# Patient Record
Sex: MALE | Race: WHITE | Employment: FULL TIME | URBAN - METROPOLITAN AREA
[De-identification: names, ages, dates, MRNs, and addresses within clinical notes are randomized per-mention and may not be internally consistent; named-entity substitution may affect disease eponyms.]

---

## 2017-02-15 ENCOUNTER — HOSPITAL ENCOUNTER (OUTPATIENT)
Dept: CT IMAGING | Age: 52
Discharge: HOME | End: 2017-02-15
Attending: FAMILY MEDICINE
Payer: COMMERCIAL

## 2017-02-15 ENCOUNTER — HOSPITAL ENCOUNTER (OUTPATIENT)
Dept: ULTRASOUND IMAGING | Age: 52
Discharge: HOME | End: 2017-02-15
Attending: FAMILY MEDICINE
Payer: COMMERCIAL

## 2017-02-15 DIAGNOSIS — R22.1 NECK MASS: ICD-10-CM

## 2017-02-15 DIAGNOSIS — R31.9 HEMATURIA SYNDROME: ICD-10-CM

## 2017-02-15 PROCEDURE — 74011636320 HC RX REV CODE- 636/320: Performed by: RADIOLOGY

## 2017-02-15 PROCEDURE — 74178 CT ABD&PLV WO CNTR FLWD CNTR: CPT

## 2017-02-15 PROCEDURE — 74011250636 HC RX REV CODE- 250/636: Performed by: RADIOLOGY

## 2017-02-15 PROCEDURE — 76536 US EXAM OF HEAD AND NECK: CPT

## 2017-02-15 RX ORDER — SODIUM CHLORIDE 0.9 % (FLUSH) 0.9 %
10 SYRINGE (ML) INJECTION
Status: COMPLETED | OUTPATIENT
Start: 2017-02-15 | End: 2017-02-15

## 2017-02-15 RX ORDER — SODIUM CHLORIDE 9 MG/ML
50 INJECTION, SOLUTION INTRAVENOUS
Status: COMPLETED | OUTPATIENT
Start: 2017-02-15 | End: 2017-02-15

## 2017-02-15 RX ADMIN — Medication 10 ML: at 07:37

## 2017-02-15 RX ADMIN — IOPAMIDOL 100 ML: 612 INJECTION, SOLUTION INTRAVENOUS at 07:37

## 2017-02-15 RX ADMIN — SODIUM CHLORIDE 50 ML/HR: 900 INJECTION, SOLUTION INTRAVENOUS at 07:37

## 2017-03-30 ENCOUNTER — OFFICE VISIT (OUTPATIENT)
Dept: CARDIOLOGY CLINIC | Age: 52
End: 2017-03-30

## 2017-03-30 VITALS
DIASTOLIC BLOOD PRESSURE: 90 MMHG | BODY MASS INDEX: 36.11 KG/M2 | WEIGHT: 243.8 LBS | SYSTOLIC BLOOD PRESSURE: 150 MMHG | HEIGHT: 69 IN | HEART RATE: 79 BPM | RESPIRATION RATE: 16 BRPM | OXYGEN SATURATION: 97 %

## 2017-03-30 DIAGNOSIS — I25.10 CORONARY ARTERY DISEASE INVOLVING NATIVE CORONARY ARTERY OF NATIVE HEART WITHOUT ANGINA PECTORIS: Primary | ICD-10-CM

## 2017-03-30 DIAGNOSIS — I10 ESSENTIAL HYPERTENSION: ICD-10-CM

## 2017-03-30 DIAGNOSIS — Z01.810 PREOP CARDIOVASCULAR EXAM: ICD-10-CM

## 2017-03-30 DIAGNOSIS — Z71.6 ENCOUNTER FOR COUNSELING FOR TOBACCO USE DISORDER: ICD-10-CM

## 2017-03-30 DIAGNOSIS — Z72.0 TOBACCO USE: ICD-10-CM

## 2017-03-30 NOTE — LETTER
3/31/2017 4:39 PM 
 
Patient:  Barbara Vilchis YOB: 1965 Date of Visit: 3/30/2017 Dear Farhan Antoine, MD 
[address] 
[address] [address] VIA Facsimile: [phone] Gissel Quinn III 80  [facility] [place] [address] VIA In Basket 
 : 
Barbara Vilchis is a 45 yo M with CAD s/p cath on 9/30/14 with 80% prox/mid LAD lesion treated with drug eluting stent after abnormal stress test, HTN, tobacco use, family history of early CAD. He is here for post cath f/u. He is here for preop cardiac evaluation prior surgery on his right tonsil followed by Dr. Yajaira Luna and oncology. From a cardiac standpoint, he denies any exertional chest pain or shortness of breath. No significant palpitations. He has been compliant with his medications. 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 He says they did a PET scan and there is no spread. He is scheduled to have lymph node surgery on 04/10/2017 at [facility] and then surgery of the tonsil at [facility] on 04/19/2017. With regard to tobacco use, he has cut this back in half over the last few weeks. Encouraged him for complete cessation. He is compensated on exam with clear lungs and no lower extremity edema. Soc hx. Working on tobacco cessation; down from 1 ppd to 1 pack every 3 days. . 4 kids. Still smoking Assessment and Plan: 1. Coronary artery disease, status post LAD stent in 2014. Stable and compensated. Continue aspirin, statin and ARB. 2. Preop cardiac evaluation. He is low risk for cardiac complications and optimized from a cardiac standpoint. He can hold his aspirin as needed, but stressed the importance of restarting after. 3. Essential hypertension. Blood pressure is controlled and no changes made. 4. Mixed hyperlipidemia. History of intolerance to Lipitor and Zocor with muscle aches, but has tolerated Pravachol. 5. Tobacco use. Stressed the importance of complete cessation. 6. Tonsil cancer. Followed by oncology, Dr. Sidney Wood and we will send preop clearance. 7. Hypothyroid. If you have questions, please do not hesitate to call me. Sincerely, Verito Johnson MD

## 2017-03-30 NOTE — MR AVS SNAPSHOT
Visit Information Date & Time Provider Department Dept. Phone Encounter #  
 3/30/2017 10:00 AM Radha Paul MD [facility] [place] [phone] [id number] Upcoming Health Maintenance Date Due Pneumococcal 19-64 Medium Risk (1 of 1 - PPSV23) 5/16/1984 DTaP/Tdap/Td series (1 - Tdap) 5/16/1986 FOBT Q 1 YEAR AGE 50-75 5/16/2015 INFLUENZA AGE 9 TO ADULT 8/1/2016 Allergies as of 3/30/2017  Review Complete On: 3/30/2017 By: Mark Gutierrez RN No Known Allergies Current Immunizations  Reviewed on 9/30/2014 No immunizations on file. Not reviewed this visit You Were Diagnosed With   
  
 Codes Comments Unstable angina (HCC)    -  Primary ICD-10-CM: I20.0 ICD-9-CM: 411.1 Essential hypertension     ICD-10-CM: I10 
ICD-9-CM: 401.9 Vitals BP Pulse Resp Height(growth percentile) Weight(growth percentile) SpO2  
 150/90 (BP 1 Location: Left arm, BP Patient Position: Sitting) 79 16 5' 9\" (1.753 m) 243 lb 12.8 oz (110.6 kg) 97% BMI Smoking Status 36 kg/m2 Current Every Day Smoker BMI and BSA Data Body Mass Index Body Surface Area  
 36 kg/m 2 2.32 m 2 Preferred Pharmacy Pharmacy Name Phone [facility] [address] [place] [address] Dr Tran, [address] [phone] Your Updated Medication List  
  
   
This list is accurate as of: 3/30/17 10:39 AM.  Always use your most recent med list.  
  
  
  
  
 aspirin delayed-release 81 mg tablet Take  by mouth daily. levothyroxine 100 mcg tablet Commonly known as:  SYNTHROID Take 1 tablet by mouth Daily (before breakfast). losartan 25 mg tablet Commonly known as:  COZAAR Take 1 Tab by mouth daily. pravastatin 10 mg tablet Commonly known as:  PRAVACHOL Take 1 Tab by mouth nightly. We Performed the Following AMB POC EKG ROUTINE W/ 12 LEADS, INTER & REP [85115 CPT(R)] Introducing [facility]! Dear Luis E Ayala: Thank you for requesting a [employer] account. Our records indicate that you already have an active [employer] account. You can access your account anytime at [email or web address] Did you know that you can access your hospital and ER discharge instructions at any time in [employer]? You can also review all of your test results from your hospital stay or ER visit. Additional Information If you have questions, please visit the Frequently Asked Questions section of the [employer] website at [email or web address] Remember, [employer] is NOT to be used for urgent needs. For medical emergencies, dial 911. Now available from your iPhone and Android! Please provide this summary of care documentation to your next provider. Your primary care clinician is listed as Tiana Suárez. If you have any questions after today's visit, please call [phone].

## 2017-03-30 NOTE — PROGRESS NOTES
[address]: Rachel Ayala  [phone]    HPI:  Wilbert Gleason is a 45 yo M with CAD s/p cath on 9/30/14 with 80% prox/mid LAD lesion treated with drug eluting stent after abnormal stress test, HTN, tobacco use, family history of early CAD. He is here for post cath f/u. He is here for preop cardiac evaluation prior surgery on his right tonsil followed by Dr. Johnna Luna and oncology. From a cardiac standpoint, he denies any exertional chest pain or shortness of breath. No significant palpitations. He has been compliant with his medications. 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 He says they did a PET scan and there is no spread. He is scheduled to have lymph node surgery on 04/10/2017 at [facility] and [facility]. With regard to tobacco use, he has cut this back in half over the last few weeks. Encouraged him for complete cessation. He is compensated on exam with clear lungs and no lower extremity edema. Soc hx. Working on tobacco cessation; down from 1 ppd to 1 pack every 3 days. . 4 kids. Still smoking  Assessment and Plan:   1. Coronary artery disease, status post LAD stent in 2014. Stable and compensated. Continue aspirin, statin and ARB. 2. Preop cardiac evaluation. He is low risk for cardiac complications and optimized from a cardiac standpoint. He can hold his aspirin as needed, but stressed the importance of restarting after. 3. Essential hypertension. Blood pressure is controlled and no changes made. 4. Mixed hyperlipidemia. History of intolerance to Lipitor and Zocor with muscle aches, but has tolerated Pravachol. 5. Tobacco use. Stressed the importance of complete cessation. 6. Tonsil cancer. Followed by oncology, Dr. Missael Devries and we will send preop clearance. 7. Hypothyroid. He  has a past medical history of Hypothyroidism. All other systems negative except as above. PE  Vitals:    03/30/17 1019   BP: 150/90   Pulse: 79   Resp: 16   SpO2: 97%   Weight: 243 lb 12.8 oz (110.6 kg)   Height: 5' 9\" (1.753 m)    Body mass index is 36 kg/(m^2).    General appearance - alert, well appearing, and in no distress  Mental status - affect appropriate to mood  Eyes - sclera anicteric, moist mucous membranes  Neck - supple, no significant adenopathy, no JVD  Chest - clear to auscultation, no wheezes, rales or rhonchi  Heart - normal rate, regular rhythm, normal S1, S2, no murmurs, rubs, clicks or gallops  Abdomen - soft, nontender, nondistended, no masses or organomegaly  Neurological - no focal deficit  Extremities - peripheral pulses normal, no pedal edema      Recent Labs:  Lab Results   Component Value Date/Time    Cholesterol, total 205 09/30/2014 03:48 AM    HDL Cholesterol 31 09/30/2014 03:48 AM    LDL,Direct 148 09/30/2014 03:48 AM    LDL, calculated Not calculated due to elevated triglyceride level 09/30/2014 03:48 AM    Triglyceride 406 09/30/2014 03:48 AM    CHOL/HDL Ratio 6.6 09/30/2014 03:48 AM     Lab Results   Component Value Date/Time    Creatinine 0.82 10/01/2014 03:44 AM     Lab Results   Component Value Date/Time    BUN 16 10/01/2014 03:44 AM     Lab Results   Component Value Date/Time    Potassium 3.8 10/01/2014 03:44 AM     No results found for: HBA1C, ZBY3HNMP  Lab Results   Component Value Date/Time    HGB 13.1 10/01/2014 03:44 AM     Lab Results   Component Value Date/Time    PLATELET 442 40/38/1137 03:44 AM       Reviewed:  Past Medical History:   Diagnosis Date    Hypothyroidism      History   Smoking Status    Current Every Day Smoker    Packs/day: 0.50   Smokeless Tobacco    Former User     History   Alcohol Use    Yes     Comment: 1 drink per week     No Known Allergies    Current Outpatient Prescriptions   Medication Sig    aspirin delayed-release 81 mg tablet Take  by mouth daily.  losartan (COZAAR) 25 mg tablet Take 1 Tab by mouth daily.  pravastatin (PRAVACHOL) 10 mg tablet Take 1 Tab by mouth nightly.  levothyroxine (SYNTHROID) 100 mcg tablet Take 1 tablet by mouth Daily (before breakfast). No current facility-administered medications for this visit.         MD Demetrius Oseguera [facility] [place]  [facility] [address], [address] [facility] 83[address]  [address]

## 2017-04-06 NOTE — PERIOP NOTES
Pre-operative instructions reviewed and patient verbalizes understanding of instructions. Patient has been given the opportunity to ask additional questions.

## 2017-04-10 ENCOUNTER — ANESTHESIA EVENT (OUTPATIENT)
Dept: SURGERY | Age: 52
DRG: 130 | End: 2017-04-10
Payer: COMMERCIAL

## 2017-04-10 ENCOUNTER — ANESTHESIA (OUTPATIENT)
Dept: SURGERY | Age: 52
DRG: 130 | End: 2017-04-10
Payer: COMMERCIAL

## 2017-04-10 ENCOUNTER — HOSPITAL ENCOUNTER (INPATIENT)
Age: 52
LOS: 1 days | Discharge: HOME | DRG: 130 | End: 2017-04-11
Attending: OTOLARYNGOLOGY | Admitting: OTOLARYNGOLOGY
Payer: COMMERCIAL

## 2017-04-10 PROBLEM — C09.9 MALIGNANT NEOPLASM OF TONSIL (HCC): Status: ACTIVE | Noted: 2017-04-10

## 2017-04-10 PROCEDURE — 74011250636 HC RX REV CODE- 250/636: Performed by: ANESTHESIOLOGY

## 2017-04-10 PROCEDURE — 77030002933 HC SUT MCRYL J&J -A: Performed by: OTOLARYNGOLOGY

## 2017-04-10 PROCEDURE — 77030018836 HC SOL IRR NACL ICUM -A: Performed by: OTOLARYNGOLOGY

## 2017-04-10 PROCEDURE — 74011000250 HC RX REV CODE- 250

## 2017-04-10 PROCEDURE — 88307 TISSUE EXAM BY PATHOLOGIST: CPT | Performed by: OTOLARYNGOLOGY

## 2017-04-10 PROCEDURE — 77030032060 HC PWDR HEMSTAT ARISTA ASRB 3GM BARD -C: Performed by: OTOLARYNGOLOGY

## 2017-04-10 PROCEDURE — 74011250637 HC RX REV CODE- 250/637: Performed by: OTOLARYNGOLOGY

## 2017-04-10 PROCEDURE — 74011250636 HC RX REV CODE- 250/636

## 2017-04-10 PROCEDURE — 74011250636 HC RX REV CODE- 250/636: Performed by: OTOLARYNGOLOGY

## 2017-04-10 PROCEDURE — 76060000036 HC ANESTHESIA 2.5 TO 3 HR: Performed by: OTOLARYNGOLOGY

## 2017-04-10 PROCEDURE — 77030019908 HC STETH ESOPH SIMS -A: Performed by: ANESTHESIOLOGY

## 2017-04-10 PROCEDURE — 77030011640 HC PAD GRND REM COVD -A: Performed by: OTOLARYNGOLOGY

## 2017-04-10 PROCEDURE — 07T10ZZ RESECTION OF RIGHT NECK LYMPHATIC, OPEN APPROACH: ICD-10-PCS | Performed by: OTOLARYNGOLOGY

## 2017-04-10 PROCEDURE — 77030032490 HC SLV COMPR SCD KNE COVD -B: Performed by: OTOLARYNGOLOGY

## 2017-04-10 PROCEDURE — 76210000000 HC OR PH I REC 2 TO 2.5 HR: Performed by: OTOLARYNGOLOGY

## 2017-04-10 PROCEDURE — 74011000250 HC RX REV CODE- 250: Performed by: OTOLARYNGOLOGY

## 2017-04-10 PROCEDURE — 77030026438 HC STYL ET INTUB CARD -A: Performed by: ANESTHESIOLOGY

## 2017-04-10 PROCEDURE — 65270000029 HC RM PRIVATE

## 2017-04-10 PROCEDURE — 76010000172 HC OR TIME 2.5 TO 3 HR INTENSV-TIER 1: Performed by: OTOLARYNGOLOGY

## 2017-04-10 PROCEDURE — 77030008467 HC STPLR SKN COVD -B: Performed by: OTOLARYNGOLOGY

## 2017-04-10 PROCEDURE — 77030012406 HC DRN WND PENRS BARD -A: Performed by: OTOLARYNGOLOGY

## 2017-04-10 PROCEDURE — 77030013079 HC BLNKT BAIR HGGR 3M -A: Performed by: ANESTHESIOLOGY

## 2017-04-10 PROCEDURE — 77030002996 HC SUT SLK J&J -A: Performed by: OTOLARYNGOLOGY

## 2017-04-10 PROCEDURE — 77030002916 HC SUT ETHLN J&J -A: Performed by: OTOLARYNGOLOGY

## 2017-04-10 PROCEDURE — 77030008684 HC TU ET CUF COVD -B: Performed by: ANESTHESIOLOGY

## 2017-04-10 PROCEDURE — 77030003029 HC SUT VCRL J&J -B: Performed by: OTOLARYNGOLOGY

## 2017-04-10 PROCEDURE — 77030010514 HC APPL CLP LIG COVD -B: Performed by: OTOLARYNGOLOGY

## 2017-04-10 PROCEDURE — 77030031139 HC SUT VCRL2 J&J -A: Performed by: OTOLARYNGOLOGY

## 2017-04-10 RX ORDER — ONDANSETRON 4 MG/1
4 TABLET, ORALLY DISINTEGRATING ORAL
Qty: 8 TAB | Refills: 1 | Status: SHIPPED | OUTPATIENT
Start: 2017-04-10 | End: 2018-12-04

## 2017-04-10 RX ORDER — ONDANSETRON 2 MG/ML
4 INJECTION INTRAMUSCULAR; INTRAVENOUS AS NEEDED
Status: DISCONTINUED | OUTPATIENT
Start: 2017-04-10 | End: 2017-04-10 | Stop reason: HOSPADM

## 2017-04-10 RX ORDER — SODIUM CHLORIDE, SODIUM LACTATE, POTASSIUM CHLORIDE, CALCIUM CHLORIDE 600; 310; 30; 20 MG/100ML; MG/100ML; MG/100ML; MG/100ML
125 INJECTION, SOLUTION INTRAVENOUS CONTINUOUS
Status: DISCONTINUED | OUTPATIENT
Start: 2017-04-10 | End: 2017-04-10 | Stop reason: HOSPADM

## 2017-04-10 RX ORDER — BACITRACIN ZINC 500 UNIT/G
OINTMENT (GRAM) TOPICAL ONCE
Status: CANCELLED | OUTPATIENT
Start: 2017-04-10 | End: 2017-04-10

## 2017-04-10 RX ORDER — LABETALOL HYDROCHLORIDE 5 MG/ML
INJECTION, SOLUTION INTRAVENOUS AS NEEDED
Status: DISCONTINUED | OUTPATIENT
Start: 2017-04-10 | End: 2017-04-10 | Stop reason: HOSPADM

## 2017-04-10 RX ORDER — FENTANYL CITRATE 50 UG/ML
INJECTION, SOLUTION INTRAMUSCULAR; INTRAVENOUS AS NEEDED
Status: DISCONTINUED | OUTPATIENT
Start: 2017-04-10 | End: 2017-04-10 | Stop reason: HOSPADM

## 2017-04-10 RX ORDER — SODIUM CHLORIDE 0.9 % (FLUSH) 0.9 %
5-10 SYRINGE (ML) INJECTION AS NEEDED
Status: DISCONTINUED | OUTPATIENT
Start: 2017-04-10 | End: 2017-04-10 | Stop reason: HOSPADM

## 2017-04-10 RX ORDER — DEXAMETHASONE SODIUM PHOSPHATE 4 MG/ML
INJECTION, SOLUTION INTRA-ARTICULAR; INTRALESIONAL; INTRAMUSCULAR; INTRAVENOUS; SOFT TISSUE AS NEEDED
Status: DISCONTINUED | OUTPATIENT
Start: 2017-04-10 | End: 2017-04-10 | Stop reason: HOSPADM

## 2017-04-10 RX ORDER — DIPHENHYDRAMINE HYDROCHLORIDE 50 MG/ML
12.5 INJECTION, SOLUTION INTRAMUSCULAR; INTRAVENOUS AS NEEDED
Status: DISCONTINUED | OUTPATIENT
Start: 2017-04-10 | End: 2017-04-10 | Stop reason: HOSPADM

## 2017-04-10 RX ORDER — MIDAZOLAM HYDROCHLORIDE 1 MG/ML
0.5 INJECTION, SOLUTION INTRAMUSCULAR; INTRAVENOUS
Status: DISCONTINUED | OUTPATIENT
Start: 2017-04-10 | End: 2017-04-10 | Stop reason: HOSPADM

## 2017-04-10 RX ORDER — ONDANSETRON 2 MG/ML
INJECTION INTRAMUSCULAR; INTRAVENOUS AS NEEDED
Status: DISCONTINUED | OUTPATIENT
Start: 2017-04-10 | End: 2017-04-10 | Stop reason: HOSPADM

## 2017-04-10 RX ORDER — ONDANSETRON 2 MG/ML
4 INJECTION INTRAMUSCULAR; INTRAVENOUS
Status: DISCONTINUED | OUTPATIENT
Start: 2017-04-10 | End: 2017-04-11 | Stop reason: HOSPADM

## 2017-04-10 RX ORDER — FENTANYL CITRATE 50 UG/ML
50 INJECTION, SOLUTION INTRAMUSCULAR; INTRAVENOUS AS NEEDED
Status: DISCONTINUED | OUTPATIENT
Start: 2017-04-10 | End: 2017-04-10 | Stop reason: HOSPADM

## 2017-04-10 RX ORDER — HYDROMORPHONE HYDROCHLORIDE 2 MG/ML
INJECTION, SOLUTION INTRAMUSCULAR; INTRAVENOUS; SUBCUTANEOUS AS NEEDED
Status: DISCONTINUED | OUTPATIENT
Start: 2017-04-10 | End: 2017-04-10 | Stop reason: HOSPADM

## 2017-04-10 RX ORDER — CEFAZOLIN SODIUM IN 0.9 % NACL 2 G/100 ML
PLASTIC BAG, INJECTION (ML) INTRAVENOUS AS NEEDED
Status: DISCONTINUED | OUTPATIENT
Start: 2017-04-10 | End: 2017-04-10 | Stop reason: HOSPADM

## 2017-04-10 RX ORDER — MORPHINE SULFATE 10 MG/ML
2 INJECTION, SOLUTION INTRAMUSCULAR; INTRAVENOUS
Status: DISCONTINUED | OUTPATIENT
Start: 2017-04-10 | End: 2017-04-10 | Stop reason: HOSPADM

## 2017-04-10 RX ORDER — OXYCODONE AND ACETAMINOPHEN 5; 325 MG/1; MG/1
1 TABLET ORAL AS NEEDED
Status: DISCONTINUED | OUTPATIENT
Start: 2017-04-10 | End: 2017-04-10 | Stop reason: HOSPADM

## 2017-04-10 RX ORDER — FENTANYL CITRATE 50 UG/ML
25 INJECTION, SOLUTION INTRAMUSCULAR; INTRAVENOUS
Status: DISCONTINUED | OUTPATIENT
Start: 2017-04-10 | End: 2017-04-10 | Stop reason: HOSPADM

## 2017-04-10 RX ORDER — LIDOCAINE HYDROCHLORIDE 20 MG/ML
INJECTION, SOLUTION EPIDURAL; INFILTRATION; INTRACAUDAL; PERINEURAL AS NEEDED
Status: DISCONTINUED | OUTPATIENT
Start: 2017-04-10 | End: 2017-04-10 | Stop reason: HOSPADM

## 2017-04-10 RX ORDER — OXYCODONE AND ACETAMINOPHEN 7.5; 325 MG/1; MG/1
1 TABLET ORAL
Status: DISCONTINUED | OUTPATIENT
Start: 2017-04-10 | End: 2017-04-11 | Stop reason: HOSPADM

## 2017-04-10 RX ORDER — LIDOCAINE HYDROCHLORIDE AND EPINEPHRINE 10; 10 MG/ML; UG/ML
INJECTION, SOLUTION INFILTRATION; PERINEURAL AS NEEDED
Status: DISCONTINUED | OUTPATIENT
Start: 2017-04-10 | End: 2017-04-10 | Stop reason: HOSPADM

## 2017-04-10 RX ORDER — PHENYLEPHRINE HCL IN 0.9% NACL 0.4MG/10ML
SYRINGE (ML) INTRAVENOUS AS NEEDED
Status: DISCONTINUED | OUTPATIENT
Start: 2017-04-10 | End: 2017-04-10 | Stop reason: HOSPADM

## 2017-04-10 RX ORDER — MIDAZOLAM HYDROCHLORIDE 1 MG/ML
1 INJECTION, SOLUTION INTRAMUSCULAR; INTRAVENOUS AS NEEDED
Status: DISCONTINUED | OUTPATIENT
Start: 2017-04-10 | End: 2017-04-10 | Stop reason: HOSPADM

## 2017-04-10 RX ORDER — PROPOFOL 10 MG/ML
INJECTION, EMULSION INTRAVENOUS AS NEEDED
Status: DISCONTINUED | OUTPATIENT
Start: 2017-04-10 | End: 2017-04-10 | Stop reason: HOSPADM

## 2017-04-10 RX ORDER — OXYCODONE AND ACETAMINOPHEN 7.5; 325 MG/1; MG/1
1 TABLET ORAL
Qty: 50 TAB | Refills: 0 | Status: SHIPPED | OUTPATIENT
Start: 2017-04-10 | End: 2018-12-04

## 2017-04-10 RX ORDER — SODIUM CHLORIDE 0.9 % (FLUSH) 0.9 %
5-10 SYRINGE (ML) INJECTION EVERY 8 HOURS
Status: DISCONTINUED | OUTPATIENT
Start: 2017-04-10 | End: 2017-04-11 | Stop reason: HOSPADM

## 2017-04-10 RX ORDER — SODIUM CHLORIDE, SODIUM LACTATE, POTASSIUM CHLORIDE, CALCIUM CHLORIDE 600; 310; 30; 20 MG/100ML; MG/100ML; MG/100ML; MG/100ML
INJECTION, SOLUTION INTRAVENOUS
Status: DISCONTINUED | OUTPATIENT
Start: 2017-04-10 | End: 2017-04-10 | Stop reason: HOSPADM

## 2017-04-10 RX ORDER — LIDOCAINE HYDROCHLORIDE 10 MG/ML
0.1 INJECTION, SOLUTION EPIDURAL; INFILTRATION; INTRACAUDAL; PERINEURAL AS NEEDED
Status: DISCONTINUED | OUTPATIENT
Start: 2017-04-10 | End: 2017-04-10 | Stop reason: HOSPADM

## 2017-04-10 RX ORDER — SODIUM CHLORIDE 0.9 % (FLUSH) 0.9 %
5-10 SYRINGE (ML) INJECTION AS NEEDED
Status: DISCONTINUED | OUTPATIENT
Start: 2017-04-10 | End: 2017-04-11 | Stop reason: HOSPADM

## 2017-04-10 RX ORDER — HYDROMORPHONE HYDROCHLORIDE 1 MG/ML
0.2 INJECTION, SOLUTION INTRAMUSCULAR; INTRAVENOUS; SUBCUTANEOUS
Status: DISCONTINUED | OUTPATIENT
Start: 2017-04-10 | End: 2017-04-10 | Stop reason: HOSPADM

## 2017-04-10 RX ORDER — ROCURONIUM BROMIDE 10 MG/ML
INJECTION, SOLUTION INTRAVENOUS AS NEEDED
Status: DISCONTINUED | OUTPATIENT
Start: 2017-04-10 | End: 2017-04-10 | Stop reason: HOSPADM

## 2017-04-10 RX ORDER — SODIUM CHLORIDE 9 MG/ML
25 INJECTION, SOLUTION INTRAVENOUS CONTINUOUS
Status: DISCONTINUED | OUTPATIENT
Start: 2017-04-10 | End: 2017-04-10 | Stop reason: HOSPADM

## 2017-04-10 RX ORDER — MORPHINE SULFATE 2 MG/ML
2 INJECTION, SOLUTION INTRAMUSCULAR; INTRAVENOUS
Status: DISCONTINUED | OUTPATIENT
Start: 2017-04-10 | End: 2017-04-11 | Stop reason: HOSPADM

## 2017-04-10 RX ORDER — MIDAZOLAM HYDROCHLORIDE 1 MG/ML
INJECTION, SOLUTION INTRAMUSCULAR; INTRAVENOUS AS NEEDED
Status: DISCONTINUED | OUTPATIENT
Start: 2017-04-10 | End: 2017-04-10 | Stop reason: HOSPADM

## 2017-04-10 RX ORDER — SODIUM CHLORIDE 0.9 % (FLUSH) 0.9 %
5-10 SYRINGE (ML) INJECTION EVERY 8 HOURS
Status: DISCONTINUED | OUTPATIENT
Start: 2017-04-10 | End: 2017-04-10 | Stop reason: HOSPADM

## 2017-04-10 RX ORDER — LEVOTHYROXINE SODIUM 100 UG/1
100 TABLET ORAL
Status: DISCONTINUED | OUTPATIENT
Start: 2017-04-11 | End: 2017-04-11 | Stop reason: HOSPADM

## 2017-04-10 RX ADMIN — ONDANSETRON 4 MG: 2 INJECTION INTRAMUSCULAR; INTRAVENOUS at 11:11

## 2017-04-10 RX ADMIN — FENTANYL CITRATE 150 MCG: 50 INJECTION, SOLUTION INTRAMUSCULAR; INTRAVENOUS at 08:52

## 2017-04-10 RX ADMIN — Medication 160 MCG: at 09:05

## 2017-04-10 RX ADMIN — ONDANSETRON 4 MG: 2 INJECTION INTRAMUSCULAR; INTRAVENOUS at 13:28

## 2017-04-10 RX ADMIN — ROCURONIUM BROMIDE 10 MG: 10 INJECTION, SOLUTION INTRAVENOUS at 08:52

## 2017-04-10 RX ADMIN — MIDAZOLAM HYDROCHLORIDE 2 MG: 1 INJECTION, SOLUTION INTRAMUSCULAR; INTRAVENOUS at 08:49

## 2017-04-10 RX ADMIN — Medication 80 MCG: at 09:24

## 2017-04-10 RX ADMIN — FENTANYL CITRATE 50 MCG: 50 INJECTION, SOLUTION INTRAMUSCULAR; INTRAVENOUS at 09:22

## 2017-04-10 RX ADMIN — Medication 80 MCG: at 09:34

## 2017-04-10 RX ADMIN — PROPOFOL 200 MG: 10 INJECTION, EMULSION INTRAVENOUS at 08:52

## 2017-04-10 RX ADMIN — LABETALOL HYDROCHLORIDE 10 MG: 5 INJECTION, SOLUTION INTRAVENOUS at 09:54

## 2017-04-10 RX ADMIN — LABETALOL HYDROCHLORIDE 5 MG: 5 INJECTION, SOLUTION INTRAVENOUS at 11:28

## 2017-04-10 RX ADMIN — Medication 10 ML: at 15:00

## 2017-04-10 RX ADMIN — OXYCODONE HYDROCHLORIDE AND ACETAMINOPHEN 1 TABLET: 7.5; 325 TABLET ORAL at 19:07

## 2017-04-10 RX ADMIN — HYDROMORPHONE HYDROCHLORIDE 2 MG: 2 INJECTION, SOLUTION INTRAMUSCULAR; INTRAVENOUS; SUBCUTANEOUS at 09:40

## 2017-04-10 RX ADMIN — FENTANYL CITRATE 100 MCG: 50 INJECTION, SOLUTION INTRAMUSCULAR; INTRAVENOUS at 09:53

## 2017-04-10 RX ADMIN — ONDANSETRON 4 MG: 2 INJECTION INTRAMUSCULAR; INTRAVENOUS at 16:24

## 2017-04-10 RX ADMIN — DEXAMETHASONE SODIUM PHOSPHATE 4 MG: 4 INJECTION, SOLUTION INTRA-ARTICULAR; INTRALESIONAL; INTRAMUSCULAR; INTRAVENOUS; SOFT TISSUE at 11:11

## 2017-04-10 RX ADMIN — LABETALOL HYDROCHLORIDE 5 MG: 5 INJECTION, SOLUTION INTRAVENOUS at 11:20

## 2017-04-10 RX ADMIN — SODIUM CHLORIDE, SODIUM LACTATE, POTASSIUM CHLORIDE, CALCIUM CHLORIDE: 600; 310; 30; 20 INJECTION, SOLUTION INTRAVENOUS at 08:49

## 2017-04-10 RX ADMIN — Medication 2 G: at 09:00

## 2017-04-10 RX ADMIN — Medication 80 MCG: at 09:31

## 2017-04-10 RX ADMIN — Medication 10 ML: at 21:37

## 2017-04-10 RX ADMIN — FENTANYL CITRATE 50 MCG: 50 INJECTION, SOLUTION INTRAMUSCULAR; INTRAVENOUS at 09:17

## 2017-04-10 RX ADMIN — LIDOCAINE HYDROCHLORIDE 80 MG: 20 INJECTION, SOLUTION EPIDURAL; INFILTRATION; INTRACAUDAL; PERINEURAL at 08:52

## 2017-04-10 RX ADMIN — Medication 120 MCG: at 10:15

## 2017-04-10 RX ADMIN — LABETALOL HYDROCHLORIDE 5 MG: 5 INJECTION, SOLUTION INTRAVENOUS at 10:54

## 2017-04-10 RX ADMIN — PROPOFOL 50 MG: 10 INJECTION, EMULSION INTRAVENOUS at 09:22

## 2017-04-10 RX ADMIN — SODIUM CHLORIDE, SODIUM LACTATE, POTASSIUM CHLORIDE, CALCIUM CHLORIDE: 600; 310; 30; 20 INJECTION, SOLUTION INTRAVENOUS at 10:45

## 2017-04-10 NOTE — BRIEF OP NOTE
BRIEF OPERATIVE NOTE    Date of Procedure: 4/10/2017   Preoperative Diagnosis: MALIGNANT NEOPLASM OF TONSIL  Postoperative Diagnosis: MALIGNANT NEOPLASM OF TONSIL    Procedure(s):  RIGHT MODIFIED NECK DISSECTION  Surgeon(s) and Role:     Davin Luna IV, MD - Primary            Surgical Staff:  Circ-1: Jun Mathias, GERARDO  Circ-Relief: Alea Rowan RN; Arnold Richey RN  Registered Nurse Assistant: Atul Yeboah RN  Scrub RN-1: Ely Muhammad RN  Event Time In   Incision Start 6641   Incision Close 1119     Anesthesia: General   Estimated Blood Loss: 50ml  Specimens:   ID Type Source Tests Collected by Time Destination   1 : right neck disection level 1 [place]  Scarlett Luna IV, MD 4/10/2017 1050 Pathology   2 : right neck disection level 2 [place]  Scarlett Luna IV, MD 4/10/2017 1051 Pathology   3 : right neck disection level 3 [place]ck  Scarlett Luna IV, MD 4/10/2017 1052 Pathology   4 : right neck disection level 4 [place]ck  Scarlett Luna IV, MD 4/10/2017 1053 Pathology      Findings: XII and jugular vein preserved  Complications: none  Implants: * No implants in log *

## 2017-04-10 NOTE — ANESTHESIA POSTPROCEDURE EVALUATION
Post-Anesthesia Evaluation and Assessment    Patient: Hiral Medina MRN: [id number]  SSN: [id number]    YOB: 1965  Age: 46 y.o. Sex: male       Cardiovascular Function/Vital Signs  Visit Vitals    /67    Pulse 83    Temp 36.6 °C (97.8 °F)    Resp 20    Ht 5' 11\" (1.803 m)    Wt 109.8 kg (242 lb)    SpO2 98%    BMI 33.75 kg/m2       Patient is status post general anesthesia for Procedure(s):  RIGHT MODIFIED NECK DISSECTION. Nausea/Vomiting: None    Postoperative hydration reviewed and adequate. Pain:  Pain Scale 1: Numeric (0 - 10) (04/10/17 1237)  Pain Intensity 1: 0 (04/10/17 1237)   Managed    Neurological Status:   Neuro (WDL): Within Defined Limits (04/10/17 1237)  Neuro  LUE Motor Response: Purposeful (04/10/17 1237)  LLE Motor Response: Purposeful (04/10/17 1237)  RUE Motor Response: Purposeful (04/10/17 1237)  RLE Motor Response: Purposeful (04/10/17 1237)   At baseline    Mental Status and Level of Consciousness: Arousable    Pulmonary Status:   O2 Device: Nasal cannula (04/10/17 1237)   Adequate oxygenation and airway patent    Complications related to anesthesia: None    Post-anesthesia assessment completed.  No concerns    Signed By: Kasie Evans MD     April 10, 2017

## 2017-04-10 NOTE — ROUTINE PROCESS
Patient: Ernestine Thornton MRN: [id number]  SSN: [id number]   YOB: 1965  Age: 46 y.o. Sex: male     Patient is status post Procedure(s):  RIGHT MODIFIED NECK DISSECTION.     Surgeon(s) and Role:     Shilpa Luna IV, MD - Primary    ! % lidocaine w epine 7.5ml right neck                Peripheral IV 04/10/17 Left Hand (Active)   Site Assessment Clean, dry, & intact 4/10/2017  7:00 AM      Arterial Line 04/10/17 Right Radial artery (Active)        Suzan Brock #1 04/10/17 Right Neck (Active)                     Dressing/Packing:     * bacitracin ointment

## 2017-04-10 NOTE — PERIOP NOTES
TRANSFER - OUT REPORT:    Verbal report given to Kandice Ardon RN(name) on [employer]  being transferred to [facility](unit) for routine post - op       Report consisted of patients Situation, Background, Assessment and   Recommendations(SBAR). Time Pre op antibiotic given:0900  Anesthesia Stop time: 1429    Information from the following report(s) SBAR, OR Summary, Intake/Output, MAR, Recent Results and Med Rec Status was reviewed with the receiving nurse. Opportunity for questions and clarification was provided. Is the patient on 02? YES       L/Min 2       Other     Is the patient on a monitor? NO    Is the nurse transporting with the patient? NO    Surgical Waiting Area notified of patient's transfer from PACU?  YES      The following personal items collected during your admission accompanied patient upon transfer:   Dental Appliance: Dental Appliances: Uppers  Vision: Visual Aid: Glasses  Hearing Aid:    Jewelry:    Clothing: Clothing:  (bag of clothes returned in PACU)  Other Valuables:    Valuables sent to safe:

## 2017-04-10 NOTE — ANESTHESIA PREPROCEDURE EVALUATION
Anesthetic History   No history of anesthetic complications            Review of Systems / Medical History  Patient summary reviewed, nursing notes reviewed and pertinent labs reviewed    Pulmonary          Smoker         Neuro/Psych   Within defined limits           Cardiovascular    Hypertension: well controlled          CAD and cardiac stents    Exercise tolerance: >4 METS  Comments: No CP/SOB   GI/Hepatic/Renal     GERD: well controlled           Endo/Other      Hypothyroidism: well controlled       Other Findings   Comments: 1. Coronary artery disease, status post LAD stent in 2014. Stable and compensated. Continue aspirin, statin and ARB. 2. Preop cardiac evaluation. He is low risk for cardiac complications and optimized from a cardiac standpoint. He can hold his aspirin as needed, but stressed the importance of restarting after. 3. Essential hypertension. Blood pressure is controlled and no changes made. 4. Mixed hyperlipidemia. History of intolerance to Lipitor and Zocor with muscle aches, but has tolerated Pravachol. 5. Tobacco use. Stressed the importance of complete cessation. 6. Tonsil cancer. Followed by oncology, Dr. Michael Hickey and we will send preop clearance.    7. Hypothyroid         Physical Exam    Airway  Mallampati: II  TM Distance: > 6 cm  Neck ROM: normal range of motion   Mouth opening: Normal     Cardiovascular  Regular rate and rhythm,  S1 and S2 normal,  no murmur, click, rub, or gallop             Dental    Dentition: Full upper dentures     Pulmonary  Breath sounds clear to auscultation               Abdominal  GI exam deferred       Other Findings            Anesthetic Plan    ASA: 3  Anesthesia type: general    Monitoring Plan: Arterial line      Induction: Intravenous  Anesthetic plan and risks discussed with: Patient

## 2017-04-10 NOTE — OP NOTES
[address] [facility] [address]   OP NOTE       Name:  Fausto Woods   MR#:  [id number]   :  1965   Account #:  [de-identified]    Surgery Date:  04/10/2017   Date of Adm:  04/10/2017       PREOPERATIVE DIAGNOSIS: Squamous cell carcinoma of the right   tonsil. POSTOPERATIVE DIAGNOSIS: Squamous cell carcinoma of the right   tonsil. PROCEDURES PERFORMED: Selective neck dissection levels 1-4. SURGEON: Burgess Adams. Cheryl NASH MD    ANESTHESIA: General endotracheal anesthesia. ESTIMATED BLOOD LOSS: 50 mL. COMPLICATIONS: None. SPECIMENS REMOVED: right neck dissection    INDICATIONS:  This is a 45-year-old gentleman with a recently   diagnosed tonsil cancer, that initially presented with a right-sided neck   mass. Disease is confined to the right tonsil and right neck. Treatment   options were discussed at length and he ultimately opted for surgical   management, with possible adjuvant treatment as needed. The risks of   surgery were discussed to include bleeding, infection, pain, skin and   ear numbness, shoulder weakness, other nerve injury and need for   further surgery. OPERATIVE DETAIL: The patient was brought to the operating room   and general endotracheal anesthesia was induced. The neck was   prepped and draped in the usual sterile fashion. A curvilinear incision   was then designed in an existing skin crease in the neck,   approximately long-term between the mandible and clavicle, and   extended back towards the mastoid tip. This was infiltrated with 1%   lidocaine with epinephrine 1:100,000. The incision was then made with   a knife and taken down through the platysma. Subplatysmal skin flaps were   raised. The fascia over the submandibular gland was incised inferiorly   and dissected up over the glad. The marginal nerve was identified in   the process and spared. Dissection was continued over the gland.  The   facial artery and vein were ligated at the level of the mandible. The   fibrofatty tissue in level 1A was dissected off the digastrics and   reflected posteriorly. The mylohyoid was then retracted out of the way. This allowed the lingual ganglion and hypoglossal duct to be ligated. The lingual and hypoglossal nerves were spared. The facial artery was   then ligated again at the level of the posterior belly of the digastric, allowing the   level 1 contents to be reflected posteriorly. The palpable mass was in   high level 2. The fascia was then stripped off the sternocleidomastoid   muscle down towards the floor of the neck. The mass dissected free of   the muscle easily. The spinal accessory nerve was identified just   superior to the mass. It was able to be dissected free with no difficulty. The fibrofatty tissue in level 2B was then freed and retracted anteriorly   under the nerve. A 15 blade was then used to start raising the neck   tissue contents off of the floor of the neck, retracting them anteriorly. Spinal rootlets were spared as they were encountered. Inferiorly,   dissection was continued to the transverse cervical vessels at the level   of the clavicle. The fibrofatty tissue just lateral to the internal jugular   vein was ligated with clips at this level to prevent a chyle leak. A 15   blade was then used to free the neck dissection contents off of the   jugular vein and then carotid artery. The specimen was removed,    into levels 1, 2, 3 and 4, and sent for permanent section. Attention was then turned towards the external carotid. The facial   artery, as stated above, had been ligated at the level of the digastric. The branches were dissected free revealing that the ascending pharyngeal arose from   a common trunk with the facial artery. This was clipped to take off the   external carotid to devascularize the tumor. The neck was irrigated and   evacuated. Bipolar was used for hemostasis.  A 10 flat drain was brought   out through a separate stab incision in the neck and secured. Preethi   was sprayed in the wound to assist with hemostasis. The wound was   then closed using 3-0 Vicryl to close the platysma and staples on the   skin. At this time, the procedure was terminated. The patient was   awakened, extubated and taken to the PACU in stable condition.         Flavio Zepeda MD      [facility]   D:  04/10/2017   11:34   T:  04/10/2017   12:41   Job #:  [id number]

## 2017-04-10 NOTE — ADDENDUM NOTE
Addendum  created 04/10/17 1329 by Margot Casey CRNA    Anesthesia Intra Flowsheets edited, Procedure Event Log accessed

## 2017-04-10 NOTE — PERIOP NOTES
Pt stating he was having shortness of breath, states he \"nods off and breathing wakes me up\". Dr. Carissa You at bedside for assessment. Lungs clear, 100% on 2L, pt ok to go to floor.

## 2017-04-10 NOTE — ANESTHESIA PROCEDURE NOTES
Arterial Line Placement    Start time: 4/10/2017 8:08 AM  End time: 4/10/2017 8:18 AM  Performed by: Fabrice Kingsley by: Denise Cardenas     Pre-Procedure  Indications:  Arterial pressure monitoring  Preanesthetic Checklist: patient identified, risks and benefits discussed, anesthesia consent, site marked, patient being monitored, timeout performed and patient being monitored    Timeout Time: 08:09        Procedure:   Prep:  Chlorhexidine and alcohol  Seldinger Technique?: Yes    Orientation:  Right  Location:  Radial artery  Catheter size:  20 G  Number of attempts:  1  Cont Cardiac Output Sensor: No      Assessment:   Post-procedure:  Line secured and sterile dressing applied  Patient Tolerance:  Patient tolerated the procedure well with no immediate complications

## 2017-04-10 NOTE — IP AVS SNAPSHOT
[address] 
[phone] Patient: Berto Armenta MRN: [id number] :1965 You are allergic to the following No active allergies Recent Documentation Height Weight BMI Smoking Status 1.803 m 109.8 kg 33.75 kg/m2 Current Every Day Smoker Emergency Contacts Name Discharge Info Relation Home Work Mobile Nilesh Ramírez  Spouse [3] [phone] Lino Briscoe  Spouse [3] [phone] About your hospitalization You were admitted on:  April 10, 2017 You last received care in the:  [place] [address] You were discharged on:  2017 Unit phone number:  [phone] Why you were hospitalized Your primary diagnosis was:  Not on File Your diagnoses also included: Malignant Neoplasm Of Tonsil (Hcc) Providers Seen During Your Hospitalizations Provider Role Specialty Primary office phone Rigo Louise MD Attending Provider Otolaryngology [phone] Your Primary Care Physician (PCP) Primary Care Physician Office Phone Office Fax Sally Vincent, [address] [phone] Follow-up Information Follow up With Details Comments Contact Info Wes Osman MD   [address] [place] [address] [address] [phone] Current Discharge Medication List  
  
START taking these medications Dose & Instructions Dispensing Information Comments Morning Noon Evening Bedtime  
 ondansetron 4 mg disintegrating tablet Commonly known as:  ZOFRAN ODT Your last dose was: Your next dose is:    
   
   
 Dose:  4 mg Take 1 Tab by mouth every eight (8) hours as needed for Nausea. Quantity:  8 Tab Refills:  1  
     
   
   
   
  
 oxyCODONE-acetaminophen 7.5-325 mg per tablet Commonly known as:  PERCOCET 7.5 Your last dose was: Your next dose is: Dose:  1 Tab Take 1 Tab by mouth every four (4) hours as needed for Pain. Max Daily Amount: 6 Tabs. Quantity:  50 Tab Refills:  0 CONTINUE these medications which have NOT CHANGED Dose & Instructions Dispensing Information Comments Morning Noon Evening Bedtime  
 levothyroxine 100 mcg tablet Commonly known as:  SYNTHROID Your last dose was: Your next dose is:    
   
   
 Dose:  100 mcg Take 1 tablet by mouth Daily (before breakfast). Quantity:  30 tablet Refills:  2 STOP taking these medications   
 aspirin delayed-release 81 mg tablet Where to Get Your Medications Information on where to get these meds will be given to you by the nurse or doctor. ! Ask your nurse or doctor about these medications  
  ondansetron 4 mg disintegrating tablet  
 oxyCODONE-acetaminophen 7.5-325 mg per tablet Discharge Instructions [address], [address] [facility] Operative Instructions 1. DIET Start a soft diet and progress to usual diet as tolerated, unless otherwise directed. It is important to remember that good overall diet and health promotes healing. 2.  ACTIVITY No heavy exertion or heavy lifting for 10 days. Light activities are permitted but, avoid any stretching or bending of the neck for 10 days. 3.  WOUND CARE A. Apply antibiotic ointment twice daily to suture or staple sites and continue for 2-3 days following removal. 
B. May shower or bathe following surgery but make sure to apply antibiotic ointment prior to doing so and do not submerge the area in water. C. If you have a paper tape dressing, make every attempt to keep it dry until it is removed. 4.  THINGS TO BE CONCERNED ABOUT Please call the office for any of these changes A. Increasing pain B. New red discoloration C. New drainage of any description D. Increasing warmth of wound 5. DRAIN MANAGEMENT A. Follow nursing instructions B. Generally, your doctor will plan to have drains removed between 1 and 3 days. 6. LONG-TERM WOUND HEALING 
A. Expect the wound to have a slight ridge for up to 6 weeks. This is usually by design for optimal wound healing. B. Local numbness is usual around wound sites, and can last up to and beyond 3 months. C. To minimize the prolonged redness or pigmentation around a wound, we recommend use of sunscreen for 3 months or longer on healed wounds. If you have any questions or concerns following your surgery, do not hesitate to contact our office at Office Phone:  [phone] [address] office hours are 8:00 a.m. to 4:30 p.m. You should be able to reach us after hours by calling the regular office number. If for some reason you are not able to reach our [address] [place] [address] through this main number you may call them directly at [phone]. Discharge Orders None [employer] Announcement We are excited to announce that we are making your provider's discharge notes available to you in [employer]. You will see these notes when they are completed and signed by the physician that discharged you from your recent hospital stay. If you have any questions or concerns about any information you see in [employer], please call the Health Information Department where you were seen or reach out to your Primary Care Provider for more information about your plan of care. Introducing [facility]! Dear Vernon Randall: Thank you for requesting a [employer] account. Our records indicate that you already have an active [employer] account. You can access your account anytime at [email or web address] Did you know that you can access your hospital and ER discharge instructions at any time in [employer]?   You can also review all of your test results from your hospital stay or ER visit. Additional Information If you have questions, please visit the Frequently Asked Questions section of the [employer] website at [email or web address] Remember, MyChart is NOT to be used for urgent needs. For medical emergencies, dial 911. Now available from your iPhone and Android! General Information Please provide this summary of care documentation to your next provider. Patient Signature:  ____________________________________________________________ Date:  ____________________________________________________________  
  
[facility] Provider Signature:  ____________________________________________________________ Date:  ____________________________________________________________

## 2017-04-10 NOTE — IP AVS SNAPSHOT
Current Discharge Medication List  
  
START taking these medications Dose & Instructions Dispensing Information Comments Morning Noon Evening Bedtime  
 ondansetron 4 mg disintegrating tablet Commonly known as:  ZOFRAN ODT Your last dose was: Your next dose is:    
   
   
 Dose:  4 mg Take 1 Tab by mouth every eight (8) hours as needed for Nausea. Quantity:  8 Tab Refills:  1  
     
   
   
   
  
 oxyCODONE-acetaminophen 7.5-325 mg per tablet Commonly known as:  PERCOCET 7.5 Your last dose was: Your next dose is:    
   
   
 Dose:  1 Tab Take 1 Tab by mouth every four (4) hours as needed for Pain. Max Daily Amount: 6 Tabs. Quantity:  50 Tab Refills:  0 CONTINUE these medications which have NOT CHANGED Dose & Instructions Dispensing Information Comments Morning Noon Evening Bedtime  
 levothyroxine 100 mcg tablet Commonly known as:  SYNTHROID Your last dose was: Your next dose is:    
   
   
 Dose:  100 mcg Take 1 tablet by mouth Daily (before breakfast). Quantity:  30 tablet Refills:  2 STOP taking these medications   
 aspirin delayed-release 81 mg tablet Where to Get Your Medications Information on where to get these meds will be given to you by the nurse or doctor. ! Ask your nurse or doctor about these medications  
  ondansetron 4 mg disintegrating tablet  
 oxyCODONE-acetaminophen 7.5-325 mg per tablet

## 2017-04-11 VITALS
SYSTOLIC BLOOD PRESSURE: 133 MMHG | BODY MASS INDEX: 33.88 KG/M2 | HEIGHT: 71 IN | RESPIRATION RATE: 16 BRPM | WEIGHT: 242 LBS | HEART RATE: 100 BPM | DIASTOLIC BLOOD PRESSURE: 64 MMHG | TEMPERATURE: 98 F | OXYGEN SATURATION: 95 %

## 2017-04-11 PROCEDURE — 74011250637 HC RX REV CODE- 250/637: Performed by: OTOLARYNGOLOGY

## 2017-04-11 RX ADMIN — LEVOTHYROXINE SODIUM 100 MCG: 100 TABLET ORAL at 06:38

## 2017-04-11 RX ADMIN — OXYCODONE HYDROCHLORIDE AND ACETAMINOPHEN 1 TABLET: 7.5; 325 TABLET ORAL at 09:16

## 2017-04-11 RX ADMIN — OXYCODONE HYDROCHLORIDE AND ACETAMINOPHEN 1 TABLET: 7.5; 325 TABLET ORAL at 00:57

## 2017-04-11 NOTE — PROGRESS NOTES
Bedside shift change report given to  [place], RN (oncoming nurse) by Homa Napoles RN (offgoing nurse). Report included the following information SBAR, Kardex, Procedure Summary, Intake/Output and MAR.

## 2017-04-11 NOTE — PROGRESS NOTES
Otolaryngology, Head and Neck Surgery      Nausea/vomitting yesterday. Better now. Current Facility-Administered Medications   Medication Dose Route Frequency    levothyroxine (SYNTHROID) tablet 100 mcg  100 mcg Oral ACB    sodium chloride (NS) flush 5-10 mL  5-10 mL IntraVENous Q8H    sodium chloride (NS) flush 5-10 mL  5-10 mL IntraVENous PRN    oxyCODONE-acetaminophen (PERCOCET 7.5) 7.5-325 mg per tablet 1 Tab  1 Tab Oral Q4H PRN    morphine injection 2 mg  2 mg IntraVENous Q4H PRN    ondansetron (ZOFRAN) injection 4 mg  4 mg IntraVENous Q4H PRN       No results found for this or any previous visit (from the past 24 hour(s)). Visit Vitals    /74 (BP 1 Location: Left arm, BP Patient Position: At rest)    Pulse (!) 104    Temp 98.2 °F (36.8 °C)    Resp 18    Ht 5' 11\" (1.803 m)    Wt 109.8 kg (242 lb)    SpO2 96%    BMI 33.75 kg/m2       Intake/Output Summary (Last 24 hours) at 04/11/17 0845  Last data filed at 04/11/17 3511   Gross per 24 hour   Intake             1500 ml   Output              250 ml   Net             1250 ml     Gen: nad, awake, alert  Neck: soft, flat, incision intact    Lower extremities: no calf tenderness    A:   Hospital Problems  Date Reviewed: 4/10/2017          Codes Class Noted POA    Malignant neoplasm of tonsil ([facility] [address] ICD-10-CM: C09.9  ICD-9-CM: 146.0  4/10/2017 Unknown                  Post op day 1 from neck dissection.     Plan:  -d/c home

## 2017-04-11 NOTE — PROGRESS NOTES
TRANSFER - IN REPORT:    Verbal report received from Corinne Reynolds RN(name) on Gopal Boogie  being received from PACU(unit) for routine progression of care      Report consisted of patients Situation, Background, Assessment and   Recommendations(SBAR). Information from the following report(s) SBAR, Kardex, Procedure Summary, Intake/Output and MAR was reviewed with the receiving nurse. Opportunity for questions and clarification was provided. Assessment completed upon patients arrival to unit and care assumed.

## 2017-04-11 NOTE — PROGRESS NOTES
Primary Nurse Karey Comer RN and Joel Huerta RN performed a dual skin assessment on this patient No impairment noted  Cordell score is 22

## 2017-04-11 NOTE — DISCHARGE INSTRUCTIONS
[facility]    Head and Neck Post Operative Instructions    1. DIET  Start a soft diet and progress to usual diet as tolerated, unless otherwise directed. It is important to remember that good overall diet and health promotes healing. 2.  ACTIVITY  No heavy exertion or heavy lifting for 10 days. Light activities are permitted but, avoid any stretching or bending of the neck for 10 days. 3.  WOUND CARE  A. Apply antibiotic ointment twice daily to suture or staple sites and continue for 2-3 days following removal.  B. May shower or bathe following surgery but make sure to apply antibiotic ointment prior to doing so and do not submerge the area in water. C. If you have a paper tape dressing, make every attempt to keep it dry until it is removed. 4.  THINGS TO BE CONCERNED ABOUT  Please call the office for any of these changes  A. Increasing pain  B. New red discoloration  C. New drainage of any description  D. Increasing warmth of wound    5. DRAIN MANAGEMENT  A. Follow nursing instructions  B. Generally, your doctor will plan to have drains removed between 1 and 3 days. 6. LONG-TERM WOUND HEALING  A. Expect the wound to have a slight ridge for up to 6 weeks. This is usually by design for optimal wound healing. B. Local numbness is usual around wound sites, and can last up to and beyond 3 months. C. To minimize the prolonged redness or pigmentation around a wound, we recommend use of sunscreen for 3 months or longer on healed wounds. If you have any questions or concerns following your surgery, do not hesitate to contact our office at    Office Phone:  [phone] office hours are 8:00 a.m. to 4:30 p.m. You should be able to reach us after hours by calling the regular office number.   If for some reason you are not able to reach our [address] [place] [address] service through this main number you may call them directly at [phone].

## 2017-04-11 NOTE — PROGRESS NOTES
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

## 2017-04-11 NOTE — PROGRESS NOTES
Chart reviewed. CM met with pt and wife Michelle Leonard to introduce role of CM and discuss discharge needs. Pt lives with wife in a private residence in [place]. Pt is independent with ADLs and uses no DME. Pt is currently employed. Confirmed PCP is Chloé Arboleda MD. Pt gets medications at [facility] on [address] Family will transport pt home via car at discharge. No needs expressed by pt at this time. CM will be available if needs arise. Care Management Interventions  PCP Verified by CM: Yes Chloé Arboleda MD ([phone]))  Mode of Transport at Discharge:  Other (see comment) (family)  Transition of Care Consult (CM Consult): Discharge Planning  MyChart Signup: No  Discharge Durable Medical Equipment: No  Physical Therapy Consult: No  Occupational Therapy Consult: No  Speech Therapy Consult: No  Current Support Network: Lives with Spouse, Own Home  Confirm Follow Up Transport: Family  Plan discussed with Pt/Family/Caregiver: Yes  Freedom of Choice Offered: Yes  Discharge Location  Discharge Placement: Home     ANAYELI Dennison/CRM

## 2018-03-08 ENCOUNTER — OFFICE VISIT (OUTPATIENT)
Dept: SLEEP MEDICINE | Age: 53
End: 2018-03-08

## 2018-03-08 VITALS
BODY MASS INDEX: 34.72 KG/M2 | WEIGHT: 248 LBS | OXYGEN SATURATION: 98 % | HEIGHT: 71 IN | HEART RATE: 71 BPM | DIASTOLIC BLOOD PRESSURE: 104 MMHG | SYSTOLIC BLOOD PRESSURE: 155 MMHG

## 2018-03-08 DIAGNOSIS — G47.33 OBSTRUCTIVE SLEEP APNEA SYNDROME: Primary | ICD-10-CM

## 2018-03-08 DIAGNOSIS — I25.10 CORONARY ARTERY DISEASE INVOLVING NATIVE CORONARY ARTERY OF NATIVE HEART WITHOUT ANGINA PECTORIS: ICD-10-CM

## 2018-03-08 DIAGNOSIS — I10 ESSENTIAL HYPERTENSION: ICD-10-CM

## 2018-03-08 RX ORDER — ACETAMINOPHEN 500 MG
TABLET ORAL
COMMUNITY
End: 2021-04-01

## 2018-03-08 RX ORDER — NAPROXEN 250 MG/1
TABLET ORAL 2 TIMES DAILY WITH MEALS
Status: ON HOLD | COMMUNITY
End: 2018-12-05

## 2018-03-08 NOTE — PATIENT INSTRUCTIONS
[address] [place]. [place], [address]  Tel.  [phone]  Fax. 100 [facility] 60  Verlan Manifold, [address]  Tel.  [phone]  Fax. [phone] [address] [facility] [address]  Tel.  [phone]  Fax. [phone]     Sleep Apnea: After Your Visit  Your Care Instructions  Sleep apnea occurs when you frequently stop breathing for 10 seconds or longer during sleep. It can be mild to severe, based on the number of times per hour that you stop breathing or have slowed breathing. Blocked or narrowed airways in your nose, mouth, or throat can cause sleep apnea. Your airway can become blocked when your throat muscles and tongue relax during sleep. Sleep apnea is common, occurring in 1 out of 20 individuals. Individuals having any of the following characteristics should be evaluated and treated right away due to high risk and detrimental consequences from untreated sleep apnea:  1. Obesity  2. Congestive Heart failure  3. Atrial Fibrillation  4. Uncontrolled Hypertension  5. Type II Diabetes  6. Night-time Arrhythmias  7. Stroke  8. Pulmonary Hypertension  9. High-risk Driving Populations (pilots, truck drivers, etc.)  10. Patients Considering Weight-loss Surgery    How do you know you have sleep apnea? You probably have sleep apnea if you answer 'yes' to 3 or more of the following questions:  S - Have you been told that you Snore? T - Are you often Tired during the day? O - Has anyone Observed you stop breathing while sleeping? P- Do you have (or are being treated for) high blood Pressure? B - Are you obese (Body Mass Index > 35)? A - Is your Age 48years old or older? N - Is your Neck size greater than 16 inches? G - Are you male Gender? A sleep physician can prescribe a breathing device that prevents tissues in the throat from blocking your airway.  Or your doctor may recommend using a dental device (oral breathing device) to help keep your airway open. In some cases, surgery may be needed to remove enlarged tissues in the throat. Follow-up care is a key part of your treatment and safety. Be sure to make and go to all appointments, and call your doctor if you are having problems. It's also a good idea to know your test results and keep a list of the medicines you take. How can you care for yourself at home? · Lose weight, if needed. It may reduce the number of times you stop breathing or have slowed breathing. · Go to bed at the same time every night. · Sleep on your side. It may stop mild apnea. If you tend to roll onto your back, sew a pocket in the back of your pajama top. Put a tennis ball into the pocket, and stitch the pocket shut. This will help keep you from sleeping on your back. · Avoid alcohol and medicines such as sleeping pills and sedatives before bed. · Do not smoke. Smoking can make sleep apnea worse. If you need help quitting, talk to your doctor about stop-smoking programs and medicines. These can increase your chances of quitting for good. · Prop up the head of your bed 4 to 6 inches by putting bricks under the legs of the bed. · Treat breathing problems, such as a stuffy nose, caused by a cold or allergies. · Use a continuous positive airway pressure (CPAP) breathing machine if lifestyle changes do not help your apnea and your doctor recommends it. The machine keeps your airway from closing when you sleep. · If CPAP does not help you, ask your doctor whether you should try other breathing machines. A bilevel positive airway pressure machine has two types of air pressureâone for breathing in and one for breathing out. Another device raises or lowers air pressure as needed while you breathe. · If your nose feels dry or bleeds when using one of these machines, talk with your doctor about increasing moisture in the air. A humidifier may help.   · If your nose is runny or stuffy from using a breathing machine, talk with your doctor about using decongestants or a corticosteroid nasal spray. When should you call for help? Watch closely for changes in your health, and be sure to contact your doctor if:  · You still have sleep apnea even though you have made lifestyle changes. · You are thinking of trying a device such as CPAP. · You are having problems using a CPAP or similar machine. Where can you learn more? Go to [employer]. Enter M947 in the search box to learn more about \"Sleep Apnea: After Your Visit. \"   © 5649-6615 Healthwise, [employer] Care instructions adapted under license by [employer] (which disclaims liability or warranty for this information). This care instruction is for use with your licensed healthcare professional. If you have questions about a medical condition or this instruction, always ask your healthcare professional. Alexander Charleen any warranty or liability for your use of this information. PROPER SLEEP HYGIENE    What to avoid  · Do not have drinks with caffeine, such as coffee or black tea, for 8 hours before bed. · Do not smoke or use other types of tobacco near bedtime. Nicotine is a stimulant and can keep you awake. · Avoid drinking alcohol late in the evening, because it can cause you to wake in the middle of the night. · Do not eat a big meal close to bedtime. If you are hungry, eat a light snack. · Do not drink a lot of water close to bedtime, because the need to urinate may wake you up during the night. · Do not read or watch TV in bed. Use the bed only for sleeping and sexual activity. What to try  · Go to bed at the same time every night, and wake up at the same time every morning. Do not take naps during the day. · Keep your bedroom quiet, dark, and cool. · Get regular exercise, but not within 3 to 4 hours of your bedtime. .  · Sleep on a comfortable pillow and mattress.   · If watching the clock makes you anxious, turn it facing away from you so you cannot see the time. · If you worry when you lie down, start a worry book. Well before bedtime, write down your worries, and then set the book and your concerns aside. · Try meditation or other relaxation techniques before you go to bed. · If you cannot fall asleep, get up and go to another room until you feel sleepy. Do something relaxing. Repeat your bedtime routine before you go to bed again. · Make your house quiet and calm about an hour before bedtime. Turn down the lights, turn off the TV, log off the computer, and turn down the volume on music. This can help you relax after a busy day. Drowsy Driving  The [address] [place] [address] Road Traffic Safety Administration cites drowsiness as a causing factor in more than 669,110 police reported crashes annually, resulting in 76,000 injuries and 1,500 deaths. Other surveys suggest 55% of people polled have driven while drowsy in the past year, 23% had fallen asleep but not crashed, 3% crashed, and 2% had and accident due to drowsy driving. Who is at risk? Young Drivers: One study of drowsy driving accidents states that 55% of the drivers were under 25 years. Of those, 75% were male. Shift Workers and Travelers: People who work overnight or travel across time zones frequently are at higher risk of experiencing Circadian Rhythm Disorders. They are trying to work and function when their body is programed to sleep. Sleep Deprived: Lack of sleep has a serious impact on your ability to pay attention or focus on a task. Consistently getting less than the average of 8 hours your body needs creates partial or cumulative sleep deprivation. Untreated Sleep Disorders: Sleep Apnea, Narcolepsy, R.L.S., and other sleep disorders (untreated) prevent a person from getting enough restful sleep. This leads to excessive daytime sleepiness and increases the risk for drowsy driving accidents by up to 7 times.   Medications / Alcohol: Even over the counter medications can cause drowsiness. Medications that impair a drivers attention should have a warning label. Alcohol naturally makes you sleepy and on its own can cause accidents. Combined with excessive drowsiness its effects are amplified. Signs of Drowsy Driving:   * You don't remember driving the last few miles   * You may drift out of your shani   * You are unable to focus and your thoughts wander   * You may yawn more often than normal   * You have difficulty keeping your eyes open / nodding off   * Missing traffic signs, speeding, or tailgating  Prevention-   Good sleep hygiene, lifestyle and behavioral choices have the most impact on drowsy driving. There is no substitute for sleep and the average person requires 8 hours nightly. If you find yourself driving drowsy, stop and sleep. Consider the sleep hygiene tips provided during your visit as well. Medication Refill Policy: Refills for all medications require 1 week advance notice. Please have your pharmacy fax a refill request. We are unable to fax, or call in \"controled substance\" medications and you will need to pick these prescriptions up from our office. [employer] Activation    Thank you for requesting access to [employer]. Please follow the instructions below to securely access and download your online medical record. [employer] allows you to send messages to your doctor, view your test results, renew your prescriptions, schedule appointments, and more. How Do I Sign Up? 1. In your internet browser, go to [email or web address]. 2. Click on the First Time User? Click Here link in the Sign In box. You will see the New Member Sign Up page. 3. Enter your [employer] Access Code exactly as it appears below. You will not need to use this code after youve completed the sign-up process. If you do not sign up before the expiration date, you must request a new code. [employer] Access Code:  Activation code not generated  Current [employer] Status: Active (This is the date your [employer] access code will )    4. Enter the last four digits of your Social Security Number (xxxx) and Date of Birth (mm/dd/yyyy) as indicated and click Submit. You will be taken to the next sign-up page. 5. Create a [employer] ID. This will be your [employer] login ID and cannot be changed, so think of one that is secure and easy to remember. 6. Create a [employer] password. You can change your password at any time. 7. Enter your Password Reset Question and Answer. This can be used at a later time if you forget your password. 8. Enter your e-mail address. You will receive e-mail notification when new information is available in [address] 9. Click Sign Up. You can now view and download portions of your medical record. 10. Click the Download Summary menu link to download a portable copy of your medical information. Additional Information    If you have questions, please call [phone]. Remember, [employer] is NOT to be used for urgent needs. For medical emergencies, dial 911.

## 2018-03-08 NOTE — PROGRESS NOTES
[address] [place]. [place], [address]  Tel.  [phone]  Fax. 100 [facility] 60  [place], [address] [facility]  Tel.  [phone]  Fax. [phone] [address] [place]   Tel.  [phone]  Fax. [phone]         Subjective:      Carmelita Zhou is an 46 y.o. male referred for evaluation for a sleep disorder. He complains of snoring, snorting associated with excessive daytime sleepiness. Symptoms began a few years ago, gradually worsening since that time. He usually can fall asleep in 20 minutes. Family or house members note snoring. He denies falling asleep while at work, driving. Carmelita Zhou does wake up frequently at night. He is bothered by waking up too early and left unable to get back to sleep. He actually sleeps about 4 hours at night and wakes up about 6 times during the night. He does work shifts:  First Shift. Mitchell Hinton indicates he does get too little sleep at night. His bedtime is 2100. He awakens at 0000. He does take naps. He takes 4 naps a week lasting 45 min to an hour. He has the following observed behaviors: Loud snoring, Twitching of legs or feet, Grinding teeth;  . 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 No Known Allergies      Current Outpatient Prescriptions:     acetaminophen (TYLENOL EXTRA STRENGTH) 500 mg tablet, Take  by mouth every six (6) hours as needed for Pain., Disp: , Rfl:     naproxen (NAPROSYN) 250 mg tablet, Take  by mouth two (2) times daily (with meals). , Disp: , Rfl:     levothyroxine (SYNTHROID) 100 mcg tablet, Take 1 tablet by mouth Daily (before breakfast). , Disp: 30 tablet, Rfl: 2    ondansetron (ZOFRAN ODT) 4 mg disintegrating tablet, Take 1 Tab by mouth every eight (8) hours as needed for Nausea., Disp: 8 Tab, Rfl: 1    oxyCODONE-acetaminophen (PERCOCET 7.5) 7.5-325 mg per tablet, Take 1 Tab by mouth every four (4) hours as needed for Pain. Max Daily Amount: 6 Tabs., Disp: 50 Tab, Rfl: 0     He  has a past medical history of CAD (coronary artery disease); Cancer [facility]); GERD (gastroesophageal reflux disease); Hypertension; Hypothyroidism; Psychiatric disorder; and Seizures ([facility] [address] (1983). He  has a past surgical history that includes pr cardiac surg procedure unlist (2015); hx other surgical; and hx heent. He family history includes Heart Attack in his father; Heart Disease in his father; Hypertension in his father; Thyroid Disease in his mother. There is no history of Anesth Problems. He  reports that he quit smoking about 14 months ago. He has a 19.00 pack-year smoking history. He quit smokeless tobacco use about 37 years ago. He reports that he drinks about 1.2 - 1.8 oz of alcohol per week  He reports that he does not use illicit drugs. Review of Systems:  Constitutional:+ weight gain. Eyes:  No blurred vision. CVS:  No significant chest pain  Pulm:  No significant shortness of breath  GI:  No significant nausea or vomiting  :  No significant nocturia  Musculoskeletal:  No significant joint pain at night  Skin:  No significant rashes  Neuro:  No significant dizziness   Psych:  No active mood issues    Sleep Review of Systems: notable for no difficulty falling asleep; +frequent awakenings at night;  regular dreaming noted; no nightmares ; no early morning headaches; no memory problems; no concentration issues; no history of any automobile or occupational accidents due to daytime drowsiness.       Objective:     Visit Vitals    BP (!) 155/104  Comment: 152 89    Pulse 71    Ht 5' 11\" (1.803 m)    Wt 248 lb (112.5 kg)    SpO2 98%    BMI 34.59 kg/m2         General:   Not in acute distress   Eyes:  Anicteric sclerae, no obvious strabismus   Nose:  No obvious nasal septum deviation    Oropharynx:   Class 3 oropharyngeal outlet, thick tongue base, enlarged and boggy uvula shifted to right side, low-lying soft palate, narrow tonsilo-pharyngeal pilars   Tonsils:   left tonsil is normal, right absent    Neck:   Neck circ. in \"inches\": 17.25; midline trachea   Chest/Lungs:  Equal lung expansion, clear on auscultation    CVS:  Normal rate, regular rhythm; no JVD   Skin:  Warm to touch; no obvious rashes   Neuro:  No focal deficits ; no obvious tremor    Psych:  Normal affect,  normal countenance;          Assessment:       ICD-10-CM ICD-9-CM    1. Obstructive sleep apnea syndrome G47.33 327.23 SLEEP STUDY UNATTENDED, 4 CHANNEL   2. Coronary artery disease involving native coronary artery of native heart without angina pectoris I25.10 414.01    3. Essential hypertension I10 401.9          Plan:     * The patient currently has a Moderate Risk for having sleep apnea. STOP-BANG score 5.  * PSG was ordered for initial evaluation. I have reviewed the different types of sleep studies. Attended sleep studies and home sleep apnea tests. Home sleep testing tests only for the presence and severity of sleep apnea. he understands that if the HSAT does not provide reliable result(such as poor data/failed HSAT recording), he may have to repeat the HSAT or come in for an attended polysomnogram. ,* He was provided information on sleep apnea including coresponding risk factors and the importance of proper treatment. * Counseling was provided regarding proper sleep hygiene and safe driving. * Treatment options for sleep apnea were reviewed. he is not against a trial of PAP if found to have significant sleep apnea. The treatment plan was reviewed with the patient in detail and reviewed with the patient and the lead technologist. he understands that the lead technologist will be calling him  with the results and assisting with the next step in the treatment plan as outlined today during the consultation with me. All of his questions were addressed.    2. Coronary Artery Disease - he continues on his current regimen. I have reviewed the relationship between heart disease and sleep disordered breathing. 3. Hypertension - he continues on his current regimen. I have reviewed the relationship between hypertension as it relates to sleep-disordered breathing. Thank you for allowing us to participate in your patient's medical care. We'll keep you updated on these investigations.   Yokasta Nava MD  Diplomate in Sleep Medicine  [facility]

## 2018-03-08 NOTE — PROGRESS NOTES
[address] [facility] [place]. [place], [address]  Tel.  [phone]  Fax. 100 [facility] 60  [place], [address] [facility]  Tel.  [phone]  Fax. [phone] [address] [address] [facility]   Tel.  [phone]  Fax. [phone]       S>Wallace Ramírez is a 46 y.o. male seen today to receive a home sleep testing unit (HST). · Patient was educated on proper hookup and operation of the HST. · Instruction forms and documentation were reviewed and signed. · The patient demonstrated good understanding of the HST. O>    Visit Vitals    BP (!) 155/104  Comment: 152 89    Pulse 71    Ht 5' 11\" (1.803 m)    Wt 248 lb (112.5 kg)    SpO2 98%    BMI 34.59 kg/m2    Neck circ. in \"inches\": 17.25    A>  1. Obstructive sleep apnea syndrome    2. Coronary artery disease involving native coronary artery of native heart without angina pectoris    3. Essential hypertension          P>  · General information regarding operations and maintenance of the device was provided. · He was provided information on sleep apnea including coresponding risk factors and the importance of proper treatment. · Follow-up appointment was made to return the HST. He will be contacted once the results have been reviewed. · He was asked to contact our office for any problems regarding his home sleep test study.

## 2018-03-12 ENCOUNTER — DOCUMENTATION ONLY (OUTPATIENT)
Dept: SLEEP MEDICINE | Age: 53
End: 2018-03-12

## 2018-03-13 ENCOUNTER — TELEPHONE (OUTPATIENT)
Dept: SLEEP MEDICINE | Age: 53
End: 2018-03-13

## 2018-03-13 NOTE — TELEPHONE ENCOUNTER
Results of sleep study in [facility]  Lead tech to convey results to patient  Results of HSAT in [facility]    The home sleep apnea test showed AHI - 0.3.hour. THe lowest oxygen saturation was 88%. This correlates with a test that is negative for significant sleep apnea. We had discussed treatment plan at initial consultation. Based on the results of the home sleep apnea test, APAP is not indicated at this time. Optimizing sleep habits by keeping bedtime and waketime regular;with emphasis ensuring sufficient total sleep time;(he should ensure 7 hours of sleep during his sleep periods on working and non-working days) avoiding caffeine after 2 pm; avoid looking at the clock during the night. Ideally, the clock face should be turned away. A regular exercise schedule, at least 3 hours before bedtime, would be beneficial to improving sleep quality. avoid evening light and to use sunglasses in the late afternoon. Watching TV, using laptops, tablets and smartphones in the evening was discouraged. keep the bedroom cool and dark. Pets should not be allowed to sleep in the bed. Repeat HSAT is indicated if symptoms worsen.

## 2018-03-13 NOTE — TELEPHONE ENCOUNTER
HSAT Returned    Date of Study: 3/8/2018    The following information was gathered from the patients study log:    · Lights off: 21:00  · Estimated sleep onset: 22:30    · Awakened a total of 6 times. · The patient felt they slept 5.45 hours. · Patient took nothing before starting the test.  · Sleep quality was worse compared to a usual nights sleep.     Further information provided: N/A

## 2018-04-11 NOTE — TELEPHONE ENCOUNTER
Reviewed sleep study results with patient.  He expressed understanding and is willing to proceed with implementing of some of Dr. Zhen Montenegro recommendations

## 2018-12-03 ENCOUNTER — APPOINTMENT (OUTPATIENT)
Dept: GENERAL RADIOLOGY | Age: 53
End: 2018-12-03
Attending: STUDENT IN AN ORGANIZED HEALTH CARE EDUCATION/TRAINING PROGRAM
Payer: COMMERCIAL

## 2018-12-03 ENCOUNTER — APPOINTMENT (OUTPATIENT)
Dept: CT IMAGING | Age: 53
End: 2018-12-03
Attending: PHYSICIAN ASSISTANT
Payer: COMMERCIAL

## 2018-12-03 ENCOUNTER — HOSPITAL ENCOUNTER (EMERGENCY)
Age: 53
Discharge: HOME | End: 2018-12-03
Attending: STUDENT IN AN ORGANIZED HEALTH CARE EDUCATION/TRAINING PROGRAM | Admitting: EMERGENCY MEDICINE
Payer: COMMERCIAL

## 2018-12-03 VITALS
RESPIRATION RATE: 17 BRPM | OXYGEN SATURATION: 95 % | HEART RATE: 76 BPM | WEIGHT: 255 LBS | SYSTOLIC BLOOD PRESSURE: 141 MMHG | TEMPERATURE: 97.7 F | HEIGHT: 70 IN | BODY MASS INDEX: 36.51 KG/M2 | DIASTOLIC BLOOD PRESSURE: 75 MMHG

## 2018-12-03 DIAGNOSIS — R07.9 CHEST PAIN, UNSPECIFIED TYPE: Primary | ICD-10-CM

## 2018-12-03 LAB
ALBUMIN SERPL-MCNC: 4.1 G/DL (ref 3.5–5)
ALBUMIN/GLOB SERPL: 1.2 {RATIO} (ref 1.1–2.2)
ALP SERPL-CCNC: 47 U/L (ref 45–117)
ALT SERPL-CCNC: 58 U/L (ref 12–78)
ANION GAP SERPL CALC-SCNC: 10 MMOL/L (ref 5–15)
AST SERPL-CCNC: 22 U/L (ref 15–37)
BASOPHILS # BLD: 0 K/UL (ref 0–0.1)
BASOPHILS NFR BLD: 0 % (ref 0–1)
BILIRUB SERPL-MCNC: 0.3 MG/DL (ref 0.2–1)
BUN SERPL-MCNC: 21 MG/DL (ref 6–20)
BUN/CREAT SERPL: 20 (ref 12–20)
CALCIUM SERPL-MCNC: 9.1 MG/DL (ref 8.5–10.1)
CHLORIDE SERPL-SCNC: 103 MMOL/L (ref 97–108)
CO2 SERPL-SCNC: 27 MMOL/L (ref 21–32)
COMMENT, HOLDF: NORMAL
CREAT SERPL-MCNC: 1.06 MG/DL (ref 0.7–1.3)
DIFFERENTIAL METHOD BLD: ABNORMAL
EOSINOPHIL # BLD: 0.1 K/UL (ref 0–0.4)
EOSINOPHIL NFR BLD: 1 % (ref 0–7)
ERYTHROCYTE [DISTWIDTH] IN BLOOD BY AUTOMATED COUNT: 12.4 % (ref 11.5–14.5)
GLOBULIN SER CALC-MCNC: 3.5 G/DL (ref 2–4)
GLUCOSE SERPL-MCNC: 111 MG/DL (ref 65–100)
HCT VFR BLD AUTO: 36.1 % (ref 36.6–50.3)
HGB BLD-MCNC: 12.8 G/DL (ref 12.1–17)
IMM GRANULOCYTES # BLD: 0 K/UL (ref 0–0.04)
IMM GRANULOCYTES NFR BLD AUTO: 0 % (ref 0–0.5)
LYMPHOCYTES # BLD: 1.1 K/UL (ref 0.8–3.5)
LYMPHOCYTES NFR BLD: 25 % (ref 12–49)
MCH RBC QN AUTO: 31.1 PG (ref 26–34)
MCHC RBC AUTO-ENTMCNC: 35.5 G/DL (ref 30–36.5)
MCV RBC AUTO: 87.6 FL (ref 80–99)
MONOCYTES # BLD: 0.6 K/UL (ref 0–1)
MONOCYTES NFR BLD: 15 % (ref 5–13)
NEUTS SEG # BLD: 2.5 K/UL (ref 1.8–8)
NEUTS SEG NFR BLD: 59 % (ref 32–75)
NRBC # BLD: 0 K/UL (ref 0–0.01)
NRBC BLD-RTO: 0 PER 100 WBC
PLATELET # BLD AUTO: 202 K/UL (ref 150–400)
PMV BLD AUTO: 9.4 FL (ref 8.9–12.9)
POTASSIUM SERPL-SCNC: 3.7 MMOL/L (ref 3.5–5.1)
PROT SERPL-MCNC: 7.6 G/DL (ref 6.4–8.2)
RBC # BLD AUTO: 4.12 M/UL (ref 4.1–5.7)
SAMPLES BEING HELD,HOLD: NORMAL
SODIUM SERPL-SCNC: 140 MMOL/L (ref 136–145)
TROPONIN I SERPL-MCNC: <0.05 NG/ML
TROPONIN I SERPL-MCNC: <0.05 NG/ML
WBC # BLD AUTO: 4.2 K/UL (ref 4.1–11.1)

## 2018-12-03 PROCEDURE — 36415 COLL VENOUS BLD VENIPUNCTURE: CPT

## 2018-12-03 PROCEDURE — 71275 CT ANGIOGRAPHY CHEST: CPT

## 2018-12-03 PROCEDURE — 93005 ELECTROCARDIOGRAM TRACING: CPT

## 2018-12-03 PROCEDURE — 84484 ASSAY OF TROPONIN QUANT: CPT

## 2018-12-03 PROCEDURE — 80053 COMPREHEN METABOLIC PANEL: CPT

## 2018-12-03 PROCEDURE — 74011250637 HC RX REV CODE- 250/637: Performed by: PHYSICIAN ASSISTANT

## 2018-12-03 PROCEDURE — 99285 EMERGENCY DEPT VISIT HI MDM: CPT

## 2018-12-03 PROCEDURE — 71046 X-RAY EXAM CHEST 2 VIEWS: CPT

## 2018-12-03 PROCEDURE — 85025 COMPLETE CBC W/AUTO DIFF WBC: CPT

## 2018-12-03 PROCEDURE — 74011636320 HC RX REV CODE- 636/320: Performed by: EMERGENCY MEDICINE

## 2018-12-03 RX ORDER — METOPROLOL TARTRATE 25 MG/1
25 TABLET, FILM COATED ORAL
Status: COMPLETED | OUTPATIENT
Start: 2018-12-03 | End: 2018-12-03

## 2018-12-03 RX ORDER — GUAIFENESIN 100 MG/5ML
81 LIQUID (ML) ORAL
Status: COMPLETED | OUTPATIENT
Start: 2018-12-03 | End: 2018-12-03

## 2018-12-03 RX ORDER — GUAIFENESIN 100 MG/5ML
81 LIQUID (ML) ORAL DAILY
Qty: 30 TAB | Refills: 0 | Status: SHIPPED | OUTPATIENT
Start: 2018-12-03 | End: 2018-12-21

## 2018-12-03 RX ORDER — METOPROLOL TARTRATE 25 MG/1
25 TABLET, FILM COATED ORAL 2 TIMES DAILY
Qty: 60 TAB | Refills: 0 | Status: SHIPPED | OUTPATIENT
Start: 2018-12-03 | End: 2019-01-02

## 2018-12-03 RX ADMIN — ASPIRIN 81 MG 81 MG: 81 TABLET ORAL at 21:43

## 2018-12-03 RX ADMIN — METOPROLOL TARTRATE 25 MG: 25 TABLET ORAL at 21:43

## 2018-12-03 RX ADMIN — IOPAMIDOL 80 ML: 755 INJECTION, SOLUTION INTRAVENOUS at 19:26

## 2018-12-03 NOTE — ED TRIAGE NOTES
Chest pain x 2 weeks, [facility] for further eval. No chest pain at this time, denies n/v/fevers. Hx 2 stents in 2014

## 2018-12-03 NOTE — ED PROVIDER NOTES
48 y.o. male with past medical history significant for HTN, CAD s/p stent, hypothyroidism, GERD, seizures, tonsillar cancer (in remission), and depression, who presents from [facility] with chief complaint of chest pain. The patient states that for the past 2 weeks he has had episodes of shortness of breath and bilateral neck/jaw pain that occur with exertion and resolve after resting. Pt notes he was seen at [facility] this afternoon and was sent to [facility] ED for further evaluation. Pt states he does not have chest pain at this time. Pt notes he had a stent placed in 2015. He also reports recently establishing care with a new PCP and was switched to a new blood pressure medication 2 months ago. He reports travelling to [facility] in September, but denies recent surgeries/hospitalizations, active cancer, hemoptysis, oral hormone replacement, asymmetrical leg pain/swelling, or previous DVT/PE. Pt denies N/V and fever. There are no other acute medical concerns at this time. PCP: Corie Stevens MD 
 
Note written by Ольга Pierce, as dictated by Rose Hutton MD 5:46 PM 
 
 
The history is provided by the patient. Past Medical History:  
Diagnosis Date  CAD (coronary artery disease)  Cancer (HCC)   
 tonsil cancer  GERD (gastroesophageal reflux disease)  Hypertension  Hypothyroidism  Psychiatric disorder   
 depression  Seizures ([facility] [address] 1983 Past Surgical History:  
Procedure Laterality Date  CARDIAC SURG PROCEDURE UNLIST  2015  
 stent  HX HEENT    
 tonsillectomy for malignant tumor  HX OTHER SURGICAL    
 tonsil cancer (removed) Family History:  
Problem Relation Age of Onset  Thyroid Disease Mother  Heart Attack Father  Heart Disease Father   
     cabg  x4 vessel  Hypertension Father  Anesth Problems Neg Hx Social History Socioeconomic History  Marital status:  Spouse name: Not on file  Number of children: Not on file  Years of education: Not on file  Highest education level: Not on file Social Needs  Financial resource strain: Not on file  Food insecurity - worry: Not on file  Food insecurity - inability: Not on file  Transportation needs - medical: Not on file  Transportation needs - non-medical: Not on file Occupational History  Not on file Tobacco Use  Smoking status: Former Smoker Packs/day: 0.50 Years: 38.00 Pack years: 19.00 Last attempt to quit: 2017 Years since quittin.9  Smokeless tobacco: Former User Quit date: 1980 Substance and Sexual Activity  Alcohol use: Yes Alcohol/week: 1.2 - 1.8 oz Types: 2 - 3 Cans of beer per week  Drug use: No  
 Sexual activity: Not on file Other Topics Concern  Not on file Social History Narrative  Not on file ALLERGIES: Patient has no known allergies. Review of Systems Constitutional: Negative for chills, diaphoresis and fever. HENT:  
     + jaw pain with exertion Respiratory: Positive for cough (mild, dry) and shortness of breath. Cardiovascular: Positive for chest pain. Gastrointestinal: Negative for diarrhea, nausea and vomiting. Constipation: with exertion. Musculoskeletal: Positive for neck pain. Neurological: Negative for syncope and light-headedness. All other systems reviewed and are negative. Vitals:  
 18 1744 18 1812 BP: (!) 177/91 Pulse: 78 Resp: 14 Temp: 97.7 °F (36.5 °C) SpO2: 98% 97% Weight: 115.7 kg (255 lb) Height: 5' 10\" (1.778 m) Physical Exam  
Constitutional: He is oriented to person, place, and time. He appears well-developed and well-nourished. No distress. HENT:  
Head: Normocephalic and atraumatic. Eyes: Conjunctivae and EOM are normal.  
Neck: Normal range of motion. Neck supple.   
Minimal right-sided anterior neck tenderness, patient states is baseline due to previous surgeries. Cardiovascular: Normal rate, regular rhythm and normal heart sounds. Trace pitting edema of bilateral lower extremities. Pulmonary/Chest: Effort normal and breath sounds normal.  
Abdominal: Soft. Bowel sounds are normal. He exhibits no distension. There is no tenderness. There is no rebound and no guarding. Musculoskeletal:  
Bilateral calves soft and non-tender. Neurological: He is alert and oriented to person, place, and time. Skin: Skin is warm and dry. He is not diaphoretic. Nursing note and vitals reviewed. MDM Number of Diagnoses or Management Options Chest pain, unspecified type:  
  
Amount and/or Complexity of Data Reviewed Clinical lab tests: ordered and reviewed Tests in the radiology section of CPT®: ordered and reviewed Discuss the patient with other providers: yes (Dr. Nadeem Brown, ED attending) Patient Progress Patient progress: stable Procedures ED EKG interpretation: 
Rhythm: normal sinus rhythm; and regular . Rate (approx.): 74; Axis: normal; ST/T wave: T wave inverted in anterior leads; no significant change compared to 9/30/14. 
  
 
 
 
48 y.o. male with past medical history significant for HTN, CAD s/p stent, hypothyroidism, GERD, seizures, tonsillar cancer (in remission), and depression, who presents from [facility] with chief complaint of chest pain. History and exam concerning for unstable angina. Troponin is negative x2, EKG without acute ischemic changes. CTA chest is negative for PE, pneumonia, aortic aneurysm/dissection, or other acute abnormalities. Will consult cardiology. Consult Note - 9:14 PM  
I have spoken with Dr. Juan Pablo Huber, discussed patient presentation, exam and diagnostic results. Plan is to start ASA 81 and metoprolol 25 mg BID, and he will see the patient tomorrow. Plan discussed with the patient and his wife. Rx given for aspirin and metoprolol, first dose given here.  He will follow up with Dr. Juan Pablo Huber tomorrow. Strict ED return precautions discussed and provided in writing at time of discharge. The patient verbalized understanding and agreement with this plan.

## 2018-12-04 ENCOUNTER — OFFICE VISIT (OUTPATIENT)
Dept: CARDIOLOGY CLINIC | Age: 53
End: 2018-12-04

## 2018-12-04 VITALS
DIASTOLIC BLOOD PRESSURE: 72 MMHG | SYSTOLIC BLOOD PRESSURE: 116 MMHG | HEART RATE: 74 BPM | BODY MASS INDEX: 36.36 KG/M2 | RESPIRATION RATE: 16 BRPM | WEIGHT: 254 LBS | HEIGHT: 70 IN

## 2018-12-04 DIAGNOSIS — I20.0 UNSTABLE ANGINA (HCC): Primary | ICD-10-CM

## 2018-12-04 DIAGNOSIS — I20.0 UNSTABLE ANGINA PECTORIS (HCC): ICD-10-CM

## 2018-12-04 DIAGNOSIS — I10 ESSENTIAL HYPERTENSION: ICD-10-CM

## 2018-12-04 DIAGNOSIS — I25.110 CORONARY ARTERY DISEASE INVOLVING NATIVE CORONARY ARTERY OF NATIVE HEART WITH UNSTABLE ANGINA PECTORIS (HCC): Primary | ICD-10-CM

## 2018-12-04 DIAGNOSIS — I25.110 ATHEROSCLEROSIS OF NATIVE CORONARY ARTERY OF NATIVE HEART WITH UNSTABLE ANGINA PECTORIS (HCC): ICD-10-CM

## 2018-12-04 LAB
ATRIAL RATE: 74 BPM
CALCULATED P AXIS, ECG09: 55 DEGREES
CALCULATED R AXIS, ECG10: 70 DEGREES
CALCULATED T AXIS, ECG11: 58 DEGREES
DIAGNOSIS, 93000: NORMAL
P-R INTERVAL, ECG05: 118 MS
Q-T INTERVAL, ECG07: 424 MS
QRS DURATION, ECG06: 74 MS
QTC CALCULATION (BEZET), ECG08: 470 MS
VENTRICULAR RATE, ECG03: 74 BPM

## 2018-12-04 RX ORDER — NITROGLYCERIN 0.4 MG/1
0.4 TABLET SUBLINGUAL
Qty: 25 TAB | Refills: 3 | Status: SHIPPED | OUTPATIENT
Start: 2018-12-04 | End: 2021-04-01

## 2018-12-04 RX ORDER — ERGOCALCIFEROL 1.25 MG/1
CAPSULE ORAL
COMMUNITY
Start: 2018-11-14 | End: 2019-08-09

## 2018-12-04 RX ORDER — GABAPENTIN 300 MG/1
300 CAPSULE ORAL
COMMUNITY
Start: 2018-11-14 | End: 2021-04-01

## 2018-12-04 RX ORDER — LOSARTAN POTASSIUM AND HYDROCHLOROTHIAZIDE 12.5; 1 MG/1; MG/1
TABLET ORAL DAILY
COMMUNITY
Start: 2018-11-14 | End: 2018-12-04 | Stop reason: SDUPTHER

## 2018-12-04 RX ORDER — SODIUM CHLORIDE 0.9 % (FLUSH) 0.9 %
5-10 SYRINGE (ML) INJECTION AS NEEDED
Status: CANCELLED | OUTPATIENT
Start: 2018-12-05

## 2018-12-04 RX ORDER — ROSUVASTATIN CALCIUM 20 MG/1
20 TABLET, COATED ORAL
Qty: 30 TAB | Refills: 6 | Status: SHIPPED | OUTPATIENT
Start: 2018-12-04 | End: 2021-03-30

## 2018-12-04 RX ORDER — LOSARTAN POTASSIUM AND HYDROCHLOROTHIAZIDE 12.5; 1 MG/1; MG/1
0.5 TABLET ORAL DAILY
Qty: 30 TAB | Refills: 6 | Status: SHIPPED | OUTPATIENT
Start: 2018-12-04 | End: 2019-08-09

## 2018-12-04 RX ORDER — SODIUM CHLORIDE 0.9 % (FLUSH) 0.9 %
5-10 SYRINGE (ML) INJECTION EVERY 8 HOURS
Status: CANCELLED | OUTPATIENT
Start: 2018-12-05

## 2018-12-04 NOTE — DISCHARGE INSTRUCTIONS

## 2018-12-04 NOTE — H&P (VIEW-ONLY)
Sharie Romberg, MD. [facility] [place] Patient: Kwasi Moss : 1965 Today's Date: 2018 HISTORY OF PRESENT ILLNESS:  
 
History of Present Illness: 
Referred here for chest pain after ER visit yesterday. Past 2-3 weeks when he exerts himself walking in parking lot, he gets heart pounding, jaw pain, neck pain (7/10 level)  - (no major chest pain) - better with rest (some COLON). It does not take much for him to have pain. When he had his stent 4 years ago he just was having arm pain. He had just been on Hyzaar yesterday - was not taking an ASA or statin. No pain at rest.   
 
 
 
 
PAST MEDICAL HISTORY:  
 
Past Medical History:  
Diagnosis Date  CAD (coronary artery disease)  Cancer (HCC)   
 tonsil cancer  GERD (gastroesophageal reflux disease)  Hypertension  Hypothyroidism  Psychiatric disorder   
 depression  Seizures ([address]  Past Surgical History:  
Procedure Laterality Date  CARDIAC SURG PROCEDURE UNLIST    
 stent  HX HEENT    
 tonsillectomy for malignant tumor  HX OTHER SURGICAL    
 tonsil cancer (removed) MEDICATIONS:  
 
Current Outpatient Medications Medication Sig Dispense Refill  VITAMIN D2 50,000 unit capsule Take  by mouth every Monday and Thursday.  gabapentin (NEURONTIN) 300 mg capsule Take 300 mg by mouth nightly as needed.  losartan-hydroCHLOROthiazide (HYZAAR) 100-12.5 mg per tablet Take  by mouth daily.  aspirin 81 mg chewable tablet Take 1 Tab by mouth daily for 30 days. 30 Tab 0  
 metoprolol tartrate (LOPRESSOR) 25 mg tablet Take 1 Tab by mouth two (2) times a day for 30 days. 60 Tab 0  
 acetaminophen (TYLENOL EXTRA STRENGTH) 500 mg tablet Take  by mouth every six (6) hours as needed for Pain.  naproxen (NAPROSYN) 250 mg tablet Take  by mouth two (2) times daily (with meals).     
 levothyroxine (SYNTHROID) 100 mcg tablet Take 1 tablet by mouth Daily (before breakfast). (Patient taking differently: Take 150 mcg by mouth Daily (before breakfast). ) 30 tablet 2 No Known Allergies SOCIAL HISTORY:  
 
Social History Tobacco Use  Smoking status: Former Smoker Packs/day: 0.50 Years: 38.00 Pack years: 19.00 Last attempt to quit: 2017 Years since quittin.9  Smokeless tobacco: Former User Quit date: 1980 Substance Use Topics  Alcohol use: Yes Alcohol/week: 1.2 - 1.8 oz Types: 2 - 3 Cans of beer per week  Drug use: No  
 
 
 
FAMILY HISTORY:  
 
Family History Problem Relation Age of Onset  Thyroid Disease Mother  Heart Attack Father  Heart Disease Father   
     cabg  x4 vessel  Hypertension Father  Anesth Problems Neg Hx REVIEW OF SYMPTOMS:  
 
Review of Symptoms: 
Constitutional: Negative for fever, chills HEENT: Negative for nosebleeds, tinnitus, and vision changes. Respiratory: Negative for cough, wheezing Cardiovascular: Negative for orthopnea, claudication, syncope, and PND. Gastrointestinal: Negative for abdominal pain, diarrhea, melena. Genitourinary: Negative for dysuria Musculoskeletal: Negative for myalgias. Skin: Negative for rash Heme: No problems bleeding. Neurological: Negative for speech change and focal weakness. PHYSICAL EXAM:  
 
Physical Exam: 
Visit Vitals /72 (BP 1 Location: Left arm, BP Patient Position: Sitting) Pulse 74 Resp 16 Ht 5' 10\" (1.778 m) Wt 254 lb (115.2 kg) BMI 36.45 kg/m² Patient appears generally well, mood and affect are appropriate and pleasant. HEENT:  Hearing intact, non-icteric, normocephalic, atraumatic. Neck Exam: Supple, No JVD or carotid bruits. Lung Exam: Clear to auscultation, even breath sounds. Cardiac Exam: Regular rate and rhythm with no murmur Abdomen: Soft, non-tender, normal bowel sounds. No bruits or masses. Extremities: Moves all ext well. No lower extremity edema. Vascular: 2+ dorsalis pedis pulses bilaterally. Psych: Appropriate affect Neuro - Grossly intact LABS / OTHER STUDIES:  
 
Recent Results (from the past 24 hour(s)) CBC WITH AUTOMATED DIFF Collection Time: 12/03/18  5:49 PM  
Result Value Ref Range WBC 4.2 4.1 - 11.1 K/uL  
 RBC 4.12 4.10 - 5.70 M/uL  
 HGB 12.8 12.1 - 17.0 g/dL HCT 36.1 (L) 36.6 - 50.3 % MCV 87.6 80.0 - 99.0 FL  
 MCH 31.1 26.0 - 34.0 PG  
 MCHC 35.5 30.0 - 36.5 g/dL  
 RDW 12.4 11.5 - 14.5 % PLATELET 684 858 - 824 K/uL MPV 9.4 8.9 - 12.9 FL  
 NRBC 0.0 0  WBC ABSOLUTE NRBC 0.00 0.00 - 0.01 K/uL NEUTROPHILS 59 32 - 75 % LYMPHOCYTES 25 12 - 49 % MONOCYTES 15 (H) 5 - 13 % EOSINOPHILS 1 0 - 7 % BASOPHILS 0 0 - 1 % IMMATURE GRANULOCYTES 0 0.0 - 0.5 % ABS. NEUTROPHILS 2.5 1.8 - 8.0 K/UL  
 ABS. LYMPHOCYTES 1.1 0.8 - 3.5 K/UL  
 ABS. MONOCYTES 0.6 0.0 - 1.0 K/UL  
 ABS. EOSINOPHILS 0.1 0.0 - 0.4 K/UL  
 ABS. BASOPHILS 0.0 0.0 - 0.1 K/UL  
 ABS. IMM. GRANS. 0.0 0.00 - 0.04 K/UL  
 DF AUTOMATED METABOLIC PANEL, COMPREHENSIVE Collection Time: 12/03/18  5:49 PM  
Result Value Ref Range Sodium 140 136 - 145 mmol/L Potassium 3.7 3.5 - 5.1 mmol/L Chloride 103 97 - 108 mmol/L  
 CO2 27 21 - 32 mmol/L Anion gap 10 5 - 15 mmol/L Glucose 111 (H) 65 - 100 mg/dL BUN 21 (H) 6 - 20 MG/DL Creatinine 1.06 0.70 - 1.30 MG/DL  
 BUN/Creatinine ratio 20 12 - 20 GFR est AA >60 >60 ml/min/1.73m2 GFR est non-AA >60 >60 ml/min/1.73m2 Calcium 9.1 8.5 - 10.1 MG/DL Bilirubin, total 0.3 0.2 - 1.0 MG/DL  
 ALT (SGPT) 58 12 - 78 U/L  
 AST (SGOT) 22 15 - 37 U/L Alk. phosphatase 47 45 - 117 U/L Protein, total 7.6 6.4 - 8.2 g/dL Albumin 4.1 3.5 - 5.0 g/dL Globulin 3.5 2.0 - 4.0 g/dL A-G Ratio 1.2 1.1 - 2.2    
TROPONIN I Collection Time: 12/03/18  5:49 PM  
Result Value Ref Range Troponin-I, Qt. <0.05 <0.05 ng/mL EKG, 12 LEAD, INITIAL Collection Time: 12/03/18  5:58 PM  
Result Value Ref Range Ventricular Rate 74 BPM  
 Atrial Rate 74 BPM  
 P-R Interval 118 ms QRS Duration 74 ms Q-T Interval 424 ms QTC Calculation (Bezet) 470 ms Calculated P Axis 55 degrees Calculated R Axis 70 degrees Calculated T Axis 58 degrees Diagnosis Normal sinus rhythm T wave abnormality, consider anterior ischemia Abnormal ECG When compared with ECG of 30-SEP-2014 10:31, No significant change Confirmed by Phillip Chun M.D., [employer] [facility] ([id number]) on 12/4/2018 10:50:00 AM 
  
SAMPLES BEING HELD Collection Time: 12/03/18  7:25 PM  
Result Value Ref Range SAMPLES BEING HELD 1BLU   
 COMMENT Add-on orders for these samples will be processed based on acceptable specimen integrity and analyte stability, which may vary by analyte. TROPONIN I Collection Time: 12/03/18  8:43 PM  
Result Value Ref Range Troponin-I, Qt. <0.05 <0.05 ng/mL Lab Results Component Value Date/Time Cholesterol, total 205 (H) 09/30/2014 03:48 AM  
 HDL Cholesterol 31 09/30/2014 03:48 AM  
 LDL,Direct 148 (H) 09/30/2014 03:48 AM  
 LDL, calculated Not calculated due to elevated triglyceride level 09/30/2014 03:48 AM  
 VLDL, calculated  09/30/2014 03:48 AM  
  Calculation not valid with this patient's other Lipid values. Triglyceride 406 (H) 09/30/2014 03:48 AM  
 CHOL/HDL Ratio 6.6 (H) 09/30/2014 03:48 AM  
 
 
 
 
CARDIAC DIAGNOSTICS:  
 
Cardiac Evaluation Includes: 
 
s/p cath on 9/30/14 with 80% prox/mid LAD lesion EKG 12/3/18 - NSR, mild anterior TWI - similar to 2017 EKG ASSESSMENT AND PLAN:  
 
Assessment and Plan: 
1) Unstable angina - s/p cath on 9/30/14 with 80% prox/mid LAD lesion  
- He went to ER on 12/3/18 with unstable angina with exertional chest pain (class III angina) - Given unstable angina, will proceed with an urgent cardiac cath. Reviewed risks (bleeding, MI, CVA, ARF, death, etc) and benefits and alternatives (meds and stress test) and he agrees to proceed with a cath. - Added ASA 81 mg and metoprolol 12/4/18 
- Add SL NTG PRN 
- Add a statin - plan for urgent cath for unstable angina 2) HTN 
- Will cut back Hyzaar in half due to mild orthostatic complaints and the fact we are adding metoprolol 3) Dyslipidemia 
- he was on pravachol, but stopped himself in 2017 due to muscle pain - Will add crestor 20 mg daily 4) Cardiac cath tomorrow. See me back in 2-3 weeks. 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 Grew up in [place] (moved here in 2012). Crystal Linares MD, [facility] - [address], [address]  [address] [place] [address] [address] Ph: [phone]   Ph [phone]

## 2018-12-04 NOTE — PROGRESS NOTES
Spoke with pt concerning [facility] procedure. (Pt [id number]). Instructions given to pt per VO of Dr. Dhara May. Pt NPO after midnight; hold nothing and take all other meds with sip of water in AM; have someone available to drive pt to and from procedure; pack a bag in case an overnight stay is warranted. [facility] scheduled for 0845 on 12/5/18. Pt advised to arrive 2hrs prior to procedure for prep. Labs completed 12/3/18. Pt expressed understanding. Opportunities for questions, clarifications, and concerns provided.

## 2018-12-04 NOTE — PROGRESS NOTES
Annika Chapa MD. [facility] [place]              Patient: Maria Guadalupe August  : 1965      Today's Date: 2018            HISTORY OF PRESENT ILLNESS:     History of Present Illness:  Referred here for chest pain after ER visit yesterday. Past 2-3 weeks when he exerts himself walking in parking lot, he gets heart pounding, jaw pain, neck pain (7/10 level)  - (no major chest pain) - better with rest (some COLON). It does not take much for him to have pain. When he had his stent 4 years ago he just was having arm pain. He had just been on Hyzaar yesterday - was not taking an ASA or statin. No pain at rest.            PAST MEDICAL HISTORY:     Past Medical History:   Diagnosis Date    CAD (coronary artery disease)     Cancer ([facility] [address]     tonsil cancer    GERD (gastroesophageal reflux disease)     Hypertension     Hypothyroidism     Psychiatric disorder     depression    Seizures ([facility] [address]        Past Surgical History:   Procedure Laterality Date    CARDIAC SURG PROCEDURE UNLIST      stent    HX HEENT      tonsillectomy for malignant tumor    HX OTHER SURGICAL      tonsil cancer (removed)         MEDICATIONS:     Current Outpatient Medications   Medication Sig Dispense Refill    VITAMIN D2 50,000 unit capsule Take  by mouth every Monday and Thursday.  gabapentin (NEURONTIN) 300 mg capsule Take 300 mg by mouth nightly as needed.  losartan-hydroCHLOROthiazide (HYZAAR) 100-12.5 mg per tablet Take  by mouth daily.  aspirin 81 mg chewable tablet Take 1 Tab by mouth daily for 30 days. 30 Tab 0    metoprolol tartrate (LOPRESSOR) 25 mg tablet Take 1 Tab by mouth two (2) times a day for 30 days. 60 Tab 0    acetaminophen (TYLENOL EXTRA STRENGTH) 500 mg tablet Take  by mouth every six (6) hours as needed for Pain.  naproxen (NAPROSYN) 250 mg tablet Take  by mouth two (2) times daily (with meals).       levothyroxine (SYNTHROID) 100 mcg tablet Take 1 tablet by mouth Daily (before breakfast). (Patient taking differently: Take 150 mcg by mouth Daily (before breakfast). ) 30 tablet 2         No Known Allergies          SOCIAL HISTORY:     Social History     Tobacco Use    Smoking status: Former Smoker     Packs/day: 0.50     Years: 38.00     Pack years: 19.00     Last attempt to quit: 2017     Years since quittin.9    Smokeless tobacco: Former User     Quit date: 1980   Substance Use Topics    Alcohol use: Yes     Alcohol/week: 1.2 - 1.8 oz     Types: 2 - 3 Cans of beer per week    Drug use: No         FAMILY HISTORY:     Family History   Problem Relation Age of Onset    Thyroid Disease Mother     Heart Attack Father     Heart Disease Father         cabg  x4 vessel    Hypertension Father     Anesth Problems Neg Hx              REVIEW OF SYMPTOMS:     Review of Symptoms:  Constitutional: Negative for fever, chills  HEENT: Negative for nosebleeds, tinnitus, and vision changes. Respiratory: Negative for cough, wheezing  Cardiovascular: Negative for orthopnea, claudication, syncope, and PND. Gastrointestinal: Negative for abdominal pain, diarrhea, melena. Genitourinary: Negative for dysuria  Musculoskeletal: Negative for myalgias. Skin: Negative for rash  Heme: No problems bleeding. Neurological: Negative for speech change and focal weakness. PHYSICAL EXAM:     Physical Exam:  Visit Vitals  /72 (BP 1 Location: Left arm, BP Patient Position: Sitting)   Pulse 74   Resp 16   Ht 5' 10\" (1.778 m)   Wt 254 lb (115.2 kg)   BMI 36.45 kg/m²     Patient appears generally well, mood and affect are appropriate and pleasant. HEENT:  Hearing intact, non-icteric, normocephalic, atraumatic. Neck Exam: Supple, No JVD or carotid bruits. Lung Exam: Clear to auscultation, even breath sounds. Cardiac Exam: Regular rate and rhythm with no murmur  Abdomen: Soft, non-tender, normal bowel sounds. No bruits or masses. Extremities: Moves all ext well.  No lower extremity edema. Vascular: 2+ dorsalis pedis pulses bilaterally. Psych: Appropriate affect  Neuro - Grossly intact        LABS / OTHER STUDIES:     Recent Results (from the past 24 hour(s))   CBC WITH AUTOMATED DIFF    Collection Time: 12/03/18  5:49 PM   Result Value Ref Range    WBC 4.2 4.1 - 11.1 K/uL    RBC 4.12 4.10 - 5.70 M/uL    HGB 12.8 12.1 - 17.0 g/dL    HCT 36.1 (L) 36.6 - 50.3 %    MCV 87.6 80.0 - 99.0 FL    MCH 31.1 26.0 - 34.0 PG    MCHC 35.5 30.0 - 36.5 g/dL    RDW 12.4 11.5 - 14.5 %    PLATELET 042 117 - 637 K/uL    MPV 9.4 8.9 - 12.9 FL    NRBC 0.0 0  WBC    ABSOLUTE NRBC 0.00 0.00 - 0.01 K/uL    NEUTROPHILS 59 32 - 75 %    LYMPHOCYTES 25 12 - 49 %    MONOCYTES 15 (H) 5 - 13 %    EOSINOPHILS 1 0 - 7 %    BASOPHILS 0 0 - 1 %    IMMATURE GRANULOCYTES 0 0.0 - 0.5 %    ABS. NEUTROPHILS 2.5 1.8 - 8.0 K/UL    ABS. LYMPHOCYTES 1.1 0.8 - 3.5 K/UL    ABS. MONOCYTES 0.6 0.0 - 1.0 K/UL    ABS. EOSINOPHILS 0.1 0.0 - 0.4 K/UL    ABS. BASOPHILS 0.0 0.0 - 0.1 K/UL    ABS. IMM. GRANS. 0.0 0.00 - 0.04 K/UL    DF AUTOMATED     METABOLIC PANEL, COMPREHENSIVE    Collection Time: 12/03/18  5:49 PM   Result Value Ref Range    Sodium 140 136 - 145 mmol/L    Potassium 3.7 3.5 - 5.1 mmol/L    Chloride 103 97 - 108 mmol/L    CO2 27 21 - 32 mmol/L    Anion gap 10 5 - 15 mmol/L    Glucose 111 (H) 65 - 100 mg/dL    BUN 21 (H) 6 - 20 MG/DL    Creatinine 1.06 0.70 - 1.30 MG/DL    BUN/Creatinine ratio 20 12 - 20      GFR est AA >60 >60 ml/min/1.73m2    GFR est non-AA >60 >60 ml/min/1.73m2    Calcium 9.1 8.5 - 10.1 MG/DL    Bilirubin, total 0.3 0.2 - 1.0 MG/DL    ALT (SGPT) 58 12 - 78 U/L    AST (SGOT) 22 15 - 37 U/L    Alk.  phosphatase 47 45 - 117 U/L    Protein, total 7.6 6.4 - 8.2 g/dL    Albumin 4.1 3.5 - 5.0 g/dL    Globulin 3.5 2.0 - 4.0 g/dL    A-G Ratio 1.2 1.1 - 2.2     TROPONIN I    Collection Time: 12/03/18  5:49 PM   Result Value Ref Range    Troponin-I, Qt. <0.05 <0.05 ng/mL   EKG, 12 LEAD, INITIAL    Collection Time: 12/03/18  5:58 PM   Result Value Ref Range    Ventricular Rate 74 BPM    Atrial Rate 74 BPM    P-R Interval 118 ms    QRS Duration 74 ms    Q-T Interval 424 ms    QTC Calculation (Bezet) 470 ms    Calculated P Axis 55 degrees    Calculated R Axis 70 degrees    Calculated T Axis 58 degrees    Diagnosis       Normal sinus rhythm  T wave abnormality, consider anterior ischemia  Abnormal ECG  When compared with ECG of 30-SEP-2014 10:31,  No significant change  Confirmed by Rachel Suarez M.D., [address] [place] [address] [id number]) on 12/4/2018 10:50:00 AM     SAMPLES BEING HELD    Collection Time: 12/03/18  7:25 PM   Result Value Ref Range    SAMPLES BEING HELD 1BLU     COMMENT        Add-on orders for these samples will be processed based on acceptable specimen integrity and analyte stability, which may vary by analyte. TROPONIN I    Collection Time: 12/03/18  8:43 PM   Result Value Ref Range    Troponin-I, Qt. <0.05 <0.05 ng/mL       Lab Results   Component Value Date/Time    Cholesterol, total 205 (H) 09/30/2014 03:48 AM    HDL Cholesterol 31 09/30/2014 03:48 AM    LDL,Direct 148 (H) 09/30/2014 03:48 AM    LDL, calculated Not calculated due to elevated triglyceride level 09/30/2014 03:48 AM    VLDL, calculated  09/30/2014 03:48 AM     Calculation not valid with this patient's other Lipid values. Triglyceride 406 (H) 09/30/2014 03:48 AM    CHOL/HDL Ratio 6.6 (H) 09/30/2014 03:48 AM           CARDIAC DIAGNOSTICS:     Cardiac Evaluation Includes:    s/p cath on 9/30/14 with 80% prox/mid LAD lesion     EKG 12/3/18 - NSR, mild anterior TWI - similar to 2017 EKG       ASSESSMENT AND PLAN:     Assessment and Plan:  1) Unstable angina  - s/p cath on 9/30/14 with 80% prox/mid LAD lesion   - He went to ER on 12/3/18 with unstable angina with exertional chest pain (class III angina)   - Given unstable angina, will proceed with an urgent cardiac cath.   Reviewed risks (bleeding, MI, CVA, ARF, death, etc) and benefits and alternatives (meds and stress test) and he agrees to proceed with a cath. - Added ASA 81 mg and metoprolol 12/4/18  - Add SL NTG PRN  - Add a statin  - plan for urgent cath for unstable angina     2) HTN  - Will cut back Hyzaar in half due to mild orthostatic complaints and the fact we are adding metoprolol    3) Dyslipidemia  - he was on pravachol, but stopped himself in 2017 due to muscle pain   - Will add crestor 20 mg daily     4) Cardiac cath tomorrow. See me back in 2-3 weeks. 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 Grew up in [place] (moved here in 2012). Prakash West MD, Janny [address] [place] [address] Drive.  [address], [facility] N. Messi Caldwell.  Narinder, [address] [place] [address]  Ph: [phone]   Ph [phone]

## 2018-12-05 ENCOUNTER — HOSPITAL ENCOUNTER (OUTPATIENT)
Dept: CARDIAC CATH/INVASIVE PROCEDURES | Age: 53
Setting detail: OBSERVATION
Discharge: HOME | End: 2018-12-06
Attending: INTERNAL MEDICINE | Admitting: INTERNAL MEDICINE
Payer: COMMERCIAL

## 2018-12-05 DIAGNOSIS — I20.0 UNSTABLE ANGINA (HCC): ICD-10-CM

## 2018-12-05 DIAGNOSIS — I25.110 ATHEROSCLEROSIS OF NATIVE CORONARY ARTERY OF NATIVE HEART WITH UNSTABLE ANGINA PECTORIS (HCC): ICD-10-CM

## 2018-12-05 PROBLEM — I25.10 CAD (CORONARY ARTERY DISEASE): Status: ACTIVE | Noted: 2018-12-05

## 2018-12-05 PROBLEM — Z98.61 POST PTCA: Status: ACTIVE | Noted: 2018-12-05

## 2018-12-05 LAB
ACT BLD: 373 SECS (ref 79–138)
ATRIAL RATE: 64 BPM
CALCULATED P AXIS, ECG09: 61 DEGREES
CALCULATED R AXIS, ECG10: 70 DEGREES
CALCULATED T AXIS, ECG11: 90 DEGREES
DIAGNOSIS, 93000: NORMAL
P-R INTERVAL, ECG05: 126 MS
Q-T INTERVAL, ECG07: 428 MS
QRS DURATION, ECG06: 78 MS
QTC CALCULATION (BEZET), ECG08: 441 MS
VENTRICULAR RATE, ECG03: 64 BPM

## 2018-12-05 PROCEDURE — 77030008543 HC TBNG MON PRSS MRTM -A

## 2018-12-05 PROCEDURE — C1769 GUIDE WIRE: HCPCS

## 2018-12-05 PROCEDURE — 77030013797 HC KT TRNSDUC PRSSR EDWD -A

## 2018-12-05 PROCEDURE — 74011250637 HC RX REV CODE- 250/637: Performed by: INTERNAL MEDICINE

## 2018-12-05 PROCEDURE — 74011636320 HC RX REV CODE- 636/320

## 2018-12-05 PROCEDURE — 99218 HC RM OBSERVATION: CPT

## 2018-12-05 PROCEDURE — 92929 HC PLC DE STNT +/-PTA MAJOR COR VESL/BRNCH  ADD RC: CPT

## 2018-12-05 PROCEDURE — 77030004532 HC CATH ANGI DX IMP BSC -A

## 2018-12-05 PROCEDURE — C1894 INTRO/SHEATH, NON-LASER: HCPCS

## 2018-12-05 PROCEDURE — 74011250636 HC RX REV CODE- 250/636: Performed by: INTERNAL MEDICINE

## 2018-12-05 PROCEDURE — 93005 ELECTROCARDIOGRAM TRACING: CPT

## 2018-12-05 PROCEDURE — C1887 CATHETER, GUIDING: HCPCS

## 2018-12-05 PROCEDURE — C1725 CATH, TRANSLUMIN NON-LASER: HCPCS

## 2018-12-05 PROCEDURE — 74011000250 HC RX REV CODE- 250: Performed by: INTERNAL MEDICINE

## 2018-12-05 PROCEDURE — 77030018836 HC SOL IRR NACL ICUM -A

## 2018-12-05 PROCEDURE — 77030013519 HC DEV INFL BASIX MRTM -B

## 2018-12-05 PROCEDURE — 74011000258 HC RX REV CODE- 258: Performed by: INTERNAL MEDICINE

## 2018-12-05 PROCEDURE — C1874 STENT, COATED/COV W/DEL SYS: HCPCS

## 2018-12-05 PROCEDURE — 92929 HC PLC DE STNT +/-PTA MAJOR COR VESL/BRNCH  ADD RC: CPT | Performed by: INTERNAL MEDICINE

## 2018-12-05 PROCEDURE — 77030019569 HC BND COMPR RAD TERU -B

## 2018-12-05 PROCEDURE — 93458 L HRT ARTERY/VENTRICLE ANGIO: CPT

## 2018-12-05 PROCEDURE — 74011636320 HC RX REV CODE- 636/320: Performed by: INTERNAL MEDICINE

## 2018-12-05 PROCEDURE — 77030029065 HC DRSG HEMO QCLOT ZMED -B

## 2018-12-05 PROCEDURE — 85347 COAGULATION TIME ACTIVATED: CPT

## 2018-12-05 RX ORDER — SODIUM CHLORIDE 0.9 % (FLUSH) 0.9 %
5-10 SYRINGE (ML) INJECTION AS NEEDED
Status: DISCONTINUED | OUTPATIENT
Start: 2018-12-05 | End: 2018-12-05 | Stop reason: HOSPADM

## 2018-12-05 RX ORDER — SODIUM CHLORIDE 0.9 % (FLUSH) 0.9 %
5-10 SYRINGE (ML) INJECTION EVERY 8 HOURS
Status: DISCONTINUED | OUTPATIENT
Start: 2018-12-05 | End: 2018-12-06 | Stop reason: HOSPADM

## 2018-12-05 RX ORDER — ACETAMINOPHEN 325 MG/1
650 TABLET ORAL
Status: DISCONTINUED | OUTPATIENT
Start: 2018-12-05 | End: 2018-12-06 | Stop reason: HOSPADM

## 2018-12-05 RX ORDER — SODIUM CHLORIDE 9 MG/ML
75 INJECTION, SOLUTION INTRAVENOUS CONTINUOUS
Status: DISPENSED | OUTPATIENT
Start: 2018-12-05 | End: 2018-12-05

## 2018-12-05 RX ORDER — ASPIRIN 325 MG
325 TABLET ORAL
Status: COMPLETED | OUTPATIENT
Start: 2018-12-05 | End: 2018-12-05

## 2018-12-05 RX ORDER — HEPARIN SODIUM 1000 [USP'U]/ML
3000 INJECTION, SOLUTION INTRAVENOUS; SUBCUTANEOUS ONCE
Status: COMPLETED | OUTPATIENT
Start: 2018-12-05 | End: 2018-12-05

## 2018-12-05 RX ORDER — HEPARIN SODIUM 200 [USP'U]/100ML
500 INJECTION, SOLUTION INTRAVENOUS ONCE
Status: COMPLETED | OUTPATIENT
Start: 2018-12-05 | End: 2018-12-05

## 2018-12-05 RX ORDER — METOPROLOL TARTRATE 25 MG/1
25 TABLET, FILM COATED ORAL 2 TIMES DAILY
Status: DISCONTINUED | OUTPATIENT
Start: 2018-12-05 | End: 2018-12-06 | Stop reason: HOSPADM

## 2018-12-05 RX ORDER — LEVOTHYROXINE SODIUM 150 UG/1
150 TABLET ORAL
Status: DISCONTINUED | OUTPATIENT
Start: 2018-12-06 | End: 2018-12-06 | Stop reason: HOSPADM

## 2018-12-05 RX ORDER — GUAIFENESIN 100 MG/5ML
81 LIQUID (ML) ORAL DAILY
Status: DISCONTINUED | OUTPATIENT
Start: 2018-12-06 | End: 2018-12-06 | Stop reason: HOSPADM

## 2018-12-05 RX ORDER — LIDOCAINE HYDROCHLORIDE 10 MG/ML
10-20 INJECTION INFILTRATION; PERINEURAL
Status: DISCONTINUED | OUTPATIENT
Start: 2018-12-05 | End: 2018-12-05 | Stop reason: HOSPADM

## 2018-12-05 RX ORDER — VERAPAMIL HYDROCHLORIDE 2.5 MG/ML
2.5 INJECTION, SOLUTION INTRAVENOUS
Status: DISCONTINUED | OUTPATIENT
Start: 2018-12-05 | End: 2018-12-05 | Stop reason: HOSPADM

## 2018-12-05 RX ORDER — ISOSORBIDE MONONITRATE 30 MG/1
30 TABLET, EXTENDED RELEASE ORAL DAILY
Status: DISCONTINUED | OUTPATIENT
Start: 2018-12-05 | End: 2018-12-06 | Stop reason: HOSPADM

## 2018-12-05 RX ORDER — SODIUM CHLORIDE 0.9 % (FLUSH) 0.9 %
5-10 SYRINGE (ML) INJECTION AS NEEDED
Status: DISCONTINUED | OUTPATIENT
Start: 2018-12-05 | End: 2018-12-06 | Stop reason: HOSPADM

## 2018-12-05 RX ORDER — SODIUM CHLORIDE 0.9 % (FLUSH) 0.9 %
5-10 SYRINGE (ML) INJECTION EVERY 8 HOURS
Status: DISCONTINUED | OUTPATIENT
Start: 2018-12-05 | End: 2018-12-05 | Stop reason: HOSPADM

## 2018-12-05 RX ORDER — ROSUVASTATIN CALCIUM 10 MG/1
20 TABLET, COATED ORAL
Status: DISCONTINUED | OUTPATIENT
Start: 2018-12-05 | End: 2018-12-06 | Stop reason: HOSPADM

## 2018-12-05 RX ORDER — MIDAZOLAM HYDROCHLORIDE 1 MG/ML
.5-5 INJECTION, SOLUTION INTRAMUSCULAR; INTRAVENOUS
Status: DISCONTINUED | OUTPATIENT
Start: 2018-12-05 | End: 2018-12-05 | Stop reason: HOSPADM

## 2018-12-05 RX ORDER — FENTANYL CITRATE 50 UG/ML
25-100 INJECTION, SOLUTION INTRAMUSCULAR; INTRAVENOUS
Status: DISCONTINUED | OUTPATIENT
Start: 2018-12-05 | End: 2018-12-05 | Stop reason: HOSPADM

## 2018-12-05 RX ADMIN — HEPARIN SODIUM 1000 UNITS: 200 INJECTION, SOLUTION INTRAVENOUS at 09:21

## 2018-12-05 RX ADMIN — IOPAMIDOL 225 ML: 755 INJECTION, SOLUTION INTRAVENOUS at 10:45

## 2018-12-05 RX ADMIN — NITROGLYCERIN 100 MCG: 5 INJECTION, SOLUTION INTRAVENOUS at 09:28

## 2018-12-05 RX ADMIN — NITROGLYCERIN 200 MCG: 5 INJECTION, SOLUTION INTRAVENOUS at 09:16

## 2018-12-05 RX ADMIN — IOPAMIDOL 50 ML: 755 INJECTION, SOLUTION INTRAVENOUS at 10:46

## 2018-12-05 RX ADMIN — HEPARIN SODIUM 3000 UNITS: 1000 INJECTION INTRAVENOUS; SUBCUTANEOUS at 09:20

## 2018-12-05 RX ADMIN — FENTANYL CITRATE 25 MCG: 50 INJECTION, SOLUTION INTRAMUSCULAR; INTRAVENOUS at 09:16

## 2018-12-05 RX ADMIN — SODIUM CHLORIDE 75 ML/HR: 900 INJECTION, SOLUTION INTRAVENOUS at 12:44

## 2018-12-05 RX ADMIN — ROSUVASTATIN CALCIUM 20 MG: 10 TABLET, FILM COATED ORAL at 21:30

## 2018-12-05 RX ADMIN — MIDAZOLAM 1 MG: 1 INJECTION INTRAMUSCULAR; INTRAVENOUS at 09:48

## 2018-12-05 RX ADMIN — FENTANYL CITRATE 25 MCG: 50 INJECTION, SOLUTION INTRAMUSCULAR; INTRAVENOUS at 09:27

## 2018-12-05 RX ADMIN — ACETAMINOPHEN 650 MG: 325 TABLET, FILM COATED ORAL at 14:55

## 2018-12-05 RX ADMIN — TICAGRELOR 180 MG: 90 TABLET ORAL at 10:38

## 2018-12-05 RX ADMIN — LIDOCAINE HYDROCHLORIDE 10 ML: 10 INJECTION, SOLUTION INFILTRATION; PERINEURAL at 09:14

## 2018-12-05 RX ADMIN — ASPIRIN 325 MG: 325 TABLET ORAL at 10:47

## 2018-12-05 RX ADMIN — BIVALIRUDIN 1.75 MG/KG/HR: 250 INJECTION, POWDER, LYOPHILIZED, FOR SOLUTION INTRAVENOUS at 09:58

## 2018-12-05 RX ADMIN — MIDAZOLAM 1 MG: 1 INJECTION INTRAMUSCULAR; INTRAVENOUS at 09:11

## 2018-12-05 RX ADMIN — NITROGLYCERIN 100 MCG: 5 INJECTION, SOLUTION INTRAVENOUS at 10:23

## 2018-12-05 RX ADMIN — BIVALIRUDIN 1.75 MG/KG/HR: 250 INJECTION, POWDER, LYOPHILIZED, FOR SOLUTION INTRAVENOUS at 10:03

## 2018-12-05 RX ADMIN — MIDAZOLAM 1 MG: 1 INJECTION INTRAMUSCULAR; INTRAVENOUS at 09:27

## 2018-12-05 RX ADMIN — FENTANYL CITRATE 25 MCG: 50 INJECTION, SOLUTION INTRAMUSCULAR; INTRAVENOUS at 09:11

## 2018-12-05 RX ADMIN — VERAPAMIL HYDROCHLORIDE 2.5 MG: 2.5 INJECTION INTRAVENOUS at 09:16

## 2018-12-05 RX ADMIN — Medication 10 ML: at 13:47

## 2018-12-05 RX ADMIN — ISOSORBIDE MONONITRATE 30 MG: 30 TABLET, EXTENDED RELEASE ORAL at 13:58

## 2018-12-05 RX ADMIN — Medication 10 ML: at 22:41

## 2018-12-05 RX ADMIN — METOPROLOL TARTRATE 25 MG: 25 TABLET ORAL at 18:22

## 2018-12-05 RX ADMIN — TICAGRELOR 90 MG: 90 TABLET ORAL at 22:41

## 2018-12-05 RX ADMIN — MIDAZOLAM 1 MG: 1 INJECTION INTRAMUSCULAR; INTRAVENOUS at 09:16

## 2018-12-05 RX ADMIN — FENTANYL CITRATE 25 MCG: 50 INJECTION, SOLUTION INTRAMUSCULAR; INTRAVENOUS at 09:48

## 2018-12-05 NOTE — PROCEDURES
BRIEF PROCEDURE NOTE    Date of Procedure: 12/5/2018   Preoperative Diagnosis: UA/ACS  Postoperative Diagnosis: Sig 2 V dz; PCI to RCA with ANNA; patent stent LAD    Procedure: Left heart cath, LV angiography, coronary angiography  Interventional Cardiologist: Cherry Michelle MD  Anesthesia: local + IV sedation  Estimated Blood Loss: Minimal  Findings:     R radial access; Diagnostic cath completed /Pt had significant spasm/pain with diagnostic catheter manipulation;  R CFA access - micropuncture needle /PCI performed via RCFA  Diagnostic - LCA - Glover L back up                       RCA - JR 4    L Main: Short/med; Nml    LAD: Medium;  Prox stent patent; Mid /Distal 40% lesion    LCflex: Medium size; Ostial/Prox 20%; OM1 - 60%( tandem lesions)    RI -  Small to med; Ostial 60-70%; Mid tandem 70% lesions    RCA: Dominant ; Small to Med; Prox  90%; Mid 80%; PDA and PLB - small  - MLI    LVEDP: 13 mm Hg    LVEF:Not assessed    No significant gradient across aortic valve. PCI: Difficulty engaging with JR 4 guide    3 DRC guide with SH used  BMW wire  2.25 by 15 and 2.25 by 22 stents deployed in overlapping fashion and overlap dilated with stent balloon. SONJA 3 before and after  90% before and 0% after          Specimens Removed : None    Closure Device: Manual        See full cath note.     Complications: none    Cherry Michelle MD

## 2018-12-05 NOTE — PROGRESS NOTES
0800  Patient arrived. ID and allergies verified verbally with patient. Pt voices understanding of procedure to be performed. Consent obtained. Pt prepped for procedure. Patient and family oriented to fall prevention protocol. Understanding verbalized. Yellow band and socks applied      8:43 AM  TRANSFER - OUT REPORT:    Verbal report given to Nancy(name) on [employer]  being transferred to cath lab(unit) for ordered procedure       Report consisted of patients Situation, Background, Assessment and   Recommendations(SBAR). Information from the following report(s) SBAR was reviewed with the receiving nurse. Lines:   Peripheral IV 12/05/18 Anterior; Inferior;Left;Lower;Proximal Arm (Active)        Opportunity for questions and clarification was provided. Patient transported with:   Registered Nurse      10:45 AM  TRANSFER - IN REPORT:    Verbal report received from [employer]) on [employer]  being received from cath lab(unit) for routine post - op      Report consisted of patients Situation, Background, Assessment and   Recommendations(SBAR). Information from the following report(s) SBAR, Procedure Summary and MAR was reviewed with the receiving nurse. Opportunity for questions and clarification was provided. Assessment completed upon patients arrival to unit and care assumed. Pt voices understanding of post procedure bedrest instructions. 12:14 PM  TRANSFER - OUT REPORT:    Verbal report given to Alissa(name) on [employer]  being transferred to IVCU(unit) for routine post - op       Report consisted of patients Situation, Background, Assessment and   Recommendations(SBAR). Information from the following report(s) SBAR, Procedure Summary and MAR was reviewed with the receiving nurse. Lines:   Peripheral IV 12/05/18 Anterior; Inferior;Left;Lower;Proximal Arm (Active)        Opportunity for questions and clarification was provided.       Patient transported with:   Monitor  Registered Nurse

## 2018-12-05 NOTE — PROGRESS NOTES
1208: TRANSFER - IN REPORT:    Verbal report received from Lana Greenwood RN(name) on Onetha Brochure  being received from Cath lab(unit) for routine progression of care      Report consisted of patients Situation, Background, Assessment and   Recommendations(SBAR). Information from the following report(s) SBAR and Procedure Summary was reviewed with the receiving nurse. Opportunity for questions and clarification was provided. Assessment completed upon patients arrival to unit and care assumed. 1233: Patient arrives to unit from cath lab. Right radial cath site clean dry intact, old dried blood noted. TR band inflated to 11cc air per cath lab RB rosmery. Right groin sheath site clean dry intact. See post procedure flowsheet for assessments. Patient a/o x 4, NSR on monitor, RA, denies pain at this time. Instructed patient to remain flat in bed, not to use right arm and not to move right leg, patient verbalizes understanding. 1332: Right groin sheath removed without difficulty, see post procedure flowsheet for assessments. Patient tolerated procedure without complications. No S/S bleeding at this time. Will monitor. 1400: Beginning TR band deflation see flowsheet for assessments. Patient tolerating without difficulty. VSS. Denies pain. Call bell in reach. 1430: Patient remains flat in bed, no s/s distress. Right radial and Right groin cath sites clean dry intact. 1630: Re assessment complete see flowsheet. Patient without complaints, VSS, cath sites clean dry intact. No s/s bleeding. Call bell in reach. 1900: Bedside shift change report given to Chandan Lopez RN (oncoming nurse) by Bj Matthews RN (offgoing nurse). Report included the following information SBAR, Procedure Summary, Intake/Output, MAR, Accordion, Recent Results, Med Rec Status and Cardiac Rhythm NSR.

## 2018-12-05 NOTE — INTERVAL H&P NOTE
H&P Update:  Margy Reddy was seen and examined. History and physical has been reviewed. The patient has been examined. There have been no significant clinical changes since the completion of the originally dated History and Physical.    Signed By: Jane Rivera MD     December 5, 2018 8:28 AM        [facility] +/- PCI/RHC consent    The risks (including but not limited to death, myocardial infarction, cerebrovascular accident, dysrhythmia, renal failure +/-dialysis, vascular complication, allergy, and/or need for emergency surgery), indications, benefits, and alternatives of cardiac catheterization +/- PCI have been explained in detail to the patient and family. Patient and family informed that if patient needs surgery  - it will be at [facility] as [facility] does not have onsite surgery. All questions answered to patient's satisfaction. Patient agrees to proceed with the procedure and  informed consent was obtained. Patient evaluated and is a BMS/ANNA candidate.     Octavio's R - normal    ASA 3     AW 2    Colt Min MD., [facility]

## 2018-12-05 NOTE — PROGRESS NOTES
Reason for Admission:   Unstable angina                   RRAT Score:    5                 Plan for utilizing home health: At this time,there are no identified home health  Needs or hospital to home needs identified. Likelihood of Readmission:  Low/green                         Transition of Care Plan:                    I met with pt as an introductory visit to discuss discharge planning. 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 I contacted pt.'s pharmacy who informed me pt has zero dollar co-pay. I gave pt the coupon in case that changes so he will have a 5 % co-pay so either way it is affordable.   Pt has a follow-up appointment with Dr Cherri Tran on 12/21/18 @ 9 am.  Miguel A Benoit

## 2018-12-05 NOTE — PROGRESS NOTES
[facility]: MED RECONCILIATION      Information obtained from: Patient, RxQuery      Allergies: Patient has no known allergies. Prior to Admission Medications:     Medication Documentation Review Audit       Reviewed by Kaur Diez., PHARMD (Pharmacist) on 12/05/18 at [id number]      Medication Sig Documenting Provider Last Dose Status Taking?   acetaminophen (TYLENOL EXTRA STRENGTH) 500 mg tablet Take  by mouth every six (6) hours as needed for Pain. Provider, Historical 11/5/2018 Unknown time Active Yes           Med Note [facility] Issanithin Mar 8, 2018  1:39 PM) prn   aspirin 81 mg chewable tablet Take 1 Tab by mouth daily for 30 days. Miya Simon 11/28/2018 Unknown time Active Yes   gabapentin (NEURONTIN) 300 mg capsule Take 300 mg by mouth nightly as needed. Provider, Historical 12/4/2018 Unknown time Active Yes   levothyroxine (SYNTHROID) 100 mcg tablet Take 1 tablet by mouth Daily (before breakfast). Patient taking differently:  Take 150 mcg by mouth Daily (before breakfast). Juanpablo Courtney MD 12/5/2018 Unknown time Active Yes           Med Note Roc Hess Dec 4, 2018 11:40 AM)     losartan-hydroCHLOROthiazide (HYZAAR) 100-12.5 mg per tablet Take 0.5 Tabs by mouth daily. Chelita Spencer MD 12/5/2018 Unknown time Active Yes   metoprolol tartrate (LOPRESSOR) 25 mg tablet Take 1 Tab by mouth two (2) times a day for 30 days. JUAN CARLOS Simon  Active Yes   nitroglycerin (NITROSTAT) 0.4 mg SL tablet 1 Tab by SubLINGual route every five (5) minutes as needed for Chest Pain. Up to 3 doses. Chelita Spencer MD  Active Yes   rosuvastatin (CRESTOR) 20 mg tablet Take 1 Tab by mouth nightly. Chelita Spencer MD  Active Yes   VITAMIN D2 50,000 unit capsule Take  by mouth every Monday and Thursday. Provider, Historical 11/28/2018 Unknown time Active Yes                    Thank you,  Nikunj Purvis, Pharm. D.

## 2018-12-05 NOTE — DISCHARGE INSTRUCTIONS
Learning About Coronary Artery Disease (CAD)  What is coronary artery disease? Coronary artery disease (CAD) occurs when plaque builds up in the arteries that bring oxygen-rich blood to your heart. Plaque is a fatty substance made of cholesterol, calcium, and other substances in the blood. This process is called hardening of the arteries, or atherosclerosis. What happens when you have coronary artery disease? · Plaque may narrow the coronary arteries. Narrowed arteries cause poor blood flow. This can lead to angina symptoms such as chest pain or discomfort. If blood flow is completely blocked, you could have a heart attack. · You can slow CAD and reduce the risk of future problems by making changes in your lifestyle. These include quitting smoking and eating heart-healthy foods. · Treatments for CAD, along with changes in your lifestyle, can help you live a longer and healthier life. How can you prevent coronary artery disease? · Do not smoke. It may be the best thing you can do to prevent heart disease. If you need help quitting, talk to your doctor about stop-smoking programs and medicines. These can increase your chances of quitting for good. · Be active. Get at least 30 minutes of exercise on most days of the week. Walking is a good choice. You also may want to do other activities, such as running, swimming, cycling, or playing tennis or team sports. · Eat heart-healthy foods. Eat more fruits and vegetables and less foods that contain saturated and trans fats. Limit alcohol, sodium, and sweets. · Stay at a healthy weight. Lose weight if you need to. · Manage other health problems such as diabetes, high blood pressure, and high cholesterol. · Manage stress. Stress can hurt your heart. To keep stress low, talk about your problems and feelings. Don't keep your feelings hidden. · If you have talked about it with your doctor, take a low-dose aspirin every day.  Aspirin can help certain people lower their risk of a heart attack or stroke. But taking aspirin isn't right for everyone, because it can cause serious bleeding. Do not start taking daily aspirin unless your doctor knows about it. How is coronary artery disease treated? · Your doctor will suggest that you make lifestyle changes. For example, your doctor may ask you to eat healthy foods, quit smoking, lose extra weight, and be more active. · You will have to take medicines. · Your doctor may suggest a procedure to open narrowed or blocked arteries. This is called angioplasty. Or your doctor may suggest using healthy blood vessels to create detours around narrowed or blocked arteries. This is called bypass surgery. Follow-up care is a key part of your treatment and safety. Be sure to make and go to all appointments, and call your doctor if you are having problems. It's also a good idea to know your test results and keep a list of the medicines you take. Where can you learn more? Go to http://alonsoRoadmunkkaylie.info/. Enter [phone] in the search box to learn more about \"Learning About Coronary Artery Disease (CAD). \"  Current as of: December 6, 2017  Content Version: 11.8  © 2828-2415 [employer]. Care instructions adapted under license by [employer] (which disclaims liability or warranty for this information). If you have questions about a medical condition or this instruction, always ask your healthcare professional. [place] [address] any warranty or liability for your use of this information. Radial Cardiac Catheterization/Angiography Discharge Instructions   It is normal to feel tired the first couple days. Take it easy and follow the physicians instructions. CHECK THE CATHETER INSERTION SITE DAILY:   Remove the wrist dressing 24 hours after the procedure. You may shower 24 hours after the procedure. Wash with soap and water and pat dry.    Gentle cleaning of the site with soap and water is sufficient, cover with a dry clean dressing or bandage. Do not apply creams or powders to the area. No soaking the wrist for 3 days. Leave the puncture site open to air after 24 hours post-procedure. CALL THE PHYSICIANS:   If the site becomes red, swollen or feels warm to the touch   If there is bleeding or drainage or if there is unusual pain at the radial site. If there is any minor oozing, you may apply a band-aid and remove after 12 hours. If the bleeding continues, hold pressure with the middle finger against the puncture site and the thumb against the back of the wrist,call 911 to be transported to the hospital.   DO NOT DRIVE YOURSELF, [place] [address]. ACTIVITY:   For the first 24 hours do not manipulate the wrist.   No lifting, pushing or pulling over 3-5 pounds with the affected wrist for 7 daysand no straining the insertion site. Do not life grocery bags or the garbage can, do not run the vacuum  or  for 7 days. Start with short walks as in the hospital and gradually increase as tolerated each day. It is recommended to walk 30 minutes 5-7 days per week. Follow your physicians instructions on activity. Avoid walking outside in extremes of heat or cold. Walk inside when it is cold and windy or hot and humid. Things to keep in mind:   No driving for at least 24 hours, or as designated by your physician. Limit the number of times you go up and down the stairs   Take rests and pace yourself with activity. Be careful and do not strain with bowel movements. MEDICATIONS:   Take all medications as prescribed   Call your physician if you have any questions   Keep an updated list of your medications with you at all times and give a list to your physician and pharmacist   SIGNS AND SYMPTOMS:   Be cautious of symptoms of angina or recurrent symptoms such as chest discomfort, unusual shortness of breath or fatigue.  These could be symptoms of restenosis, a new blockage or a heart attack. If your symptoms are relieved with rest it is still recommended that you notify your physician of recurrent chest pain or discomfort. For CHEST PAIN or symptoms of angina not relieved with rest: If the discomfort is not relieved with rest, and you have been prescribed Nitroglycerin, take as directed (taken under the tongue, one at a time 5 minutes apart for a total of 3 doses). If the discomfort is not relieved after the 3rd nitroglycerin, call 911. If you have not been prescribed Nitroglycerin and your chest discomfort is not relieved with rest, call 911. AFTER CARE:   Follow up with your physician as instructed. Follow a heart healthy diet with proper portion control, daily stress management, daily exercise, blood pressure and cholesterol control , and smoking cessation. Cardiac Catheterization  Discharge Instructions     Do not drive, operate any machinery, or sign any legal documents for 24 hours after your procedure. You must have someone to drive you home.  You may take a shower 24 hours after your cardiac catheterization. Be sure to get the dressing wet and then remove it; gently wash the area with warm soapy water. Pat dry and leave open to air. To help prevent infections, be sure to keep the cath site clean and dry. No lotions, creams, powders, ointments, etc. in the cath site for approximately 1 week.  Do not take a tub bath, get in a hot tub or swimming pool for approximately 5 days or until the cath site is completely healed.  No strenuous activity or heavy lifting over 10 lbs. for 7 days.  Drink plenty of fluids for 24-48 hours after your cath to flush the contrast dye from your kidneys. No alcoholic beverages for 24 hours. You may resume your previous diet (low fat, low cholesterol) after your cath.  After your cath, some bruising or discomfort is common during the healing process.   Tylenol, 1-2 tablets every 6 hours as needed, is recommended if you experience any discomfort. If you experience any signs or symptoms of infection such as fever, chills, or poorly healing incision, persistent tenderness or swelling in the groin, redness and/or warmth to the touch, numbness, significant tingling or pain at the groin site or affected extremity, rash, drainage from the cath site, or if the leg feels tight or swollen, call your physician right away.  If bleeding at the cath site occurs, take a clean gauze pad and apply direct pressure to the groin just above the puncture site. Call 911 immediately, and continue to apply direct pressure until an ambulance gets to your location.  You may return to work  2  days after your cardiac cath if no groin bleeding. Information obtained by :  I understand that if any problems occur once I am at home I am to contact my physician. I understand and acknowledge receipt of the instructions indicated above.                                                                                                                                            R.N.'s Signature                                                                  Date/Time                                                                                                                                              Patient or Representative Signature                                                          Date/Time

## 2018-12-06 VITALS
SYSTOLIC BLOOD PRESSURE: 146 MMHG | WEIGHT: 253.2 LBS | TEMPERATURE: 97.7 F | OXYGEN SATURATION: 94 % | HEART RATE: 75 BPM | HEIGHT: 70 IN | RESPIRATION RATE: 13 BRPM | DIASTOLIC BLOOD PRESSURE: 70 MMHG | BODY MASS INDEX: 36.25 KG/M2

## 2018-12-06 LAB
ANION GAP SERPL CALC-SCNC: 10 MMOL/L (ref 5–15)
BACTERIA SPEC CULT: NORMAL
BACTERIA SPEC CULT: NORMAL
BUN SERPL-MCNC: 17 MG/DL (ref 6–20)
BUN/CREAT SERPL: 17 (ref 12–20)
CALCIUM SERPL-MCNC: 8.5 MG/DL (ref 8.5–10.1)
CHLORIDE SERPL-SCNC: 104 MMOL/L (ref 97–108)
CO2 SERPL-SCNC: 25 MMOL/L (ref 21–32)
CREAT SERPL-MCNC: 1 MG/DL (ref 0.7–1.3)
ERYTHROCYTE [DISTWIDTH] IN BLOOD BY AUTOMATED COUNT: 12.4 % (ref 11.5–14.5)
GLUCOSE SERPL-MCNC: 127 MG/DL (ref 65–100)
HCT VFR BLD AUTO: 35.1 % (ref 36.6–50.3)
HGB BLD-MCNC: 12 G/DL (ref 12.1–17)
MCH RBC QN AUTO: 29.9 PG (ref 26–34)
MCHC RBC AUTO-ENTMCNC: 34.2 G/DL (ref 30–36.5)
MCV RBC AUTO: 87.3 FL (ref 80–99)
NRBC # BLD: 0 K/UL (ref 0–0.01)
NRBC BLD-RTO: 0 PER 100 WBC
PLATELET # BLD AUTO: 200 K/UL (ref 150–400)
PMV BLD AUTO: 9.4 FL (ref 8.9–12.9)
POTASSIUM SERPL-SCNC: 3.9 MMOL/L (ref 3.5–5.1)
RBC # BLD AUTO: 4.02 M/UL (ref 4.1–5.7)
SERVICE CMNT-IMP: NORMAL
SODIUM SERPL-SCNC: 139 MMOL/L (ref 136–145)
WBC # BLD AUTO: 4.6 K/UL (ref 4.1–11.1)

## 2018-12-06 PROCEDURE — 99218 HC RM OBSERVATION: CPT

## 2018-12-06 PROCEDURE — 74011250637 HC RX REV CODE- 250/637: Performed by: INTERNAL MEDICINE

## 2018-12-06 PROCEDURE — 36415 COLL VENOUS BLD VENIPUNCTURE: CPT

## 2018-12-06 PROCEDURE — 80048 BASIC METABOLIC PNL TOTAL CA: CPT

## 2018-12-06 PROCEDURE — 85027 COMPLETE CBC AUTOMATED: CPT

## 2018-12-06 PROCEDURE — C1887 CATHETER, GUIDING: HCPCS

## 2018-12-06 PROCEDURE — 93306 TTE W/DOPPLER COMPLETE: CPT

## 2018-12-06 RX ORDER — ISOSORBIDE MONONITRATE 30 MG/1
30 TABLET, EXTENDED RELEASE ORAL DAILY
Qty: 30 TAB | Refills: 1 | Status: SHIPPED | OUTPATIENT
Start: 2018-12-07 | End: 2018-12-21

## 2018-12-06 RX ADMIN — TICAGRELOR 90 MG: 90 TABLET ORAL at 10:54

## 2018-12-06 RX ADMIN — ISOSORBIDE MONONITRATE 30 MG: 30 TABLET, EXTENDED RELEASE ORAL at 08:55

## 2018-12-06 RX ADMIN — LEVOTHYROXINE SODIUM 150 MCG: 150 TABLET ORAL at 08:55

## 2018-12-06 RX ADMIN — Medication 10 ML: at 05:22

## 2018-12-06 RX ADMIN — METOPROLOL TARTRATE 25 MG: 25 TABLET ORAL at 08:55

## 2018-12-06 RX ADMIN — ASPIRIN 81 MG 81 MG: 81 TABLET ORAL at 08:56

## 2018-12-06 NOTE — PROGRESS NOTES
Problem: Falls - Risk of  Goal: *Absence of Falls  Document Deonna Fall Risk and appropriate interventions in the flowsheet. Outcome: Progressing Towards Goal  Fall Risk Interventions:  Mobility Interventions: Communicate number of staff needed for ambulation/transfer, Patient to call before getting OOB         Medication Interventions: Assess postural VS orthostatic hypotension, Evaluate medications/consider consulting pharmacy, Teach patient to arise slowly    Elimination Interventions: Call light in reach, Patient to call for help with toileting needs, Toileting schedule/hourly rounds             Problem: Pressure Injury - Risk of  Goal: *Prevention of pressure injury  Document Cordell Scale and appropriate interventions in the flowsheet. Outcome: Progressing Towards Goal  Pressure Injury Interventions:             Activity Interventions: Increase time out of bed, Pressure redistribution bed/mattress(bed type)    Mobility Interventions: HOB 30 degrees or less, Pressure redistribution bed/mattress (bed type)    Nutrition Interventions: Document food/fluid/supplement intake

## 2018-12-06 NOTE — PROGRESS NOTES
Shift Summary    0700:  Bedside report done. Patient resting quietly in bed on RA with no signs of distress noted. Patient denies CP and SOB. Patient updated with plan of care. No complaints or concerns voiced. 0800: Patient walked in aragon with tech. No CP reported. 0815: Per Ash Buchanan, patient needs to walk more before he can be discharged. 0900:  Patient resting quietly in bed with family at the bedside. Breakfast ordered. Medications given. 0945:  Patient walked in the aragon. No CP. Awaiting cardiologist for discharge. 1100:  Patient discharged.

## 2018-12-06 NOTE — PROGRESS NOTES
Cardiology Progress Note         NAME:  Eliz Mcfarlane   :   1965   MRN:   [id number]     Assessment/Plan:   1. CAD: cath yesterday with significant 2 V CAD. PCI to RCA , LAD stent patent. Ambulatory this am w/o chest pain or dyspnea. DAPT: asa/brilinta. Cont BB, imdur. Will reassess ischemia burden OP with nuclear stress test > LCflex: Medium size; Ostial/Prox 20%; OM1 - 60%( tandem lesions) RI -  Small to med; Ostial 60-70%; Mid tandem 70% lesions. ECHO pending. Aamir Tofte for d/c today after ECHO evaluated and ambulates in halls. Follow-up Information     Follow up With Specialties Details Why Contact Info    Donovan Haley MD Cardiology On 2018 9 am   [address]  [phone]                 Pt personally seen and examined. Chart reviewed. Agree with advanced NP's history, exam and  A/P with changes/additons. Doing well. No CP with ambulation  ECHO - prelim - EF 55%    Discussed with patient/nursing    Silvia Corcoran MD, [facility] [place]      Subjective:   Admitted after planned cath :   Past 2-3 weeks when he exerts himself walking in parking lot, he gets heart pounding, jaw pain, neck pain (7/10 level)  - (no major chest pain) - better with rest (some COLON). It does not take much for him to have pain. When he had his stent 4 years ago he just was having arm pain. He had just been on Hyzaar yesterday - was not taking an ASA or statin. No pain at rest.         Cardiac ROS: Patient denies any exertional chest pain, dyspnea, palpitations, syncope, orthopnea, edema or paroxysmal nocturnal dyspnea. Previous Cardiac Eval  Cardiac cath  80% mid LAD lesion after D1. Normal LV function EF 60%. severe prox lad stenosis treated with 225 by 15mm xience mamie to 12atm.  Excellent result  Cath 18 Sig 2 V dz; PCI to RCA with MAMIE; patent stent LAD      Review of Systems: No nausea, indigestion, vomiting, pain, cough, sputum. No bleeding. Taking po. Appetite ok. Ambulatory in [place]. Objective:     Visit Vitals  /47   Pulse 70   Temp 98 °F (36.7 °C)   Resp 17   Ht 5' 10\" (1.778 m)   Wt 253 lb 3.2 oz (114.8 kg)   SpO2 94%   BMI 36.33 kg/m²      O2 Device: Room air    Temp (24hrs), Av.2 °F (36.8 °C), Min:97.8 °F (36.6 °C), Max:98.5 °F (36.9 °C)      No intake/output data recorded.  1901 -  0700  In: 1092.5 [P.O.:360; I.V.:732.5]  Out: 2225 [Urine:2225]  TELE:  SR     General: AAOx3 cooperative, no acute distress. HEENT: Atraumatic. Pink and moist.  Anicteric sclerae. Neck : Supple, no thyromegaly. Lungs: CTA bilaterally. No wheezing/rhonchi/rales. Heart: Regular rhythm, no murmur, no rubs, no gallops. No JVD. No carotid bruits. Abdomen: Soft, non-distended, non-tender. + Bowel sounds. No bruits. Extremities: No edema, no clubbing, no cyanosis. No calf tenderness  Neurologic: Grossly intact. Alert and oriented X 3. No acute neurological distress. Psych: Good insight. Not anxious or agitated. Care Plan discussed with:    Comments   Patient x    Family      RN x    Care Manager                    Consultant:          Data Review:     No lab exists for component: ITNL   Recent Labs     18  2043 18  1749   TROIQ <0.05 <0.05     Recent Labs     18  0444 18  1749    140   K 3.9 3.7    103   CO2 25 27   BUN 17 21*   CREA 1.00 1.06   * 111*   ALB  --  4.1   WBC 4.6 4.2   HGB 12.0* 12.8   HCT 35.1* 36.1*    202     No results for input(s): INR, PTP, APTT in the last 72 hours.     No lab exists for component: INREXT    Medications reviewed  Current Facility-Administered Medications   Medication Dose Route Frequency    sodium chloride (NS) flush 5-10 mL  5-10 mL IntraVENous Q8H    sodium chloride (NS) flush 5-10 mL  5-10 mL IntraVENous PRN    acetaminophen (TYLENOL) tablet 650 mg  650 mg Oral Q4H PRN    sodium chloride (NS) flush 5-10 mL 5-10 mL IntraVENous Q8H    sodium chloride (NS) flush 5-10 mL  5-10 mL IntraVENous PRN    aspirin chewable tablet 81 mg  81 mg Oral DAILY    ticagrelor (BRILINTA) tablet 90 mg  90 mg Oral Q12H    levothyroxine (SYNTHROID) tablet 150 mcg  150 mcg Oral ACB    metoprolol tartrate (LOPRESSOR) tablet 25 mg  25 mg Oral BID    rosuvastatin (CRESTOR) tablet 20 mg  20 mg Oral QHS    isosorbide mononitrate ER (IMDUR) tablet 30 mg  30 mg Oral DAILY         Xi Oreilly, NP

## 2018-12-06 NOTE — CARDIO/PULMONARY
Cardiac Rehab: 12/6/2018 @ 0906:  MI education folder, with catheterization brochure, to bedside of Luz Marina Viramontes. Met with pt ambulating in hallway. Wife is present in room  and received permission to discuss health issues with wife present. Reviewed MI diagnosis definition and purpose of intervention. 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 Briefly reviewed post cath instructions via right radial and femoral site, with emphasis on temporary restrictions, signs/symptoms of infection, f/u with MD, what to do if bleeding occurs, and importance of taking all meds as prescribed. Briefly reviewed risk factors for CAD to include the following: family history, elevated BMI, hyperlipidemia, hypertension (Pt was former smoker, quit 2 years ago). Discussed Heart Healthy/Low Sodium (2000 mg) diet. Reviewed the importance of new cardiac medication compliance, the purpose of Brilinta, Imdur and potential side effects. Discussed follow up appointments with cardiologist, signs and symptoms of angina, and what to report to physician after discharge. Emphasized the value of cardiac rehab. Discussed Cardiac Rehab Program format, benefits, and encouraged enrollment to assist with risk modification and management. Pt would like to attend Cardiac Rehab Program.  Cardiac rehab will call pt for initial intake appointment  Marques Ramírez/wife verbalized understanding with questions answered.   Suyapa Chen RN

## 2018-12-06 NOTE — PROGRESS NOTES
1936 Received report. No c/o discomforts. 1871 Bedside shift change report given to GERARDO Sinha .  Report included the following information SBAR, Kardex, Procedure Summary, Intake/Output, MAR, Accordion, Recent Results, Med Rec Status and Cardiac Rhythm SR.

## 2018-12-17 ENCOUNTER — HOSPITAL ENCOUNTER (OUTPATIENT)
Dept: CARDIAC REHAB | Age: 53
Discharge: HOME | End: 2018-12-17
Payer: COMMERCIAL

## 2018-12-17 VITALS — HEIGHT: 71 IN | BODY MASS INDEX: 35.91 KG/M2 | WEIGHT: 256.5 LBS

## 2018-12-17 PROCEDURE — 93798 PHYS/QHP OP CAR RHAB W/ECG: CPT

## 2018-12-17 NOTE — CARDIO/PULMONARY
Cardiac Rehab Intake:  Marlene Moulton  65, presented for cardiac rehab orientation and exercise tolerance test today with a primary diagnosis of PCTA/stent to RCA. Pt has history of PCTA/stent to LAD () LVEF is 55%. Cardiac risk factors include: age, gender, HTN, HLD, smoking, and obesity. CAD risk factors were reviewed with patient. Pt resides with his wife in [facility]. 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 Pt's score PHQ9 is 8 and this is considered indicative of mild depression. The result was discussed with patient who affirms score to be accurate. He identified chronic joint pain as the primary cause of his depression. He also has constant right neck pain following treatment for tonsil cancer (). Pt was encouraged to talk with his PCP about possible tx options. Patient denied chest pain or SOB during 6 minute walk on treadmill at 2.2 mph, with RPE 13. Cardiac rhythm was SR/ST with rare PVC and PAC. Exercise plan was developed. Pt will attend cardiac rehab 2-3 times per week. Pt verbalized plan to engage in home exercise of walking at least 3 days per week. Improving nutrition and weight loss are also important goals to patient.

## 2018-12-18 NOTE — PROGRESS NOTES
I have reviewed the notes, assessments, and/or procedures performed by Earl Grimaldo MD  , I concur with her/his documentation of Víctor Lopez.

## 2018-12-21 ENCOUNTER — OFFICE VISIT (OUTPATIENT)
Dept: CARDIOLOGY CLINIC | Age: 53
End: 2018-12-21

## 2018-12-21 VITALS
DIASTOLIC BLOOD PRESSURE: 78 MMHG | WEIGHT: 252.8 LBS | RESPIRATION RATE: 18 BRPM | HEART RATE: 64 BPM | BODY MASS INDEX: 35.39 KG/M2 | SYSTOLIC BLOOD PRESSURE: 132 MMHG | HEIGHT: 71 IN

## 2018-12-21 DIAGNOSIS — E07.9 THYROID DISEASE: ICD-10-CM

## 2018-12-21 DIAGNOSIS — E78.5 DYSLIPIDEMIA: ICD-10-CM

## 2018-12-21 DIAGNOSIS — I25.10 CORONARY ARTERY DISEASE INVOLVING NATIVE CORONARY ARTERY OF NATIVE HEART WITHOUT ANGINA PECTORIS: Primary | ICD-10-CM

## 2018-12-21 DIAGNOSIS — R73.09 ELEVATED GLUCOSE: ICD-10-CM

## 2018-12-21 RX ORDER — GUAIFENESIN 100 MG/5ML
81 LIQUID (ML) ORAL DAILY
Qty: 30 TAB | Refills: 0
Start: 2018-12-21 | End: 2021-04-01

## 2018-12-21 RX ORDER — LEVOTHYROXINE SODIUM 150 UG/1
TABLET ORAL
COMMUNITY
End: 2021-05-19 | Stop reason: DRUGHIGH

## 2018-12-21 NOTE — PROGRESS NOTES
Michael Gracia MD. [facility]              Patient: Marquis Marroquin  : 1965      Today's Date: 2018            HISTORY OF PRESENT ILLNESS:     History of Present Illness:  Here for follow-up. He is doing well without complaints. No CP or SOB since PCI. Feels well. PAST MEDICAL HISTORY:     Past Medical History:   Diagnosis Date    Arthritis     ankles, feet, hands    CAD (coronary artery disease)     s/p cath on 14 with 80% prox/mid LAD lesion     Cancer (HCC)     tonsil cancer    GERD (gastroesophageal reflux disease)     Hypertension     Hypothyroidism     Psychiatric disorder     depression    Seizures ([facility] [address]        Past Surgical History:   Procedure Laterality Date    CARDIAC SURG PROCEDURE UNLIST      stent    HX HEENT      tonsillectomy for malignant tumor    HX OTHER SURGICAL      tonsil cancer (removed)         MEDICATIONS:     Current Outpatient Medications   Medication Sig Dispense Refill    levothyroxine (SYNTHROID) 150 mcg tablet Take  by mouth Daily (before breakfast).  isosorbide mononitrate ER (IMDUR) 30 mg tablet Take 1 Tab by mouth daily. 30 Tab 1    ticagrelor (BRILINTA) 90 mg tablet Take 1 Tab by mouth every twelve (12) hours every twelve (12) hours. 60 Tab 11    VITAMIN D2 50,000 unit capsule Take  by mouth every Monday and Thursday.  gabapentin (NEURONTIN) 300 mg capsule Take 300 mg by mouth nightly as needed.  losartan-hydroCHLOROthiazide (HYZAAR) 100-12.5 mg per tablet Take 0.5 Tabs by mouth daily. 30 Tab 6    nitroglycerin (NITROSTAT) 0.4 mg SL tablet 1 Tab by SubLINGual route every five (5) minutes as needed for Chest Pain. Up to 3 doses. 25 Tab 3    rosuvastatin (CRESTOR) 20 mg tablet Take 1 Tab by mouth nightly. 30 Tab 6    aspirin 81 mg chewable tablet Take 1 Tab by mouth daily for 30 days. 30 Tab 0    metoprolol tartrate (LOPRESSOR) 25 mg tablet Take 1 Tab by mouth two (2) times a day for 30 days.  60 Tab 0    acetaminophen (TYLENOL EXTRA STRENGTH) 500 mg tablet Take  by mouth every six (6) hours as needed for Pain. No Known Allergies        SOCIAL HISTORY:     Social History     Tobacco Use    Smoking status: Former Smoker     Packs/day: 2.00     Years: 38.00     Pack years: 76.00     Last attempt to quit: 2017     Years since quittin.9    Smokeless tobacco: Former User     Quit date: 1980    Tobacco comment: Started at age 12 or 16   Substance Use Topics    Alcohol use: Yes     Alcohol/week: 1.2 - 1.8 oz     Types: 2 - 3 Cans of beer per week     Comment: twice a week when eating out     Drug use: No         FAMILY HISTORY:     Family History   Problem Relation Age of Onset    Thyroid Disease Mother     Heart Attack Father     Heart Disease Father         cabg  x4 vessel    Hypertension Father     Anesth Problems Neg Hx               REVIEW OF SYMPTOMS:      Review of Symptoms:  Constitutional: Negative for fever, chills  HEENT: Negative for nosebleeds, tinnitus, and vision changes. Respiratory: Negative for cough, wheezing  Cardiovascular: Negative for orthopnea, claudication, syncope, and PND. Gastrointestinal: Negative for abdominal pain, diarrhea, melena. Genitourinary: Negative for dysuria  Musculoskeletal: Negative for myalgias. Skin: Negative for rash  Heme: No problems bleeding. Neurological: Negative for speech change and focal weakness.            PHYSICAL EXAM:      Physical Exam:    Visit Vitals  /78 (BP 1 Location: Left arm)   Pulse 64   Resp 18   Ht 5' 11\" (1.803 m)   Wt 252 lb 12.8 oz (114.7 kg)   BMI 35.26 kg/m²         Patient appears generally well, mood and affect are appropriate and pleasant. HEENT:  Hearing intact, non-icteric, normocephalic, atraumatic. Neck Exam: Supple, No JVD or carotid bruits. Lung Exam: Clear to auscultation, even breath sounds. Cardiac Exam: Regular rate and rhythm with no murmur  Abdomen: Soft, non-tender, normal bowel sounds. No bruits or masses. Extremities: Moves all ext well. No lower extremity edema. Vascular: 2+ dorsalis pedis pulses bilaterally. Psych: Appropriate affect  Neuro - Grossly intact           LABS / OTHER STUDIES:      Lab Results   Component Value Date/Time    Sodium 139 12/06/2018 04:44 AM    Potassium 3.9 12/06/2018 04:44 AM    Chloride 104 12/06/2018 04:44 AM    CO2 25 12/06/2018 04:44 AM    Anion gap 10 12/06/2018 04:44 AM    Glucose 127 (H) 12/06/2018 04:44 AM    BUN 17 12/06/2018 04:44 AM    Creatinine 1.00 12/06/2018 04:44 AM    BUN/Creatinine ratio 17 12/06/2018 04:44 AM    GFR est AA >60 12/06/2018 04:44 AM    GFR est non-AA >60 12/06/2018 04:44 AM    Calcium 8.5 12/06/2018 04:44 AM    Bilirubin, total 0.3 12/03/2018 05:49 PM    AST (SGOT) 22 12/03/2018 05:49 PM    Alk. phosphatase 47 12/03/2018 05:49 PM    Protein, total 7.6 12/03/2018 05:49 PM    Albumin 4.1 12/03/2018 05:49 PM    Globulin 3.5 12/03/2018 05:49 PM    A-G Ratio 1.2 12/03/2018 05:49 PM    ALT (SGPT) 58 12/03/2018 05:49 PM     Lab Results   Component Value Date/Time    WBC 4.6 12/06/2018 04:44 AM    HGB 12.0 (L) 12/06/2018 04:44 AM    HCT 35.1 (L) 12/06/2018 04:44 AM    PLATELET 732 01/94/6249 04:44 AM    MCV 87.3 12/06/2018 04:44 AM     Lab Results   Component Value Date/Time    Cholesterol, total 205 (H) 09/30/2014 03:48 AM    HDL Cholesterol 31 09/30/2014 03:48 AM    LDL,Direct 148 (H) 09/30/2014 03:48 AM    LDL, calculated Not calculated due to elevated triglyceride level 09/30/2014 03:48 AM    VLDL, calculated  09/30/2014 03:48 AM     Calculation not valid with this patient's other Lipid values.     Triglyceride 406 (H) 09/30/2014 03:48 AM    CHOL/HDL Ratio 6.6 (H) 09/30/2014 03:48 AM       Labs 10/18 - TSH 32        CARDIAC DIAGNOSTICS:      Cardiac Evaluation Includes:     s/p cath on 9/30/14 with 80% prox/mid LAD lesion     Cath 12/5/18 - L Main: Short/med; Nml.    LAD: Medium;  Prox stent patent; Mid /Distal 40% lesion  LCflex: Medium size; Ostial/Prox 20%; OM1 - 60% (tandem lesions)  RI -  Small to med; Ostial 60-70%; Mid tandem 70% lesions  RCA: Dominant ; Small to Med; Prox  90%; Mid 80%; PDA and PLB - small  - MLI  LVEDP: 13 mm Hg  LVEF:Not assessed  No significant gradient across aortic valve. PCI to RCA with ANNA x 2       Echo 12/6/18 - LVEF 55%, 1+ MR        EKG 12/3/18 - NSR, mild anterior TWI - similar to 2017 EKG             ASSESSMENT AND PLAN:      Assessment and Plan:  1) CAD  - s/p cath on 9/30/14 with 80% prox/mid LAD lesion   - He went to ER on 12/3/18 with unstable angina with exertional chest pain (class III angina)   - Proceeded to urgent cardiac cath 12/5/18 and had 2 ANNA to RCA lesion   - Doing well since PCI - cont DAPT, statin, BB  - Will stop Imdur (has some lightheadedness when he first stands up)      2) HTN  - BP OK   - watch for signs of lightheadedness - will adjust meds as needed      3) Dyslipidemia  - Cont crestor   - recheck lipids and A1c     4) See me in 6 months. Patient expressed understanding of the plan - questions were answered. 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 Grew up in [place] (moved here in 2012).       Laure Hickey MD, [address]  [address] [facility], [address]      [address] [place] [address]  [address], [address]                 [place], [address] [place] [address]  Ph: [phone]                               Ph [phone]

## 2018-12-21 NOTE — PROGRESS NOTES
Visit Vitals  /78 (BP 1 Location: Left arm)   Pulse 64   Resp 18   Ht 5' 11\" (1.803 m)   Wt 252 lb 12.8 oz (114.7 kg)   BMI 35.26 kg/m²       Patient is here for follow from cath. Doing well. No complaints.

## 2018-12-27 ENCOUNTER — HOSPITAL ENCOUNTER (OUTPATIENT)
Dept: CARDIAC REHAB | Age: 53
Discharge: HOME | End: 2018-12-27
Payer: COMMERCIAL

## 2018-12-27 VITALS — BODY MASS INDEX: 35.5 KG/M2 | WEIGHT: 254.5 LBS

## 2018-12-27 PROCEDURE — 93798 PHYS/QHP OP CAR RHAB W/ECG: CPT

## 2018-12-28 ENCOUNTER — HOSPITAL ENCOUNTER (OUTPATIENT)
Dept: CARDIAC REHAB | Age: 53
Discharge: HOME | End: 2018-12-28
Payer: COMMERCIAL

## 2018-12-28 VITALS — BODY MASS INDEX: 35.84 KG/M2 | WEIGHT: 257 LBS

## 2018-12-28 PROCEDURE — 93798 PHYS/QHP OP CAR RHAB W/ECG: CPT

## 2018-12-31 ENCOUNTER — HOSPITAL ENCOUNTER (OUTPATIENT)
Dept: CARDIAC REHAB | Age: 53
Discharge: HOME | End: 2018-12-31
Payer: COMMERCIAL

## 2018-12-31 VITALS — BODY MASS INDEX: 35.15 KG/M2 | WEIGHT: 252 LBS

## 2018-12-31 PROCEDURE — 93798 PHYS/QHP OP CAR RHAB W/ECG: CPT

## 2019-01-08 ENCOUNTER — HOSPITAL ENCOUNTER (OUTPATIENT)
Dept: CARDIAC REHAB | Age: 54
Discharge: HOME | End: 2019-01-08
Payer: COMMERCIAL

## 2019-01-08 VITALS — BODY MASS INDEX: 35.51 KG/M2 | WEIGHT: 254.6 LBS

## 2019-01-08 PROCEDURE — 93798 PHYS/QHP OP CAR RHAB W/ECG: CPT

## 2019-01-09 ENCOUNTER — HOSPITAL ENCOUNTER (OUTPATIENT)
Dept: CARDIAC REHAB | Age: 54
Discharge: HOME | End: 2019-01-09
Payer: COMMERCIAL

## 2019-01-09 VITALS — BODY MASS INDEX: 35.64 KG/M2 | WEIGHT: 255.5 LBS

## 2019-01-09 PROCEDURE — 93798 PHYS/QHP OP CAR RHAB W/ECG: CPT

## 2019-01-15 ENCOUNTER — HOSPITAL ENCOUNTER (OUTPATIENT)
Dept: CARDIAC REHAB | Age: 54
Discharge: HOME | End: 2019-01-15
Payer: COMMERCIAL

## 2019-01-15 VITALS — WEIGHT: 253.6 LBS | BODY MASS INDEX: 35.37 KG/M2

## 2019-01-15 PROCEDURE — 93798 PHYS/QHP OP CAR RHAB W/ECG: CPT

## 2019-01-16 ENCOUNTER — HOSPITAL ENCOUNTER (OUTPATIENT)
Dept: CARDIAC REHAB | Age: 54
Discharge: HOME | End: 2019-01-16
Payer: COMMERCIAL

## 2019-01-16 VITALS — WEIGHT: 253.2 LBS | BODY MASS INDEX: 35.31 KG/M2

## 2019-01-16 PROCEDURE — 93798 PHYS/QHP OP CAR RHAB W/ECG: CPT

## 2019-01-24 ENCOUNTER — HOSPITAL ENCOUNTER (OUTPATIENT)
Dept: CARDIAC REHAB | Age: 54
Discharge: HOME | End: 2019-01-24
Payer: COMMERCIAL

## 2019-01-24 VITALS — WEIGHT: 255.4 LBS | BODY MASS INDEX: 35.62 KG/M2

## 2019-01-24 PROCEDURE — 93798 PHYS/QHP OP CAR RHAB W/ECG: CPT

## 2019-01-28 ENCOUNTER — HOSPITAL ENCOUNTER (OUTPATIENT)
Dept: CARDIAC REHAB | Age: 54
Discharge: HOME | End: 2019-01-28
Payer: COMMERCIAL

## 2019-01-28 VITALS — BODY MASS INDEX: 35.22 KG/M2 | WEIGHT: 252.5 LBS

## 2019-01-28 PROCEDURE — 93798 PHYS/QHP OP CAR RHAB W/ECG: CPT

## 2019-02-15 NOTE — CARDIO/PULMONARY
Sadie Hatch  48 y.o. With diagnosis of PCI to RCA attended phase II cardiac rehab for 10 sessions between 12/17/18. and 1/28/19. Phone conversation with patient to schedule exercise sessions. Pt wishes to be discharged from Cardiac Rehab Phase II. Pt stated he did not feel it was helping him.     Fannie Fernandez, GERARDO  2/15/2019

## 2019-08-09 ENCOUNTER — OFFICE VISIT (OUTPATIENT)
Dept: CARDIOLOGY CLINIC | Age: 54
End: 2019-08-09

## 2019-08-09 VITALS
SYSTOLIC BLOOD PRESSURE: 150 MMHG | BODY MASS INDEX: 35 KG/M2 | OXYGEN SATURATION: 96 % | DIASTOLIC BLOOD PRESSURE: 92 MMHG | HEIGHT: 71 IN | HEART RATE: 70 BPM | WEIGHT: 250 LBS

## 2019-08-09 DIAGNOSIS — E78.5 DYSLIPIDEMIA: ICD-10-CM

## 2019-08-09 DIAGNOSIS — I10 ESSENTIAL HYPERTENSION: ICD-10-CM

## 2019-08-09 DIAGNOSIS — I25.10 CORONARY ARTERY DISEASE WITHOUT ANGINA PECTORIS, UNSPECIFIED VESSEL OR LESION TYPE, UNSPECIFIED WHETHER NATIVE OR TRANSPLANTED HEART: Primary | ICD-10-CM

## 2019-08-09 RX ORDER — CARVEDILOL 6.25 MG/1
6.25 TABLET ORAL 2 TIMES DAILY WITH MEALS
Qty: 60 TAB | Refills: 12 | Status: SHIPPED | OUTPATIENT
Start: 2019-08-09 | End: 2021-04-01

## 2019-08-09 RX ORDER — LOSARTAN POTASSIUM 25 MG/1
25 TABLET ORAL DAILY
Qty: 30 TAB | Refills: 12 | Status: SHIPPED | OUTPATIENT
Start: 2019-08-09 | End: 2021-04-01

## 2019-08-09 NOTE — PROGRESS NOTES
Chief Complaint   Patient presents with    Other     Clearance to r/t work    Coronary Artery Disease    Cholesterol Problem     Vitals:    08/09/19 0921 08/09/19 0944 08/09/19 0946 08/09/19 0949   BP: 142/90 142/88 148/90 (!) 150/92   BP 1 Location: Left arm Right arm Right arm Right arm   BP Patient Position: Sitting Sitting Sitting Sitting   Pulse: 70      SpO2: 96%      Weight: 250 lb (113.4 kg)      Height: 5' 11\" (1.803 m)          Chest pain denied  SOB denied  Dizziness if sitting for a while, when stand up, wobbly; been a few years. Has been happening more often. Can hear his heart. Checked orthos at Dr. Eric Flores a month ago, BP goes up. Thinks he has had carotid's done before. Swelling legs occasionally +1; pitting occassionally  Recent hospital visit stent in Dec    Brilinta stopped about 2 weeks ago to take NSAIDS for arthritis pain (major joints). Past few months has flared up. Right shoulder has been worse in last month or so. Hasn't seen a specialist yet. Takes aleve, motrin, tylenol. Dr. Laura Avina knows about the chronic pain, but not the arthritis pain recently.

## 2019-08-09 NOTE — LETTER
NOTIFICATION OF RETURN TO WORK 
 
8/9/2019 Mr. Juanita Magaña [phone] [place] [id number] To Whom It May Concern: 
 
Juanita Magaña was seen in my office today 8/9/19. He is clear to work with no restrictions. If there are questions or concerns please have the patient contact our office. Sincerely, Damaris Conrad MD

## 2019-08-09 NOTE — PROGRESS NOTES
Zo Gómez MD. 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 He has some dizziness when he stands up from sitting (lasts about a minute and then fine). No CP. Active physically - can climb 2 flights of stairs easily. /90 as outpatient. Having lots of joint pain so taking NSAIDS. PAST MEDICAL HISTORY:     Past Medical History:   Diagnosis Date    Arthritis     ankles, feet, hands    CAD (coronary artery disease)     s/p cath on 14 with 80% prox/mid LAD lesion;   --> PCI to RCA (2 ANNA)    Cancer ([facility] [address]     tonsil cancer    Dyslipidemia     GERD (gastroesophageal reflux disease)     Hypertension     Hypothyroidism     Psychiatric disorder     depression    Seizures ([facility] [address]          Past Surgical History:   Procedure Laterality Date    CARDIAC SURG PROCEDURE UNLIST      stent    HX HEENT      tonsillectomy for malignant tumor    HX OTHER SURGICAL      tonsil cancer (removed)         MEDICATIONS:     Current Outpatient Medications   Medication Sig Dispense Refill    levothyroxine (SYNTHROID) 150 mcg tablet Take  by mouth Daily (before breakfast).  aspirin 81 mg chewable tablet Take 1 Tab by mouth daily. 30 Tab 0    ticagrelor (BRILINTA) 90 mg tablet Take 1 Tab by mouth every twelve (12) hours every twelve (12) hours. 60 Tab 11    gabapentin (NEURONTIN) 300 mg capsule Take 300 mg by mouth nightly as needed.  losartan-hydroCHLOROthiazide (HYZAAR) 100-12.5 mg per tablet Take 0.5 Tabs by mouth daily. 30 Tab 6    nitroglycerin (NITROSTAT) 0.4 mg SL tablet 1 Tab by SubLINGual route every five (5) minutes as needed for Chest Pain. Up to 3 doses. 25 Tab 3    rosuvastatin (CRESTOR) 20 mg tablet Take 1 Tab by mouth nightly.  30 Tab 6    acetaminophen (TYLENOL EXTRA STRENGTH) 500 mg tablet Take  by mouth every six (6) hours as needed for Pain. No Known Allergies        SOCIAL HISTORY:     Social History     Tobacco Use    Smoking status: Former Smoker     Packs/day: 2.00     Years: 38.00     Pack years: 76.00     Last attempt to quit: 2017     Years since quittin.6    Smokeless tobacco: Former User     Quit date: 1980    Tobacco comment: Started at age 12 or 16   Substance Use Topics    Alcohol use: Yes     Alcohol/week: 2.0 - 3.0 standard drinks     Types: 2 - 3 Cans of beer per week     Comment: twice a week when eating out     Drug use: No         FAMILY HISTORY:     Family History   Problem Relation Age of Onset    Thyroid Disease Mother     Heart Attack Father     Heart Disease Father         cabg  x4 vessel    Hypertension Father     Anesth Problems Neg Hx             REVIEW OF SYMPTOMS:      Review of Symptoms:  Constitutional: Negative for fever, chills  HEENT: Negative for nosebleeds, tinnitus, and vision changes. Respiratory: Negative for cough, wheezing  Cardiovascular: Negative for orthopnea, claudication, syncope, and PND. Gastrointestinal: Negative for abdominal pain, diarrhea, melena. Genitourinary: Negative for dysuria  Musculoskeletal: Negative for myalgias. Skin: Negative for rash  Heme: No problems bleeding. Neurological: Negative for speech change and focal weakness.            PHYSICAL EXAM:      Physical Exam:    Visit Vitals  /88 (BP 1 Location: Right arm, BP Patient Position: Sitting)   Pulse 70   Ht 5' 11\" (1.803 m)   Wt 250 lb (113.4 kg)   SpO2 96%   BMI 34.87 kg/m²           Patient appears generally well, mood and affect are appropriate and pleasant. HEENT:  Hearing intact, non-icteric, normocephalic, atraumatic. Neck Exam: Supple, No JVD or carotid bruits. Lung Exam: Clear to auscultation, even breath sounds. Cardiac Exam: Regular rate and rhythm with no murmur  Abdomen: Soft, non-tender, normal bowel sounds. Extremities: Moves all ext well. No lower extremity edema. Psych: Appropriate affect  Neuro - Grossly intact           LABS / OTHER STUDIES:        Lab Results   Component Value Date/Time    Sodium 139 12/06/2018 04:44 AM    Potassium 3.9 12/06/2018 04:44 AM    Chloride 104 12/06/2018 04:44 AM    CO2 25 12/06/2018 04:44 AM    Anion gap 10 12/06/2018 04:44 AM    Glucose 127 (H) 12/06/2018 04:44 AM    BUN 17 12/06/2018 04:44 AM    Creatinine 1.00 12/06/2018 04:44 AM    BUN/Creatinine ratio 17 12/06/2018 04:44 AM    GFR est AA >60 12/06/2018 04:44 AM    GFR est non-AA >60 12/06/2018 04:44 AM    Calcium 8.5 12/06/2018 04:44 AM    Bilirubin, total 0.3 12/03/2018 05:49 PM    AST (SGOT) 22 12/03/2018 05:49 PM    Alk. phosphatase 47 12/03/2018 05:49 PM    Protein, total 7.6 12/03/2018 05:49 PM    Albumin 4.1 12/03/2018 05:49 PM    Globulin 3.5 12/03/2018 05:49 PM    A-G Ratio 1.2 12/03/2018 05:49 PM    ALT (SGPT) 58 12/03/2018 05:49 PM       Lab Results   Component Value Date/Time    Cholesterol, total 205 (H) 09/30/2014 03:48 AM    HDL Cholesterol 31 09/30/2014 03:48 AM    LDL,Direct 148 (H) 09/30/2014 03:48 AM    LDL, calculated Not calculated due to elevated triglyceride level 09/30/2014 03:48 AM    VLDL, calculated  09/30/2014 03:48 AM     Calculation not valid with this patient's other Lipid values. Triglyceride 406 (H) 09/30/2014 03:48 AM    CHOL/HDL Ratio 6.6 (H) 09/30/2014 03:48 AM       Lab Results   Component Value Date/Time    TSH 5.40 (H) 10/01/2014 03:44 AM     Will get labs from PCP to review.           CARDIAC DIAGNOSTICS:      Cardiac Evaluation Includes:     s/p cath on 9/30/14 with 80% prox/mid LAD lesion      Cath 12/5/18 - L Main: Short/med; Nml.    LAD: Medium;  Prox stent patent; Mid /Distal 40% lesion  LCflex: Medium size; Ostial/Prox 20%; OM1 - 60% (tandem lesions)  RI -  Small to med; Ostial 60-70%; Mid tandem 70% lesions  RCA: Dominant ; Small to Med; Prox  90%;  Mid 80%; PDA and PLB - small  - MLI  LVEDP: 13 mm Hg  LVEF:Not assessed  No significant gradient across aortic valve. PCI to RCA with ANNA x 2       Echo 12/6/18 - LVEF 55%, 1+ MR           EKG 12/3/18 - NSR, mild anterior TWI - similar to 2017 EKG   EKG 8/9/19 - NSR, normal            ASSESSMENT AND PLAN:      Assessment and Plan:  1) CAD  - s/p cath on 9/30/14 with 80% prox/mid LAD lesion   - He went to ER on 12/3/18 with unstable angina with exertional chest pain (class III angina)   - Proceeded to urgent cardiac cath 12/5/18 and had 2 ANNA to RCA lesion   - Doing well since PCI -- no more anginal complaints   - cont ASA, statin  - He stopped his Arloa [place] since has needed more NSAIDS for arthritis -- I reviewed risk of NSAIDS with him (increased CV risk)   - resume metoprolol (see below)   - He is OK to resume work without problems      2) HTN  - On 8/9/19 --  Has dizziness for 1 minute after standing up from prolonged sitting ---> BP is high in the office --> will get him back on a beta-blocker (coreg) and cut back ARB (switch to losatan 25 mg daily)      3) Dyslipidemia  - Has had lots of joint pain lately (this predated his statin)   - He does not feel problems related to statin and he wants to keep that going      4) See me in 2 weeks. Patient expressed understanding of the plan - questions were answered. 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  Grew up in [place] (moved here in 2012).  Lives in [address], [address]  [address] [place] [address] [address], [address]      [address] [address]  [address]  Kathi Tejeda, [facility] [address] [facility]  Ph: [phone]                               [phone]

## 2020-01-01 ENCOUNTER — APPOINTMENT (OUTPATIENT)
Dept: GENERAL RADIOLOGY | Age: 55
End: 2020-01-01
Attending: EMERGENCY MEDICINE
Payer: COMMERCIAL

## 2020-01-01 ENCOUNTER — HOSPITAL ENCOUNTER (EMERGENCY)
Age: 55
Discharge: HOME | End: 2020-01-01
Attending: EMERGENCY MEDICINE
Payer: COMMERCIAL

## 2020-01-01 VITALS
DIASTOLIC BLOOD PRESSURE: 70 MMHG | RESPIRATION RATE: 28 BRPM | OXYGEN SATURATION: 96 % | HEART RATE: 84 BPM | TEMPERATURE: 98.1 F | SYSTOLIC BLOOD PRESSURE: 140 MMHG | BODY MASS INDEX: 36.68 KG/M2 | WEIGHT: 263.01 LBS

## 2020-01-01 DIAGNOSIS — M79.601 RIGHT ARM PAIN: Primary | ICD-10-CM

## 2020-01-01 LAB
ALBUMIN SERPL-MCNC: 4.1 G/DL (ref 3.5–5)
ALBUMIN/GLOB SERPL: 1.2 {RATIO} (ref 1.1–2.2)
ALP SERPL-CCNC: 43 U/L (ref 45–117)
ALT SERPL-CCNC: 58 U/L (ref 12–78)
ANION GAP SERPL CALC-SCNC: 10 MMOL/L (ref 5–15)
AST SERPL-CCNC: 28 U/L (ref 15–37)
BASOPHILS # BLD: 0 K/UL (ref 0–0.1)
BASOPHILS NFR BLD: 0 % (ref 0–1)
BILIRUB SERPL-MCNC: 0.4 MG/DL (ref 0.2–1)
BUN SERPL-MCNC: 20 MG/DL (ref 6–20)
BUN/CREAT SERPL: 19 (ref 12–20)
CALCIUM SERPL-MCNC: 9 MG/DL (ref 8.5–10.1)
CHLORIDE SERPL-SCNC: 104 MMOL/L (ref 97–108)
CO2 SERPL-SCNC: 26 MMOL/L (ref 21–32)
COMMENT, HOLDF: NORMAL
CREAT SERPL-MCNC: 1.07 MG/DL (ref 0.7–1.3)
DIFFERENTIAL METHOD BLD: ABNORMAL
EOSINOPHIL # BLD: 0.1 K/UL (ref 0–0.4)
EOSINOPHIL NFR BLD: 2 % (ref 0–7)
ERYTHROCYTE [DISTWIDTH] IN BLOOD BY AUTOMATED COUNT: 12.8 % (ref 11.5–14.5)
GLOBULIN SER CALC-MCNC: 3.3 G/DL (ref 2–4)
GLUCOSE SERPL-MCNC: 135 MG/DL (ref 65–100)
HCT VFR BLD AUTO: 38.6 % (ref 36.6–50.3)
HGB BLD-MCNC: 13.3 G/DL (ref 12.1–17)
LYMPHOCYTES # BLD: 1.1 K/UL
LYMPHOCYTES NFR BLD: 28 % (ref 12–49)
MCH RBC QN AUTO: 30.4 PG (ref 26–34)
MCHC RBC AUTO-ENTMCNC: 34.5 G/DL (ref 30–36.5)
MCV RBC AUTO: 88.3 FL (ref 80–99)
MONOCYTES # BLD: 0.6 K/UL (ref 0–1)
MONOCYTES NFR BLD: 16 % (ref 5–13)
NEUTS SEG # BLD: 2.1 K/UL (ref 1.8–8)
NEUTS SEG NFR BLD: 54 % (ref 32–75)
PLATELET # BLD AUTO: 178 K/UL (ref 150–400)
PMV BLD AUTO: 9.7 FL (ref 8.9–12.9)
POTASSIUM SERPL-SCNC: 4.2 MMOL/L (ref 3.5–5.1)
PROT SERPL-MCNC: 7.4 G/DL (ref 6.4–8.2)
RBC # BLD AUTO: 4.37 M/UL (ref 4.1–5.7)
SAMPLES BEING HELD,HOLD: NORMAL
SODIUM SERPL-SCNC: 140 MMOL/L (ref 136–145)
TROPONIN I SERPL-MCNC: <0.05 NG/ML
TROPONIN I SERPL-MCNC: <0.05 NG/ML
WBC # BLD AUTO: 4 K/UL (ref 4.1–11.1)

## 2020-01-01 PROCEDURE — 71046 X-RAY EXAM CHEST 2 VIEWS: CPT

## 2020-01-01 PROCEDURE — 99285 EMERGENCY DEPT VISIT HI MDM: CPT

## 2020-01-01 PROCEDURE — 93005 ELECTROCARDIOGRAM TRACING: CPT

## 2020-01-01 PROCEDURE — 36415 COLL VENOUS BLD VENIPUNCTURE: CPT

## 2020-01-01 PROCEDURE — 85025 COMPLETE CBC W/AUTO DIFF WBC: CPT

## 2020-01-01 PROCEDURE — 84484 ASSAY OF TROPONIN QUANT: CPT

## 2020-01-01 PROCEDURE — 80053 COMPREHEN METABOLIC PANEL: CPT

## 2020-01-01 NOTE — ED PROVIDER NOTES
Date of Service:  1/1/2020    Patient:  Tad Lozano    Chief Complaint:  Arm Pain       HPI:  Tad Lozano is a 47 y.o.  male who presents for evaluation of right arm pain. Patient was sitting at home watching TV when he began having a pins and needle type feeling in his right upper extremity. He describes it as a toothache type discomfort 3 out of 10 pain in his entire right arm. He states that several years ago he had the same feeling in his left arm which ended up with him going to the catheterization lab with stents. Patient denies any type of numbness. He denies any type of chest pain shortness of breath abdominal pain nausea vomiting diarrhea headaches fevers or chills. He has no other acute complaints. This pain started while at rest and has been going on for approximately 20-25 minutes.            Past Medical History:   Diagnosis Date    Arthritis     ankles, feet, hands    CAD (coronary artery disease)     s/p cath on 9/30/14 with 80% prox/mid LAD lesion;  12/18 --> PCI to RCA (2 ANNA)    Cancer ([facility] [address]     tonsil cancer    Dyslipidemia     GERD (gastroesophageal reflux disease)     Hypertension     Hypothyroidism     Psychiatric disorder     depression    Seizures ([facility] [address] 1983       Past Surgical History:   Procedure Laterality Date    CARDIAC SURG PROCEDURE UNLIST  2015    stent    HX HEENT      tonsillectomy for malignant tumor    HX OTHER SURGICAL      tonsil cancer (removed)         Family History:   Problem Relation Age of Onset    Thyroid Disease Mother     Heart Attack Father     Heart Disease Father         cabg  x4 vessel    Hypertension Father     Anesth Problems Neg Hx        Social History     Socioeconomic History    Marital status:      Spouse name: Not on file    Number of children: 3    Years of education: Not on file    Highest education level: Not on file   Occupational History    Occupation: RN     Employer: [employer]     Comment: Rehab [address] [employer] resource strain: Not hard at all   [facility] insecurity:     Worry: Not on file     Inability: Not on file    Transportation needs:     Medical: Not on file     Non-medical: Not on file   Tobacco Use    Smoking status: Former Smoker     Packs/day: 2.00     Years: 38.00     Pack years: 76.00     Last attempt to quit: 2017     Years since quitting: 3.0    Smokeless tobacco: Former User     Quit date: 4/6/1980    Tobacco comment: Started at age 12 or 16   Substance and Sexual Activity    Alcohol use: Yes     Alcohol/week: 2.0 - 3.0 standard drinks     Types: 2 - 3 Cans of beer per week     Comment: twice a week when eating out     Drug use: No    Sexual activity: Yes     Partners: Female   Lifestyle    Physical activity:     Days per week: Not on file     Minutes per session: Not on file    Stress: Not on file   Relationships    Social connections:     Talks on phone: Not on file     Gets together: Not on file     Attends [employer] service: Not on file     Active member of club or organization: Not on file     Attends meetings of clubs or organizations: Not on file     Relationship status: Not on file    Intimate partner violence:     Fear of current or ex partner: Not on file     Emotionally abused: Not on file     Physically abused: Not on file     Forced sexual activity: Not on file   Other Topics Concern     Service Not Asked    Blood Transfusions Not Asked    Caffeine Concern Not Asked    Occupational Exposure Not Asked   Tarik Backer Hazards Not Asked    Sleep Concern Not Asked    Stress Concern Not Asked    Weight Concern Not Asked    Special Diet Not Asked    Back Care Not Asked    Exercise Not Asked    Bike Helmet Not Asked    Seat Belt Not Asked    Self-Exams Not Asked   Social History Narrative    Not on file         ALLERGIES: Patient has no known allergies. Review of Systems   Constitutional: Negative for fever. HENT: Negative for hearing loss.     Eyes: Negative for visual disturbance. Respiratory: Negative for shortness of breath. Cardiovascular: Negative for chest pain. Gastrointestinal: Negative for abdominal pain. Genitourinary: Negative for flank pain. Musculoskeletal: Negative for back pain. Right arm \"ache\"   Skin: Negative for rash. Neurological: Negative for dizziness and light-headedness. Psychiatric/Behavioral: Negative for confusion. Vitals:    01/01/20 1638   BP: 155/82   Pulse: 89   Resp: 15   Temp: 98.1 °F (36.7 °C)   SpO2: 98%   Weight: 119.3 kg (263 lb 0.1 oz)            Physical Exam  Vitals signs reviewed. Constitutional:       General: He is not in acute distress. Appearance: He is well-developed. He is not ill-appearing or diaphoretic. HENT:      Head: Normocephalic and atraumatic. Mouth/Throat:      Mouth: Mucous membranes are moist.   Eyes:      Conjunctiva/sclera: Conjunctivae normal.      Pupils: Pupils are equal, round, and reactive to light. Neck:      Musculoskeletal: Normal range of motion and neck supple. Vascular: No JVD. Trachea: No tracheal deviation. Cardiovascular:      Rate and Rhythm: Normal rate and regular rhythm. Pulses: Normal pulses. Heart sounds: No murmur. No friction rub. Pulmonary:      Effort: Pulmonary effort is normal. No respiratory distress. Breath sounds: Normal breath sounds. Abdominal:      General: There is no distension. Palpations: Abdomen is soft. Tenderness: There is no tenderness. Musculoskeletal: Normal range of motion. General: No swelling, tenderness, deformity or signs of injury. Left lower leg: No edema. Skin:     General: Skin is warm and dry. Capillary Refill: Capillary refill takes less than 2 seconds. Findings: No bruising or rash. Neurological:      General: No focal deficit present. Mental Status: He is alert and oriented to person, place, and time. Motor: No weakness.    Psychiatric: Mood and Affect: Mood normal.         Behavior: Behavior normal.          MDM  Number of Diagnoses or Management Options  Right arm pain:     ED Course as of Jan 01 1923 Wed Jan 01, 2020   [address]    [GG]      ED Course User Index  [GG] Lore Salinas DO     VITAL SIGNS:  Patient Vitals for the past 4 hrs:   Temp Pulse Resp BP SpO2   01/01/20 1815  84 28 140/70 96 %   01/01/20 1800  82 20 137/60 96 %   01/01/20 1730  86 15 154/65 97 %   01/01/20 1700  80 15 133/57 96 %   01/01/20 1645  80 16 142/58 96 %   01/01/20 1638 98.1 °F (36.7 °C) 89 15 155/82 98 %         LABS:  Recent Results (from the past 6 hour(s))   EKG, 12 LEAD, INITIAL    Collection Time: 01/01/20  4:32 PM   Result Value Ref Range    Ventricular Rate 90 BPM    Atrial Rate 90 BPM    P-R Interval 116 ms    QRS Duration 74 ms    Q-T Interval 358 ms    QTC Calculation (Bezet) 437 ms    Calculated P Axis 52 degrees    Calculated R Axis 71 degrees    Calculated T Axis 67 degrees    Diagnosis       Normal sinus rhythm  T wave abnormality, consider anterior ischemia  Abnormal ECG  When compared with ECG of 05-DEC-2018 12:05,  No significant change was found     CBC WITH AUTOMATED DIFF    Collection Time: 01/01/20  4:46 PM   Result Value Ref Range    WBC 4.0 (L) 4.1 - 11.1 K/uL    RBC 4.37 4.10 - 5.70 M/uL    HGB 13.3 12.1 - 17.0 g/dL    HCT 38.6 36.6 - 50.3 %    MCV 88.3 80.0 - 99.0 FL    MCH 30.4 26.0 - 34.0 PG    MCHC 34.5 30.0 - 36.5 g/dL    RDW 12.8 11.5 - 14.5 %    PLATELET 744 257 - 799 K/uL    MPV 9.7 8.9 - 12.9 FL    NEUTROPHILS 54 32 - 75 %    LYMPHOCYTES 28 12 - 49 %    MONOCYTES 16 (H) 5 - 13 %    EOSINOPHILS 2 0 - 7 %    BASOPHILS 0 0 - 1 %    ABS. NEUTROPHILS 2.1 1.8 - 8.0 K/UL    ABS. LYMPHOCYTES 1.1 K/UL    ABS. MONOCYTES 0.6 0.0 - 1.0 K/UL    ABS. EOSINOPHILS 0.1 0.0 - 0.4 K/UL    ABS.  BASOPHILS 0.0 0.0 - 0.1 K/UL    DF AUTOMATED     METABOLIC PANEL, COMPREHENSIVE    Collection Time: 01/01/20  4:46 PM   Result Value Ref Range Sodium 140 136 - 145 mmol/L    Potassium 4.2 3.5 - 5.1 mmol/L    Chloride 104 97 - 108 mmol/L    CO2 26 21 - 32 mmol/L    Anion gap 10 5 - 15 mmol/L    Glucose 135 (H) 65 - 100 mg/dL    BUN 20 6 - 20 MG/DL    Creatinine 1.07 0.70 - 1.30 MG/DL    BUN/Creatinine ratio 19 12 - 20      GFR est AA >60 >60 ml/min/1.73m2    GFR est non-AA >60 >60 ml/min/1.73m2    Calcium 9.0 8.5 - 10.1 MG/DL    Bilirubin, total 0.4 0.2 - 1.0 MG/DL    ALT (SGPT) 58 12 - 78 U/L    AST (SGOT) 28 15 - 37 U/L    Alk. phosphatase 43 (L) 45 - 117 U/L    Protein, total 7.4 6.4 - 8.2 g/dL    Albumin 4.1 3.5 - 5.0 g/dL    Globulin 3.3 2.0 - 4.0 g/dL    A-G Ratio 1.2 1.1 - 2.2     TROPONIN I    Collection Time: 01/01/20  4:46 PM   Result Value Ref Range    Troponin-I, Qt. <0.05 <0.05 ng/mL   SAMPLES BEING HELD    Collection Time: 01/01/20  4:46 PM   Result Value Ref Range    SAMPLES BEING HELD 1RED 1BLUE     COMMENT        Add-on orders for these samples will be processed based on acceptable specimen integrity and analyte stability, which may vary by analyte. TROPONIN I    Collection Time: 01/01/20  6:45 PM   Result Value Ref Range    Troponin-I, Qt. <0.05 <0.05 ng/mL        IMAGING:  XR CHEST PA LAT   Final Result   IMPRESSION: No acute cardiopulmonary disease. Medications During Visit:  Medications - No data to display      DECISION MAKING:  Luz Canales is a 47 y.o. male who comes in as above. Labs as above. Repeat troponin also negative. At this time I discussed possible observation status the patient but he is declined consider second troponin. Patient will be discharged home with outpatient follow-up and strict return precautions. Disposition he is agreeable this plan. As for causes may be particularly given the nature of it, less likely cardiac however he does have a history of cardiac history. IMPRESSION:  1.  Right arm pain        DISPOSITION:  Discharged      Current Discharge Medication List           Follow-up Information     Follow up With Specialties Details Why Contact Info    Rafael Varghese MD Family Practice Schedule an appointment as soon as possible for a visit   [facility]  [phone]              The patient is asked to follow-up with their primary care provider in the next several days. They are to call tomorrow for an appointment. The patient is asked to return promptly for any increased concerns or worsening of symptoms. They can return to this emergency department or any other emergency department.     Procedures    [id number]  NSR 90  Normal axis/intervals  Unchanged t wave abnormalities in v2-v6  No acute change

## 2020-01-01 NOTE — ED TRIAGE NOTES
Pt ambulates to treatment area he states that about 15 minutes ago while watching TV he began with right arm that is very similar to the pain he had a year ago just before he had stents placed. He denies any SOB, N/V or chest pain with the arm pain. Denies injury to that arm. He states that he has some neck pain but relates that to his post cancer treatment from the radiation that he had on the right side of his neck.

## 2020-01-01 NOTE — ED NOTES
Dr. Fern Irwin at bedside to update patient regarding results and further care management. Patient remains in no distress. Vital signs updated. Call bell in reach.

## 2020-01-02 LAB
ATRIAL RATE: 90 BPM
CALCULATED P AXIS, ECG09: 52 DEGREES
CALCULATED R AXIS, ECG10: 71 DEGREES
CALCULATED T AXIS, ECG11: 67 DEGREES
DIAGNOSIS, 93000: NORMAL
P-R INTERVAL, ECG05: 116 MS
Q-T INTERVAL, ECG07: 358 MS
QRS DURATION, ECG06: 74 MS
QTC CALCULATION (BEZET), ECG08: 437 MS
VENTRICULAR RATE, ECG03: 90 BPM

## 2020-01-02 NOTE — ED NOTES
The pt is pain free at this time. The pt is discharged home with follow-up instructions and no prescriptions.

## 2020-01-02 NOTE — ED NOTES
Bedside and Verbal shift change report given to Ember Barrera RN (oncoming nurse) by Lillie Lock RN (offgoing nurse).  Report included the following information SBAR, ED Summary, MAR, Recent Results and Cardiac Rhythm NSr.

## 2021-03-30 ENCOUNTER — TELEPHONE (OUTPATIENT)
Dept: CARDIOLOGY CLINIC | Age: 56
End: 2021-03-30

## 2021-03-30 NOTE — TELEPHONE ENCOUNTER
Spoke to pt,  He stated that he was currently at work and would like to get a VV set up with a NP this week and then maybe keep the appt with Dr. Chelly Seals on 22nd and if any testing would like to be ordered maybe even have it performed on that day? He stated that he stopped taking his lisinopril d/t it making him feel like he was dizzy. He also stopped taking his BP meds d/t extreme myalgia \"quite a while ago. \"

## 2021-04-01 ENCOUNTER — VIRTUAL VISIT (OUTPATIENT)
Dept: CARDIOLOGY CLINIC | Age: 56
End: 2021-04-01
Payer: COMMERCIAL

## 2021-04-01 DIAGNOSIS — E78.5 DYSLIPIDEMIA: ICD-10-CM

## 2021-04-01 DIAGNOSIS — E03.9 HYPOTHYROIDISM, UNSPECIFIED TYPE: ICD-10-CM

## 2021-04-01 DIAGNOSIS — I10 ESSENTIAL HYPERTENSION: ICD-10-CM

## 2021-04-01 DIAGNOSIS — I25.10 CORONARY ARTERY DISEASE INVOLVING NATIVE CORONARY ARTERY OF NATIVE HEART WITHOUT ANGINA PECTORIS: Primary | ICD-10-CM

## 2021-04-01 DIAGNOSIS — Z13.1 DIABETES MELLITUS SCREENING: ICD-10-CM

## 2021-04-01 PROCEDURE — 99214 OFFICE O/P EST MOD 30 MIN: CPT | Performed by: NURSE PRACTITIONER

## 2021-04-01 RX ORDER — NITROGLYCERIN 0.4 MG/1
0.4 TABLET SUBLINGUAL
Qty: 25 TAB | Refills: 0 | Status: SHIPPED | OUTPATIENT
Start: 2021-04-01

## 2021-04-01 RX ORDER — PRAVASTATIN SODIUM 40 MG/1
40 TABLET ORAL
Qty: 30 TAB | Refills: 0 | Status: SHIPPED | OUTPATIENT
Start: 2021-04-01 | End: 2021-05-19 | Stop reason: ALTCHOICE

## 2021-04-01 RX ORDER — ASPIRIN 81 MG/1
81 TABLET ORAL DAILY
Qty: 30 TAB | Refills: 11 | Status: SHIPPED | OUTPATIENT
Start: 2021-04-01

## 2021-04-01 NOTE — PROGRESS NOTES
JACK Cash  [address] [place], [address] [facility]    Office [phone]  Fax [phone]        Patient: Zelalem Meng  : 1965      Today's Date: 2021        VIRTUAL VISIT DOCUMENTATION     Pursuant to the emergency declaration under the [address] [place] [address] waiver authority and the [employer] Act, this Virtual  Visit was conducted, with patient's consent, to reduce the patient's risk of exposure to COVID-19 and provide continuity of care for an established patient. Services were provided through a video synchronous discussion virtually to substitute for in-person clinic visit. Pt was in his home during visit. HISTORY OF PRESENT ILLNESS:     History of Present Illness:  Here for f/u; last seen by Dr. Arpit Melton 2019. More exertional fatigue than nml; occ dyspnea with activities such as stairs (that weren't an issue in the past). Noticed change in symptoms 5 to 6mo ago. Pt and his wife often hike 4 to 5 miles for activity - still able to hike but much more fatigued. Denies hx of CP but per chart had in 2018. Per pt, he had left arm pain prior to first PCI. With 2nd set of stents had similar symptoms to now. Off all cardiac meds for about a year. Hx of SOB on Brilinta, presyncope on losartan and coreg, severe myalgias on statin therapy (tried Lipitor and Crestor in past). Stopped baby aspirin as well but for no real reason. Hasn't seen a provider in about 1 year. Weight about 260lbs. 114/80, HR 80. Patient denies any recent chest pain, palpitations, syncope, orthopnea, or paroxysmal nocturnal dyspnea. Mild edema at times - nothing sig per pt.       PAST MEDICAL HISTORY:     Past Medical History:   Diagnosis Date    Arthritis     ankles, feet, hands    CAD (coronary artery disease)     s/p cath on 14 with 80% prox/mid LAD lesion;  --> PCI to RCA (2 ANNA)    Cancer ([facility]     tonsil cancer    Dyslipidemia     GERD (gastroesophageal reflux disease)     Hypertension     Hypothyroidism     Psychiatric disorder     depression    Seizures ([facility]          Past Surgical History:   Procedure Laterality Date    CARDIAC SURG PROCEDURE UNLIST      stent    HX HEENT      tonsillectomy for malignant tumor    HX OTHER SURGICAL      tonsil cancer (removed)         MEDICATIONS:     Current Outpatient Medications on File Prior to Visit   Medication Sig Dispense Refill    levothyroxine (SYNTHROID) 150 mcg tablet Take  by mouth Daily (before breakfast).  [DISCONTINUED] losartan (COZAAR) 25 mg tablet Take 1 Tab by mouth daily. 30 Tab 12    [DISCONTINUED] carvedilol (COREG) 6.25 mg tablet Take 1 Tab by mouth two (2) times daily (with meals). 60 Tab 12    [DISCONTINUED] aspirin 81 mg chewable tablet Take 1 Tab by mouth daily. 30 Tab 0    [DISCONTINUED] ticagrelor (BRILINTA) 90 mg tablet Take 1 Tab by mouth every twelve (12) hours every twelve (12) hours. 60 Tab 11    [DISCONTINUED] gabapentin (NEURONTIN) 300 mg capsule Take 300 mg by mouth nightly as needed.  [DISCONTINUED] nitroglycerin (NITROSTAT) 0.4 mg SL tablet 1 Tab by SubLINGual route every five (5) minutes as needed for Chest Pain. Up to 3 doses. 25 Tab 3    [DISCONTINUED] acetaminophen (TYLENOL EXTRA STRENGTH) 500 mg tablet Take  by mouth every six (6) hours as needed for Pain. No current facility-administered medications on file prior to visit. Allergies   Allergen Reactions    Rosuvastatin Myalgia           SOCIAL HISTORY:     Social History     Tobacco Use    Smoking status: Former Smoker     Packs/day: 2.00     Years: 38.00     Pack years: 76.00     Quit date:      Years since quittin.2    Smokeless tobacco: Former User     Quit date: 1980    Tobacco comment: Started at age 12 or 16   Substance Use Topics    Alcohol use:  Yes Alcohol/week: 2.0 - 3.0 standard drinks     Types: 2 - 3 Cans of beer per week     Comment: twice a week when eating out     Drug use: No         FAMILY HISTORY:     Family History   Problem Relation Age of Onset    Thyroid Disease Mother     Heart Attack Father     Heart Disease Father         cabg  x4 vessel    Hypertension Father     Anesth Problems Neg Hx         REVIEW OF SYMPTOMS:      Review of Symptoms:  Constitutional: Negative for fever, chills  HEENT: Negative for nosebleeds, tinnitus, and vision changes. Respiratory: Negative for cough, wheezing  Cardiovascular: Negative for orthopnea, claudication, syncope, and PND. Gastrointestinal: Negative for abdominal pain, diarrhea, melena. Genitourinary: Negative for dysuria  Musculoskeletal: Negative for myalgias. Skin: Negative for rash  Heme: No problems bleeding. Neurological: Negative for speech change and focal weakness.        PHYSICAL EXAM:      Due to this being a TeleHealth evaluation, many elements of the physical examination are unable to be assessed. General: Well developed, in no acute distress, cooperative and alert  Respiratory: No audible wheezing, no signs of respiratory distress, lips non cyanotic  Neuro: A&Ox3, speech clear, no facial droop, answering questions appropriately  Skin: Skin color is normal. Non diaphoretic on visible skin during exam        LABS / OTHER STUDIES:              Lab Results   Component Value Date/Time    Sodium 140 01/01/2020 04:46 PM    Potassium 4.2 01/01/2020 04:46 PM    Chloride 104 01/01/2020 04:46 PM    CO2 26 01/01/2020 04:46 PM    Anion gap 10 01/01/2020 04:46 PM    Glucose 135 (H) 01/01/2020 04:46 PM    BUN 20 01/01/2020 04:46 PM    Creatinine 1.07 01/01/2020 04:46 PM    BUN/Creatinine ratio 19 01/01/2020 04:46 PM    GFR est AA >60 01/01/2020 04:46 PM    GFR est non-AA >60 01/01/2020 04:46 PM    Calcium 9.0 01/01/2020 04:46 PM    Bilirubin, total 0.4 01/01/2020 04:46 PM    Alk.  phosphatase 43 (L) 01/01/2020 04:46 PM    Protein, total 7.4 01/01/2020 04:46 PM    Albumin 4.1 01/01/2020 04:46 PM    Globulin 3.3 01/01/2020 04:46 PM    A-G Ratio 1.2 01/01/2020 04:46 PM    ALT (SGPT) 58 01/01/2020 04:46 PM       Lab Results   Component Value Date/Time    Cholesterol, total 205 (H) 09/30/2014 03:48 AM    HDL Cholesterol 31 09/30/2014 03:48 AM    LDL,Direct 148 (H) 09/30/2014 03:48 AM    LDL, calculated Not calculated due to elevated triglyceride level 09/30/2014 03:48 AM    VLDL, calculated  09/30/2014 03:48 AM     Calculation not valid with this patient's other Lipid values. Triglyceride 406 (H) 09/30/2014 03:48 AM    CHOL/HDL Ratio 6.6 (H) 09/30/2014 03:48 AM       Lab Results   Component Value Date/Time    TSH 5.40 (H) 10/01/2014 03:44 AM      CARDIAC DIAGNOSTICS:      Cardiac Evaluation Includes:     s/p cath on 9/30/14 with 80% prox/mid LAD lesion      Cath 12/5/18 - L Main: Short/med; Nml.    LAD: Medium;  Prox stent patent; Mid /Distal 40% lesion  LCflex: Medium size; Ostial/Prox 20%; OM1 - 60% (tandem lesions)  RI -  Small to med; Ostial 60-70%; Mid tandem 70% lesions  RCA: Dominant ; Small to Med; Prox  90%; Mid 80%; PDA and PLB - small  - MLI  LVEDP: 13 mm Hg  LVEF:Not assessed  No significant gradient across aortic valve. PCI to RCA with ANNA x 2       Echo 12/6/18 - LVEF 55%, 1+ MR     EKG 12/3/18 - NSR, mild anterior TWI - similar to 2017 EKG   EKG 8/9/19 - NSR, normal      ASSESSMENT AND PLAN:      Assessment and Plan:  CAD  - s/p cath on 9/30/14 with 80% prox/mid LAD lesion   - He went to ER on 12/3/18 with unstable angina with exertional chest pain (class III angina);  Proceeded to urgent cardiac cath 12/5/18 and had 2 ANNA to RCA lesion   - off cardiac meds x1 year; advised to restart aspirin 81m/g and statin - will try pravastatin 40mg/d  - check updated labs - CMP, CBC, and NMR; also check HgbA1c  - decreased exercise tolerance - check updated NM Stress Test (treadmill)    HTN  - Per pt off HTN therapies with  /80, recheck at f/u     Dyslipidemia  - myalgias on statin therapy (tried Lipitor and Crestor in past)   - will try pravastatin 40mg/d - check updated NMR  - if unable to tolerate - consider [id number]    MR - check updated echo    Hypothyroid - taking levo - check updated TSH (last f/u >1yr ago)    F/u with Dr. Arpit Melton as prev scheduled (4/22). Patient expressed understanding of the plan - questions were answered. 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  Grew up in [place] (moved here in 2012).  Lives in [facility].          Greater than 25 minutes was spent in direct video patient care, planning and chart review. This visit was conducted using [employer] telemedicine services.        FRANKO Nava [address] [place] [address], [address] [place] [address]. Messi Caldwell.        [phone] / [phone] Fax

## 2021-04-02 ENCOUNTER — TELEPHONE (OUTPATIENT)
Dept: CARDIOLOGY CLINIC | Age: 56
End: 2021-04-02

## 2021-04-02 RX ORDER — LOSARTAN POTASSIUM 50 MG/1
50 TABLET ORAL DAILY
Qty: 30 TAB | Refills: 0 | Status: SHIPPED | OUTPATIENT
Start: 2021-04-02

## 2021-04-02 NOTE — TELEPHONE ENCOUNTER
Per Zawatt   \" I had told Osmani Floyd yesterday,during my virtual visit, my last BP was 114/78. Claudia Francois I had taken that from a machine memory. ..that was my wife's, she had taken hers the day before. My BP this morning is 161/97, sorry for the mistake. \"   Will restart Losartan at 50mg/d.   Close f/u

## 2021-04-15 NOTE — PROGRESS NOTES
Addendum Labs 4/9/21 reviewed:   , LDL-C 136, HDL 31, , LDL-P 2597, small LDL -P 2006  HgbA1c 6.0%, IR 87, CMP and CBC without sig abn, TSH 11.10     Pt off meds x1 yr. Cholesterol quite elevated - Intolerant to lipitor and crestor - just restarted on pravastatin. Will likely need another agent such as zetia. May also benefit from metformin give preDM if not able to improve with diet/exercise. Needs to see PCP or Endocrine for f/u of thyroid - on synthroid. Pt has appt on 4/22 with Dr. Candie Berman to review results.    __________________________________________________________________

## 2021-04-22 ENCOUNTER — OFFICE VISIT (OUTPATIENT)
Dept: CARDIOLOGY CLINIC | Age: 56
End: 2021-04-22
Payer: COMMERCIAL

## 2021-04-22 VITALS
HEART RATE: 88 BPM | BODY MASS INDEX: 36.68 KG/M2 | SYSTOLIC BLOOD PRESSURE: 126 MMHG | HEIGHT: 71 IN | DIASTOLIC BLOOD PRESSURE: 86 MMHG | WEIGHT: 262 LBS | OXYGEN SATURATION: 96 %

## 2021-04-22 DIAGNOSIS — E78.5 DYSLIPIDEMIA: ICD-10-CM

## 2021-04-22 DIAGNOSIS — E03.9 HYPOTHYROIDISM, UNSPECIFIED TYPE: ICD-10-CM

## 2021-04-22 DIAGNOSIS — I25.118 CORONARY ARTERY DISEASE OF NATIVE ARTERY OF NATIVE HEART WITH STABLE ANGINA PECTORIS (HCC): Primary | ICD-10-CM

## 2021-04-22 DIAGNOSIS — R06.83 SNORING: ICD-10-CM

## 2021-04-22 DIAGNOSIS — I10 ESSENTIAL HYPERTENSION: ICD-10-CM

## 2021-04-22 PROCEDURE — 99214 OFFICE O/P EST MOD 30 MIN: CPT | Performed by: SPECIALIST

## 2021-04-22 NOTE — PROGRESS NOTES
Room 5    Visit Vitals  /86 (BP 1 Location: Left upper arm, BP Patient Position: Sitting, BP Cuff Size: Large adult)   Pulse 88   Ht 5' 11\" (1.803 m)   Wt 262 lb (118.8 kg)   SpO2 96%   BMI 36.54 kg/m²       Lab results    Chest pain: no  Shortness of breath: no  Edema: no  Palpitations: no  Dizziness: no    New diagnosis/Surgeries: no    ER/Hospitalizations: no    Refills: no

## 2021-04-22 NOTE — PROGRESS NOTES
Tanesha Hall MD. [facility]              Patient: Jaxon Nunez  : 1965      Today's Date: 2021          HISTORY OF PRESENT ILLNESS:     History of Present Illness:  Here for follow-up. He was on a low carb but high meat diet last year. Past few months he's noticed more COLON and weakness. He weakness in thighs. He is able to hike in [address], but does so slowly. No CP. He was off meds for > 1 year. PAST MEDICAL HISTORY:     Past Medical History:   Diagnosis Date    Arthritis     ankles, feet, hands    CAD (coronary artery disease)     s/p cath on 14 with 80% prox/mid LAD lesion;   --> PCI to RCA (2 ANNA)    Cancer ([facility] [address]     tonsil cancer    Dyslipidemia     GERD (gastroesophageal reflux disease)     Hypertension     Hypothyroidism     Psychiatric disorder     depression    Seizures ([facility] [facility]        Past Surgical History:   Procedure Laterality Date    HX HEENT      tonsillectomy for malignant tumor    HX OTHER SURGICAL      tonsil cancer (removed)    IN CARDIAC SURG PROCEDURE UNLIST      stent         MEDICATIONS:     Current Outpatient Medications   Medication Sig Dispense Refill    losartan (COZAAR) 50 mg tablet Take 1 Tab by mouth daily. 30 Tab 0    pravastatin (PRAVACHOL) 40 mg tablet Take 1 Tab by mouth nightly. 30 Tab 0    aspirin delayed-release 81 mg tablet Take 1 Tab by mouth daily. 30 Tab 11    nitroglycerin (NITROSTAT) 0.4 mg SL tablet 1 Tab by SubLINGual route every five (5) minutes as needed for Chest Pain. Up to 3 tabs. If pain after 1 tab, seek help. Indications: chest pain 25 Tab 0    levothyroxine (SYNTHROID) 150 mcg tablet Take  by mouth Daily (before breakfast).          Allergies   Allergen Reactions    Rosuvastatin Myalgia             SOCIAL HISTORY:     Social History     Tobacco Use    Smoking status: Former Smoker     Packs/day: 2.00     Years: 38.00     Pack years: 76.00     Quit date:      Years since quittin.3  Smokeless tobacco: Former User     Quit date: 4/6/1980    Tobacco comment: Started at age 12 or 16   Substance Use Topics    Alcohol use: Yes     Alcohol/week: 2.0 - 3.0 standard drinks     Types: 2 - 3 Cans of beer per week     Comment: twice a week when eating out     Drug use: No           FAMILY HISTORY:     Family History   Problem Relation Age of Onset    Thyroid Disease Mother     Heart Attack Father     Heart Disease Father         cabg  x4 vessel    Hypertension Father     Anesth Problems Neg Hx               REVIEW OF SYMPTOMS:      Review of Symptoms:  Constitutional: Negative for fever, chills  HEENT: Negative for nosebleeds, tinnitus, and vision changes. Respiratory: Negative for cough, wheezing  Cardiovascular: Negative for orthopnea, claudication, syncope, and PND. Gastrointestinal: Negative for abdominal pain, diarrhea, melena. Genitourinary: Negative for dysuria  Musculoskeletal: Negative for myalgias. Skin: Negative for rash  Heme: No problems bleeding. Neurological: Negative for speech change and focal weakness.            PHYSICAL EXAM:      Physical Exam:     Visit Vitals  /86 (BP 1 Location: Left upper arm, BP Patient Position: Sitting, BP Cuff Size: Large adult)   Pulse 88   Ht 5' 11\" (1.803 m)   Wt 262 lb (118.8 kg)   SpO2 96%   BMI 36.54 kg/m²          Patient appears generally well, mood and affect are appropriate and pleasant. HEENT:  Hearing intact, non-icteric, normocephalic, atraumatic. Neck Exam: Supple, No JVD or carotid bruits. Lung Exam: Clear to auscultation, even breath sounds. Cardiac Exam: Regular rate and rhythm with no murmur  Abdomen: Soft, non-tender, normal bowel sounds. Extremities: Moves all ext well. No lower extremity edema.   Psych: Appropriate affect  Neuro - Grossly intact           LABS / OTHER STUDIES:      Lab Results   Component Value Date/Time    Sodium 140 01/01/2020 04:46 PM    Potassium 4.2 01/01/2020 04:46 PM    Chloride 104 01/01/2020 04:46 PM    CO2 26 01/01/2020 04:46 PM    Anion gap 10 01/01/2020 04:46 PM    Glucose 135 (H) 01/01/2020 04:46 PM    BUN 20 01/01/2020 04:46 PM    Creatinine 1.07 01/01/2020 04:46 PM    BUN/Creatinine ratio 19 01/01/2020 04:46 PM    GFR est AA >60 01/01/2020 04:46 PM    GFR est non-AA >60 01/01/2020 04:46 PM    Calcium 9.0 01/01/2020 04:46 PM    Bilirubin, total 0.4 01/01/2020 04:46 PM    Alk. phosphatase 43 (L) 01/01/2020 04:46 PM    Protein, total 7.4 01/01/2020 04:46 PM    Albumin 4.1 01/01/2020 04:46 PM    Globulin 3.3 01/01/2020 04:46 PM    A-G Ratio 1.2 01/01/2020 04:46 PM    ALT (SGPT) 58 01/01/2020 04:46 PM    AST (SGOT) 28 01/01/2020 04:46 PM     Lab Results   Component Value Date/Time    WBC 4.0 (L) 01/01/2020 04:46 PM    HGB 13.3 01/01/2020 04:46 PM    HCT 38.6 01/01/2020 04:46 PM    PLATELET 820 11/92/8921 04:46 PM    MCV 88.3 01/01/2020 04:46 PM       Lab Results   Component Value Date/Time    Cholesterol, total 205 (H) 09/30/2014 03:48 AM    HDL Cholesterol 31 09/30/2014 03:48 AM    LDL,Direct 148 (H) 09/30/2014 03:48 AM    LDL, calculated Not calculated due to elevated triglyceride level 09/30/2014 03:48 AM    VLDL, calculated  09/30/2014 03:48 AM     Calculation not valid with this patient's other Lipid values. Triglyceride 406 (H) 09/30/2014 03:48 AM    CHOL/HDL Ratio 6.6 (H) 09/30/2014 03:48 AM     Lab Results   Component Value Date/Time    TSH 5.40 (H) 10/01/2014 03:44 AM         Labs 4/9/21 reviewed:   , LDL-C 136, HDL 31, , LDL-P 2597, small LDL -P 2006  HgbA1c 6.0%, IR 87, CMP and CBC without sig abn, TSH 11.10                CARDIAC DIAGNOSTICS:      Cardiac Evaluation Includes:     s/p cath on 9/30/14 with 80% prox/mid LAD lesion      Cath 12/5/18 - L Main: Short/med; Nml.    LAD: Medium;  Prox stent patent; Mid /Distal 40% lesion  LCflex: Medium size; Ostial/Prox 20%; OM1 - 60% (tandem lesions)  RI -  Small to med; Ostial 60-70%;  Mid tandem 70% lesions  RCA: Dominant ; Small to Med; Prox  90%; Mid 80%; PDA and PLB - small  - MLI  LVEDP: 13 mm Hg  LVEF:Not assessed  No significant gradient across aortic valve. PCI to RCA with ANNA x 2       Echo 12/6/18 - LVEF 55%, 1+ MR           EKG 12/3/18 - NSR, mild anterior TWI - similar to 2017 EKG   EKG 8/9/19 - NSR, normal   EKG 8/7/20 - NSR, non-specific T wave abn            ASSESSMENT AND PLAN:      Assessment and Plan:  1) CAD  - s/p cath on 9/30/14 with 80% prox/mid LAD lesion --> PCI  - He went to ER on 12/3/18 with unstable angina with exertional chest pain (class III angina)  - Proceeded to urgent cardiac cath 12/5/18 and had 2 ANNA to RCA lesion   - On 4/22/21 - has had more COLON lately --> check an exercise cardiolite and echo   - cont ASA and statin (for now, see below) -- > will adjust meds after his stress test to add antianginals if needed      2) HTN  - BP OK - cont low dose losartan (has had dizziness issues in past on BP meds)      3) Dyslipidemia  - Labs 4/9/21 reviewed:  , LDL-C 136, HDL 31, , LDL-P 2597, small LDL -P 2006. HgbA1c 6.0%, IR 87, CMP and CBC without sig abn, TSH 11.10  - As of 4/21 -  Pt off meds x1 yr. Cholesterol quite elevated - Intolerant to lipitor, crestor and pravastatin .  ---> Will stop pravastatin and start Repatha (reviewed risks) - follow labs     4) Hypothyroid   - per PCP      5) Fatigue   - he sleeps poorly   - discussed a sleep study ---> will check a sleep study   - TSH 11 ---> asked him to speak to PCP     6) See NP in 8 weeks.  Patient expressed understanding of the plan - questions were answered.      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  Grew up in [place] (moved here in 2012).  Lives in [address], [address]  [address] [facility], [address] [facility] [address]      [address] [facility]  [address]  Gordon Chi, [address] [place] [address]  Ph: [phone]                                [phone]       ADDENDUM   4/27/2021    Exercise Cardiolite 4/26/21 - Walked 7 min (8.9 METS). Borderline ischemic EKG changes with 0.5-1.0 mm horizontal ST depression in the inferolateral leads. No chest pain. Normal MPI. LVEF 59%    Will call       ADDENDUM   4/28/2021  I called with normal results. He has COLON walking steps but he is able to get through. He has a long smoking history (30+ years). Most of the time he is fine. Will refer to Pulmonary for SOB.

## 2021-04-26 ENCOUNTER — ANCILLARY PROCEDURE (OUTPATIENT)
Dept: CARDIOLOGY CLINIC | Age: 56
End: 2021-04-26
Payer: COMMERCIAL

## 2021-04-26 ENCOUNTER — DOCUMENTATION ONLY (OUTPATIENT)
Dept: CARDIOLOGY CLINIC | Age: 56
End: 2021-04-26

## 2021-04-26 VITALS — BODY MASS INDEX: 36.82 KG/M2 | WEIGHT: 263 LBS | HEIGHT: 71 IN

## 2021-04-26 DIAGNOSIS — I25.10 CORONARY ARTERY DISEASE INVOLVING NATIVE CORONARY ARTERY OF NATIVE HEART WITHOUT ANGINA PECTORIS: ICD-10-CM

## 2021-04-26 PROCEDURE — A9500 TC99M SESTAMIBI: HCPCS | Performed by: SPECIALIST

## 2021-04-26 PROCEDURE — 78451 HT MUSCLE IMAGE SPECT SING: CPT | Performed by: SPECIALIST

## 2021-04-26 RX ORDER — TETRAKIS(2-METHOXYISOBUTYLISOCYANIDE)COPPER(I) TETRAFLUOROBORATE 1 MG/ML
40 INJECTION, POWDER, LYOPHILIZED, FOR SOLUTION INTRAVENOUS ONCE
Status: COMPLETED | OUTPATIENT
Start: 2021-04-26 | End: 2021-04-26

## 2021-04-26 RX ADMIN — TETRAKIS(2-METHOXYISOBUTYLISOCYANIDE)COPPER(I) TETRAFLUOROBORATE 26.4 MILLICURIE: 1 INJECTION, POWDER, LYOPHILIZED, FOR SOLUTION INTRAVENOUS at 13:20

## 2021-04-27 LAB
STRESS ANGINA INDEX: 0
STRESS BASELINE DIAS BP: 86 MMHG
STRESS BASELINE HR: 82 BPM
STRESS BASELINE SYS BP: 134 MMHG
STRESS ESTIMATED WORKLOAD: 8.9 METS
STRESS EXERCISE DUR MIN: NORMAL MIN:SEC
STRESS O2 SAT PEAK: 98 %
STRESS O2 SAT REST: 97 %
STRESS PEAK DIAS BP: 58 MMHG
STRESS PEAK SYS BP: 212 MMHG
STRESS PERCENT HR ACHIEVED: 92 %
STRESS POST PEAK HR: 151 BPM
STRESS RATE PRESSURE PRODUCT: NORMAL BPM*MMHG
STRESS SR DUKE TREADMILL SCORE: 2
STRESS ST DEPRESSION: 1 MM
STRESS TARGET HR: 165 BPM

## 2021-04-27 NOTE — PROGRESS NOTES
PA submitted on [email or web address]    Key: [id number]    Med: Repatha    Approved from 4/26/21-10/23/21

## 2021-04-29 ENCOUNTER — TELEPHONE (OUTPATIENT)
Dept: CARDIOLOGY CLINIC | Age: 56
End: 2021-04-29

## 2021-04-29 DIAGNOSIS — R06.83 SNORING: Primary | ICD-10-CM

## 2021-04-29 DIAGNOSIS — R06.02 SOB (SHORTNESS OF BREATH): ICD-10-CM

## 2021-05-16 ENCOUNTER — HOSPITAL ENCOUNTER (EMERGENCY)
Age: 56
Discharge: HOME | End: 2021-05-16
Attending: EMERGENCY MEDICINE
Payer: COMMERCIAL

## 2021-05-16 VITALS
RESPIRATION RATE: 20 BRPM | OXYGEN SATURATION: 97 % | DIASTOLIC BLOOD PRESSURE: 62 MMHG | HEIGHT: 70 IN | BODY MASS INDEX: 38.35 KG/M2 | SYSTOLIC BLOOD PRESSURE: 156 MMHG | WEIGHT: 267.86 LBS | HEART RATE: 101 BPM | TEMPERATURE: 98 F

## 2021-05-16 DIAGNOSIS — G89.29 CHRONIC RIGHT-SIDED LOW BACK PAIN WITHOUT SCIATICA: ICD-10-CM

## 2021-05-16 DIAGNOSIS — M54.50 CHRONIC RIGHT-SIDED LOW BACK PAIN WITHOUT SCIATICA: ICD-10-CM

## 2021-05-16 DIAGNOSIS — M79.10 MYALGIA: Primary | ICD-10-CM

## 2021-05-16 LAB
ANION GAP SERPL CALC-SCNC: 10 MMOL/L (ref 5–15)
APPEARANCE UR: CLEAR
BACTERIA URNS QL MICRO: NEGATIVE /HPF
BASOPHILS # BLD: 0 K/UL (ref 0–0.1)
BASOPHILS NFR BLD: 0 % (ref 0–1)
BILIRUB UR QL: NEGATIVE
BUN SERPL-MCNC: 20 MG/DL (ref 6–20)
BUN/CREAT SERPL: 17 (ref 12–20)
CALCIUM SERPL-MCNC: 8.1 MG/DL (ref 8.5–10.1)
CHLORIDE SERPL-SCNC: 103 MMOL/L (ref 97–108)
CK SERPL-CCNC: 76 U/L (ref 39–308)
CO2 SERPL-SCNC: 26 MMOL/L (ref 21–32)
COLOR UR: ABNORMAL
CREAT SERPL-MCNC: 1.17 MG/DL (ref 0.7–1.3)
CRP SERPL-MCNC: 1.46 MG/DL (ref 0–0.6)
DIFFERENTIAL METHOD BLD: ABNORMAL
EOSINOPHIL # BLD: 0.1 K/UL (ref 0–0.4)
EOSINOPHIL NFR BLD: 2 % (ref 0–7)
EPITH CASTS URNS QL MICRO: ABNORMAL /LPF
ERYTHROCYTE [DISTWIDTH] IN BLOOD BY AUTOMATED COUNT: 12.5 % (ref 11.5–14.5)
ERYTHROCYTE [SEDIMENTATION RATE] IN BLOOD: 9 MM/HR (ref 0–20)
GLUCOSE SERPL-MCNC: 152 MG/DL (ref 65–100)
GLUCOSE UR STRIP.AUTO-MCNC: NEGATIVE MG/DL
HCT VFR BLD AUTO: 39 % (ref 36.6–50.3)
HGB BLD-MCNC: 13.5 G/DL (ref 12.1–17)
HGB UR QL STRIP: ABNORMAL
IMM GRANULOCYTES # BLD AUTO: 0 K/UL
IMM GRANULOCYTES NFR BLD AUTO: 0 %
KETONES UR QL STRIP.AUTO: NEGATIVE MG/DL
LEUKOCYTE ESTERASE UR QL STRIP.AUTO: NEGATIVE
LYMPHOCYTES # BLD: 0.4 K/UL (ref 0.8–3.5)
LYMPHOCYTES NFR BLD: 6 % (ref 12–49)
MCH RBC QN AUTO: 30.5 PG (ref 26–34)
MCHC RBC AUTO-ENTMCNC: 34.6 G/DL (ref 30–36.5)
MCV RBC AUTO: 88.2 FL (ref 80–99)
MONOCYTES # BLD: 0.3 K/UL (ref 0–1)
MONOCYTES NFR BLD: 4 % (ref 5–13)
NEUTS BAND NFR BLD MANUAL: 21 % (ref 0–6)
NEUTS SEG # BLD: 5.9 K/UL (ref 1.8–8)
NEUTS SEG NFR BLD: 67 % (ref 32–75)
NITRITE UR QL STRIP.AUTO: NEGATIVE
NRBC # BLD: 0 K/UL (ref 0–0.01)
NRBC BLD-RTO: 0 PER 100 WBC
PH UR STRIP: 6 [PH] (ref 5–8)
PLATELET # BLD AUTO: 171 K/UL (ref 150–400)
PMV BLD AUTO: 9.6 FL (ref 8.9–12.9)
POTASSIUM SERPL-SCNC: 4.1 MMOL/L (ref 3.5–5.1)
PROT UR STRIP-MCNC: ABNORMAL MG/DL
RBC # BLD AUTO: 4.42 M/UL (ref 4.1–5.7)
RBC #/AREA URNS HPF: ABNORMAL /HPF (ref 0–5)
RBC MORPH BLD: ABNORMAL
SODIUM SERPL-SCNC: 139 MMOL/L (ref 136–145)
SP GR UR REFRACTOMETRY: 1.02 (ref 1–1.03)
UR CULT HOLD, URHOLD: NORMAL
UROBILINOGEN UR QL STRIP.AUTO: 0.2 EU/DL (ref 0.2–1)
WBC # BLD AUTO: 6.7 K/UL (ref 4.1–11.1)
WBC URNS QL MICRO: ABNORMAL /HPF (ref 0–4)

## 2021-05-16 PROCEDURE — 74011250636 HC RX REV CODE- 250/636: Performed by: EMERGENCY MEDICINE

## 2021-05-16 PROCEDURE — 81001 URINALYSIS AUTO W/SCOPE: CPT

## 2021-05-16 PROCEDURE — 96374 THER/PROPH/DIAG INJ IV PUSH: CPT

## 2021-05-16 PROCEDURE — 74011250637 HC RX REV CODE- 250/637: Performed by: EMERGENCY MEDICINE

## 2021-05-16 PROCEDURE — 99283 EMERGENCY DEPT VISIT LOW MDM: CPT

## 2021-05-16 PROCEDURE — 36415 COLL VENOUS BLD VENIPUNCTURE: CPT

## 2021-05-16 PROCEDURE — 82550 ASSAY OF CK (CPK): CPT

## 2021-05-16 PROCEDURE — 86140 C-REACTIVE PROTEIN: CPT

## 2021-05-16 PROCEDURE — 85025 COMPLETE CBC W/AUTO DIFF WBC: CPT

## 2021-05-16 PROCEDURE — 85652 RBC SED RATE AUTOMATED: CPT

## 2021-05-16 PROCEDURE — 80048 BASIC METABOLIC PNL TOTAL CA: CPT

## 2021-05-16 RX ORDER — KETOROLAC TROMETHAMINE 30 MG/ML
30 INJECTION, SOLUTION INTRAMUSCULAR; INTRAVENOUS
Status: COMPLETED | OUTPATIENT
Start: 2021-05-16 | End: 2021-05-16

## 2021-05-16 RX ORDER — HYDROCODONE BITARTRATE AND ACETAMINOPHEN 5; 325 MG/1; MG/1
1 TABLET ORAL
Status: COMPLETED | OUTPATIENT
Start: 2021-05-16 | End: 2021-05-16

## 2021-05-16 RX ADMIN — KETOROLAC TROMETHAMINE 30 MG: 30 INJECTION, SOLUTION INTRAMUSCULAR; INTRAVENOUS at 01:25

## 2021-05-16 RX ADMIN — SODIUM CHLORIDE 1000 ML: 9 INJECTION, SOLUTION INTRAVENOUS at 01:25

## 2021-05-16 RX ADMIN — HYDROCODONE BITARTRATE AND ACETAMINOPHEN 1 TABLET: 5; 325 TABLET ORAL at 02:31

## 2021-05-16 NOTE — ED TRIAGE NOTES
Patient complains of low back pain and generalized body pain that started last night. Patient took Benadryl yesterday for symptoms.

## 2021-05-16 NOTE — ED NOTES
Medicated with [place] prior to discharge. Stated his pain had started to come back. Discharged home with spouse, fully awake and alert, no signs of distress. Verbalized understanding of instructions. Left for home in no acute distress.

## 2021-05-16 NOTE — ED PROVIDER NOTES
51-year-old male with a history of GERD, CAD, hypothyroidism, tonsillar cancer, dyslipidemia presents with lower back pain and myalgias. States that it started suddenly last night. He was visiting [place] at the time and was not having any pain until it started suddenly. He rates his pain 5 out of 10 currently. States that it is worse in the right lower back. He has been taking statins but he was taken off of those recently due to \"body pain\". He now has an injectable evolocumab. He denies any fever, chills, nausea, vomiting. On review of up-to-date: Injectable has a 4% rate of myalgias.            Past Medical History:   Diagnosis Date    Arthritis     ankles, feet, hands    CAD (coronary artery disease)     s/p cath on 9/30/14 with 80% prox/mid LAD lesion;  12/18 --> PCI to RCA (2 ANNA)    Cancer ([facility] [address]     tonsil cancer    Dyslipidemia     GERD (gastroesophageal reflux disease)     Hypertension     Hypothyroidism     Psychiatric disorder     depression    Seizures ([facility] [address] 1983       Past Surgical History:   Procedure Laterality Date    HX HEENT      tonsillectomy for malignant tumor    HX OTHER SURGICAL      tonsil cancer (removed)    OR CARDIAC SURG PROCEDURE UNLIST  2015    stent         Family History:   Problem Relation Age of Onset    Thyroid Disease Mother     Heart Attack Father     Heart Disease Father         cabg  x4 vessel    Hypertension Father     Anesth Problems Neg Hx        Social History     Socioeconomic History    Marital status:      Spouse name: Not on file    Number of children: 3    Years of education: Not on file    Highest education level: Not on file   Occupational History    Occupation: RN     Employer: [employer]     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   Social Needs    Financial resource strain: Not hard at all   Brodie-Joanna insecurity     Worry: Not on file     Inability: Not on file   [employer] needs     Medical: Not on file     Non-medical: Not on file   Tobacco Use  Smoking status: Former Smoker     Packs/day: 2.00     Years: 38.00     Pack years: 76.00     Quit date:      Years since quittin.3    Smokeless tobacco: Former User     Quit date: 1980    Tobacco comment: Started at age 12 or 16   Substance and Sexual Activity    Alcohol use: Yes     Alcohol/week: 2.0 - 3.0 standard drinks     Types: 2 - 3 Cans of beer per week     Comment: twice a week when eating out     Drug use: No    Sexual activity: Yes     Partners: Female   Lifestyle    Physical activity     Days per week: Not on file     Minutes per session: Not on file    Stress: Not on file   Relationships    Social connections     Talks on phone: Not on file     Gets together: Not on file     Attends Religion service: Not on file     Active member of club or organization: Not on file     Attends meetings of clubs or organizations: Not on file     Relationship status: Not on file    Intimate partner violence     Fear of current or ex partner: Not on file     Emotionally abused: Not on file     Physically abused: Not on file     Forced sexual activity: Not on file   Other Topics Concern     Service Not Asked    Blood Transfusions Not Asked    Caffeine Concern Not Asked    Occupational Exposure Not Asked   Marnell Venus Hazards Not Asked    Sleep Concern Not Asked    Stress Concern Not Asked    Weight Concern Not Asked    Special Diet Not Asked    Back Care Not Asked    Exercise Not Asked    Bike Helmet Not Asked    [address] Not Asked    Self-Exams Not Asked   Social History Narrative    Not on file         ALLERGIES: Rosuvastatin    Review of Systems   All other systems reviewed and are negative. Vitals:    21 0059   BP: 128/80   Pulse: (!) 112   Resp: 20   Temp: 98 °F (36.7 °C)   SpO2: 98%   Weight: 121.5 kg (267 lb 13.7 oz)   Height: 5' 10\" (1.778 m)            Physical Exam  Vitals signs and nursing note reviewed.    Constitutional:       General: He is not in acute distress. Appearance: He is obese. HENT:      Head: Normocephalic and atraumatic. Eyes:      General: No scleral icterus. Conjunctiva/sclera: Conjunctivae normal.      Pupils: Pupils are equal, round, and reactive to light. Neck:      Musculoskeletal: No neck rigidity. Trachea: No tracheal deviation. Cardiovascular:      Rate and Rhythm: Normal rate and regular rhythm. Pulmonary:      Effort: Pulmonary effort is normal. No respiratory distress. Breath sounds: Normal breath sounds. No stridor. Abdominal:      General: There is no distension. Palpations: Abdomen is soft. Tenderness: There is no abdominal tenderness. Comments: Protuberant   Genitourinary:     Comments: deferred  Musculoskeletal:         General: No swelling, tenderness or deformity. Skin:     General: Skin is warm and dry. Neurological:      General: No focal deficit present. Mental Status: He is alert and oriented to person, place, and time. Cranial Nerves: No cranial nerve deficit. Sensory: No sensory deficit. Motor: No weakness. Gait: Gait normal.   Psychiatric:         Mood and Affect: Mood normal.         Behavior: Behavior normal.          MDM       Procedures        2:16 AM  Patient re-evaluated. Symptoms improved. All questions answered. Patient appropriate for discharge. Given return precautions and follow up instructions. LABORATORY TESTS:  Labs Reviewed   CBC WITH AUTOMATED DIFF - Abnormal; Notable for the following components:       Result Value    BAND NEUTROPHILS 21 (*)     LYMPHOCYTES 6 (*)     MONOCYTES 4 (*)     ABS.  LYMPHOCYTES 0.4 (*)     All other components within normal limits   METABOLIC PANEL, BASIC - Abnormal; Notable for the following components:    Glucose 152 (*)     Calcium 8.1 (*)     All other components within normal limits   C REACTIVE PROTEIN, QT - Abnormal; Notable for the following components:    C-Reactive protein 1.46 (*)     All other components within normal limits   URINALYSIS W/MICROSCOPIC - Abnormal; Notable for the following components:    Protein TRACE (*)     Blood MODERATE (*)     All other components within normal limits   URINE CULTURE HOLD SAMPLE   CK   SED RATE (ESR)       IMAGING RESULTS:  No orders to display       MEDICATIONS GIVEN:  Medications   sodium chloride 0.9 % bolus infusion 1,000 mL (1,000 mL IntraVENous New Bag 5/16/21 0125)   ketorolac (TORADOL) injection 30 mg (30 mg IntraVENous Given 5/16/21 0125)       IMPRESSION:  1. Myalgia    2. Chronic right-sided low back pain without sciatica        PLAN:  1. Current Discharge Medication List        2. Follow-up Information     Follow up With Specialties Details Why Contact Info    Theresa Silva DO Rheumatology Schedule an appointment as soon as possible for a visit   [address] [place] [address] [phone]      Raul De Oliveira NP Nurse Practitioner Schedule an appointment as soon as possible for a visit   [address] [phone]      [facility] EMERGENCY DEPT Emergency Medicine  If symptoms worsen or new concerns [facility] [id number] [phone]        3. Return to ED for new or worsening symptoms       Mayra Hernandez.  Andreina Munroe MD

## 2021-05-19 ENCOUNTER — OFFICE VISIT (OUTPATIENT)
Dept: SLEEP MEDICINE | Age: 56
End: 2021-05-19
Payer: COMMERCIAL

## 2021-05-19 VITALS
WEIGHT: 262.3 LBS | SYSTOLIC BLOOD PRESSURE: 139 MMHG | BODY MASS INDEX: 37.55 KG/M2 | HEIGHT: 70 IN | HEART RATE: 80 BPM | RESPIRATION RATE: 16 BRPM | DIASTOLIC BLOOD PRESSURE: 82 MMHG | TEMPERATURE: 98 F | OXYGEN SATURATION: 97 %

## 2021-05-19 DIAGNOSIS — G47.33 OSA (OBSTRUCTIVE SLEEP APNEA): Primary | ICD-10-CM

## 2021-05-19 PROCEDURE — 99204 OFFICE O/P NEW MOD 45 MIN: CPT | Performed by: SPECIALIST

## 2021-05-19 RX ORDER — LEVOTHYROXINE SODIUM 175 UG/1
TABLET ORAL
COMMUNITY
Start: 2021-05-04 | End: 2021-09-01

## 2021-05-19 NOTE — PROGRESS NOTES
[address] [facility] [facility], [address]  Tel.  [phone]  Fax. [address] [facility] [address]  [place], [address] [facility]  Tel.  [phone]  Fax. [phone] [address]   Tel.  [phone]  Fax. [phone]       Chief Complaint       Chief Complaint   Patient presents with    Sleep Problem     new patient -referred by Dr. Philly Nice for frequent awakenings and daytime sleepiness       HPI      Leatha Barrios is 64 y.o. male seen for evaluation of a sleep disorder. Patient notes a history of snoring and nocturnal awakening. Currently he retires at 9 PM and awakens at 3 AM.  He will awaken 4 times during the night. He notes that he is \"easily tired\". He has been told of snoring which is more prominent when supine; also has been told of apparent leg twitching. He notes that he is fatigued during the day and may doze if he is seated and inactive such as when reading, watching TV riding as a passenger or seated quietly after lunch. He was evaluated several years ago with a home sleep test which did not demonstrate significant sleep disordered breathing. Snoring was noted. [place] Sleepiness Score: 14       Allergies   Allergen Reactions    Rosuvastatin Myalgia       Current Outpatient Medications   Medication Sig Dispense Refill    evolocumab (REPATHA SURECLICK) pen injection 1 mL by SubCUTAneous route every fourteen (14) days. 7 Pen 5    losartan (COZAAR) 50 mg tablet Take 1 Tab by mouth daily. 30 Tab 0    aspirin delayed-release 81 mg tablet Take 1 Tab by mouth daily. 30 Tab 11    nitroglycerin (NITROSTAT) 0.4 mg SL tablet 1 Tab by SubLINGual route every five (5) minutes as needed for Chest Pain. Up to 3 tabs. If pain after 1 tab, seek help.   Indications: chest pain 25 Tab 0    levothyroxine (SYNTHROID) 175 mcg tablet TAKE 1 TABLET BY MOUTH ONCE DAILY FOR 90 DAYS      pravastatin (PRAVACHOL) 40 mg tablet Take 1 Tab by mouth nightly. 30 Tab 0    levothyroxine (SYNTHROID) 150 mcg tablet Take  by mouth Daily (before breakfast). He  has a past medical history of Arthritis, CAD (coronary artery disease), Cancer ([facility] [address] Dyslipidemia, GERD (gastroesophageal reflux disease), Hypertension, Hypothyroidism, Psychiatric disorder, and Seizures ([facility] [address] (1983). He  has a past surgical history that includes pr cardiac surg procedure unlist (2015); hx other surgical; and hx heent. He family history includes Heart Attack in his father; Heart Disease in his father; Hypertension in his father; Thyroid Disease in his mother. He  reports that he quit smoking about 4 years ago. He has a 76.00 pack-year smoking history. He quit smokeless tobacco use about 41 years ago. He reports current alcohol use of about 2.0 - 3.0 standard drinks of alcohol per week. He reports that he does not use drugs. Review of Systems:  ROS      Objective:     Visit Vitals  /82 (BP 1 Location: Left arm, BP Patient Position: At rest, BP Cuff Size: Adult long)   Pulse 80   Temp 98 °F (36.7 °C) (Temporal)   Resp 16   Ht 5' 10\" (1.778 m)   Wt 262 lb 4.8 oz (119 kg)   SpO2 97%   BMI 37.64 kg/m²     Body mass index is 37.64 kg/m². General:   Conversant, cooperative   Eyes:  Pupils equal and reactive, no nystagmus   Oropharynx:   Mallampati score III , asymmetrical posterior soft palate, patient has history of tonsillar CA,, tongue scalloped       Neck:   No carotid bruits; Neck circ. in \"inches\": 17   Chest/Lungs:  Clear on auscultation    CVS:  Normal rate, regular rhythm   Skin:  Warm to touch; no obvious rashes   Neuro:  Speech fluent, face symmetrical, tongue movement normal   Psych:  Normal affect,  normal countenance        Assessment:       ICD-10-CM ICD-9-CM    1. BINA (obstructive sleep apnea)  G47.33 327.23        History is consistent with sleep disordered breathing.   He will be evaluated with a home sleep test.  In lab study will be obtained if HSAT unremarkable. Plan:     No orders of the defined types were placed in this encounter. * Patient has a history and examination consistent with the diagnosis of sleep apnea. *Home sleep testing was ordered for initial evaluation. * He was provided information on sleep apnea including corresponding risk factors and the importance of proper treatment. * Treatment options if indicated were reviewed today. Instructions:  o The patient would benefit from weight reduction measures. o Do not engage in activities requiring a normal degree of alertness if fatigue is present. o The patient understands that untreated or undertreated sleep apnea could impair judgement and the ability to function normally during the day.  o Call or return if symptoms worsen or persist.          Derek García MD, FAASM  Electronically signed 05/19/21       This note was created using voice recognition software. Despite editing, there may be syntax errors. This note will not be viewable in [address]

## 2021-06-02 ENCOUNTER — OFFICE VISIT (OUTPATIENT)
Dept: SLEEP MEDICINE | Age: 56
End: 2021-06-02

## 2021-06-02 DIAGNOSIS — G47.33 OSA (OBSTRUCTIVE SLEEP APNEA): Primary | ICD-10-CM

## 2021-06-02 NOTE — PROGRESS NOTES
[address] [facility], [address]  Tel.  [phone]  Fax. 100 [facility] [address]  [place], [address] [facility]  Tel.  [phone]  Fax. [phone] [address] [facility] [place] [address]  Tel.  [phone]  Fax. [phone]       S>Wallace Ramírez is a 64 y.o. male seen today to receive a home sleep testing unit (HST). · Patient was educated on proper hookup and operation of the HST. · Instruction forms and documentation were reviewed and signed. · The patient demonstrated good understanding of the HST.    O>    There were no vitals taken for this visit. A>  No diagnosis found. P>  · General information regarding operations and maintenance of the device was provided. · He was provided information on sleep apnea including coresponding risk factors and the importance of proper treatment. · Follow-up appointment was made to return the HST. He will be contacted once the results have been reviewed. · He was asked to contact our office for any problems regarding his home sleep test study.   · [id number]

## 2021-06-28 ENCOUNTER — TELEPHONE (OUTPATIENT)
Dept: SLEEP MEDICINE | Age: 56
End: 2021-06-28

## 2021-06-28 DIAGNOSIS — G47.33 OSA (OBSTRUCTIVE SLEEP APNEA): Primary | ICD-10-CM

## 2021-06-28 NOTE — TELEPHONE ENCOUNTER
HSAT demonstrated AHI normal at 4/h associated with minimal SaO2 88%. Snoring during 0.9% of the study. Patient reported [place] Sleepiness Scale of 14. HSAT potentially underestimated sleep apnea severity. Recommendation: PSG. Sleep technologist: Please review the HSAT study with the patient. Order has been entered for PSG.

## 2021-08-31 ENCOUNTER — APPOINTMENT (OUTPATIENT)
Dept: GENERAL RADIOLOGY | Age: 56
DRG: 177 | End: 2021-08-31
Attending: EMERGENCY MEDICINE
Payer: COMMERCIAL

## 2021-08-31 PROCEDURE — 71045 X-RAY EXAM CHEST 1 VIEW: CPT

## 2021-08-31 PROCEDURE — 99285 EMERGENCY DEPT VISIT HI MDM: CPT

## 2021-08-31 RX ORDER — GUAIFENESIN/DEXTROMETHORPHAN 100-10MG/5
5 SYRUP ORAL
Status: DISCONTINUED | OUTPATIENT
Start: 2021-08-31 | End: 2021-09-05 | Stop reason: HOSPADM

## 2021-08-31 RX ORDER — ACETAMINOPHEN 650 MG/1
650 SUPPOSITORY RECTAL
Status: DISCONTINUED | OUTPATIENT
Start: 2021-08-31 | End: 2021-09-05 | Stop reason: HOSPADM

## 2021-08-31 RX ORDER — ACETAMINOPHEN 325 MG/1
650 TABLET ORAL
Status: DISCONTINUED | OUTPATIENT
Start: 2021-08-31 | End: 2021-09-05 | Stop reason: HOSPADM

## 2021-08-31 NOTE — ED TRIAGE NOTES
Triage: Pt tested positive for covid 6 days ago. [facility].

## 2021-09-01 ENCOUNTER — HOSPITAL ENCOUNTER (INPATIENT)
Age: 56
LOS: 4 days | Discharge: HOME | DRG: 177 | End: 2021-09-05
Attending: EMERGENCY MEDICINE | Admitting: INTERNAL MEDICINE
Payer: COMMERCIAL

## 2021-09-01 DIAGNOSIS — R09.02 HYPOXIA: Primary | ICD-10-CM

## 2021-09-01 DIAGNOSIS — U07.1 COVID-19: ICD-10-CM

## 2021-09-01 DIAGNOSIS — R00.0 TACHYCARDIA: ICD-10-CM

## 2021-09-01 DIAGNOSIS — R06.09 DOE (DYSPNEA ON EXERTION): ICD-10-CM

## 2021-09-01 PROBLEM — J12.82 PNEUMONIA DUE TO COVID-19 VIRUS: Status: ACTIVE | Noted: 2021-09-01

## 2021-09-01 LAB
25(OH)D3 SERPL-MCNC: 25.8 NG/ML (ref 30–100)
ALBUMIN SERPL-MCNC: 3.4 G/DL (ref 3.5–5)
ALBUMIN/GLOB SERPL: 0.7 {RATIO} (ref 1.1–2.2)
ALP SERPL-CCNC: 54 U/L (ref 45–117)
ALT SERPL-CCNC: 35 U/L (ref 12–78)
ANION GAP SERPL CALC-SCNC: 6 MMOL/L (ref 5–15)
APTT PPP: 26 SEC (ref 22.1–31)
AST SERPL-CCNC: 16 U/L (ref 15–37)
BASOPHILS # BLD: 0 K/UL (ref 0–0.1)
BASOPHILS NFR BLD: 0 % (ref 0–1)
BILIRUB SERPL-MCNC: 0.3 MG/DL (ref 0.2–1)
BUN SERPL-MCNC: 17 MG/DL (ref 6–20)
BUN/CREAT SERPL: 16 (ref 12–20)
CALCIUM SERPL-MCNC: 8.9 MG/DL (ref 8.5–10.1)
CHLORIDE SERPL-SCNC: 100 MMOL/L (ref 97–108)
CO2 SERPL-SCNC: 30 MMOL/L (ref 21–32)
COMMENT, HOLDF: NORMAL
CREAT SERPL-MCNC: 1.08 MG/DL (ref 0.7–1.3)
CRP SERPL-MCNC: 33.2 MG/DL (ref 0–0.6)
CRP SERPL-MCNC: 34 MG/DL (ref 0–0.6)
D DIMER PPP FEU-MCNC: 0.41 MG/L FEU (ref 0–0.65)
DIFFERENTIAL METHOD BLD: ABNORMAL
EOSINOPHIL # BLD: 0 K/UL (ref 0–0.4)
EOSINOPHIL NFR BLD: 0 % (ref 0–7)
ERYTHROCYTE [DISTWIDTH] IN BLOOD BY AUTOMATED COUNT: 12.3 % (ref 11.5–14.5)
FERRITIN SERPL-MCNC: 648 NG/ML (ref 26–388)
FERRITIN SERPL-MCNC: 649 NG/ML (ref 26–388)
FIBRINOGEN PPP-MCNC: >800 MG/DL (ref 200–475)
GLOBULIN SER CALC-MCNC: 4.6 G/DL (ref 2–4)
GLUCOSE SERPL-MCNC: 116 MG/DL (ref 65–100)
HCT VFR BLD AUTO: 35.6 % (ref 36.6–50.3)
HGB BLD-MCNC: 11.8 G/DL (ref 12.1–17)
IMM GRANULOCYTES # BLD AUTO: 0.2 K/UL (ref 0–0.04)
IMM GRANULOCYTES NFR BLD AUTO: 2 % (ref 0–0.5)
INR PPP: 1.1 (ref 0.9–1.1)
LACTATE SERPL-SCNC: 1.4 MMOL/L (ref 0.4–2)
LDH SERPL L TO P-CCNC: 271 U/L (ref 85–241)
LDH SERPL L TO P-CCNC: 309 U/L (ref 85–241)
LYMPHOCYTES # BLD: 1 K/UL (ref 0.8–3.5)
LYMPHOCYTES NFR BLD: 11 % (ref 12–49)
MAGNESIUM SERPL-MCNC: 2 MG/DL (ref 1.6–2.4)
MCH RBC QN AUTO: 29.7 PG (ref 26–34)
MCHC RBC AUTO-ENTMCNC: 33.1 G/DL (ref 30–36.5)
MCV RBC AUTO: 89.7 FL (ref 80–99)
MONOCYTES # BLD: 0.4 K/UL (ref 0–1)
MONOCYTES NFR BLD: 4 % (ref 5–13)
NEUTS SEG # BLD: 7.3 K/UL (ref 1.8–8)
NEUTS SEG NFR BLD: 83 % (ref 32–75)
NRBC # BLD: 0 K/UL (ref 0–0.01)
NRBC BLD-RTO: 0 PER 100 WBC
PLATELET # BLD AUTO: 203 K/UL (ref 150–400)
PMV BLD AUTO: 10 FL (ref 8.9–12.9)
POTASSIUM SERPL-SCNC: 3.8 MMOL/L (ref 3.5–5.1)
PROCALCITONIN SERPL-MCNC: 6.14 NG/ML
PROT SERPL-MCNC: 8 G/DL (ref 6.4–8.2)
PROTHROMBIN TIME: 11.2 SEC (ref 9–11.1)
RBC # BLD AUTO: 3.97 M/UL (ref 4.1–5.7)
SAMPLES BEING HELD,HOLD: NORMAL
SODIUM SERPL-SCNC: 136 MMOL/L (ref 136–145)
THERAPEUTIC RANGE,PTTT: NORMAL SECS (ref 58–77)
TROPONIN I SERPL-MCNC: <0.05 NG/ML
TROPONIN I SERPL-MCNC: <0.05 NG/ML
TSH SERPL DL<=0.05 MIU/L-ACNC: 12.7 UIU/ML (ref 0.36–3.74)
WBC # BLD AUTO: 8.7 K/UL (ref 4.1–11.1)

## 2021-09-01 PROCEDURE — 74011000258 HC RX REV CODE- 258: Performed by: INTERNAL MEDICINE

## 2021-09-01 PROCEDURE — 82728 ASSAY OF FERRITIN: CPT

## 2021-09-01 PROCEDURE — 85384 FIBRINOGEN ACTIVITY: CPT

## 2021-09-01 PROCEDURE — 74011250637 HC RX REV CODE- 250/637: Performed by: INTERNAL MEDICINE

## 2021-09-01 PROCEDURE — 85610 PROTHROMBIN TIME: CPT

## 2021-09-01 PROCEDURE — 85025 COMPLETE CBC W/AUTO DIFF WBC: CPT

## 2021-09-01 PROCEDURE — XW033E5 INTRODUCTION OF REMDESIVIR ANTI-INFECTIVE INTO PERIPHERAL VEIN, PERCUTANEOUS APPROACH, NEW TECHNOLOGY GROUP 5: ICD-10-PCS | Performed by: FAMILY MEDICINE

## 2021-09-01 PROCEDURE — 82306 VITAMIN D 25 HYDROXY: CPT

## 2021-09-01 PROCEDURE — 83615 LACTATE (LD) (LDH) ENZYME: CPT

## 2021-09-01 PROCEDURE — 85730 THROMBOPLASTIN TIME PARTIAL: CPT

## 2021-09-01 PROCEDURE — 65270000029 HC RM PRIVATE

## 2021-09-01 PROCEDURE — 83605 ASSAY OF LACTIC ACID: CPT

## 2021-09-01 PROCEDURE — 86140 C-REACTIVE PROTEIN: CPT

## 2021-09-01 PROCEDURE — 74011250637 HC RX REV CODE- 250/637: Performed by: NURSE PRACTITIONER

## 2021-09-01 PROCEDURE — 2709999900 HC NON-CHARGEABLE SUPPLY

## 2021-09-01 PROCEDURE — 80053 COMPREHEN METABOLIC PANEL: CPT

## 2021-09-01 PROCEDURE — 85379 FIBRIN DEGRADATION QUANT: CPT

## 2021-09-01 PROCEDURE — 83735 ASSAY OF MAGNESIUM: CPT

## 2021-09-01 PROCEDURE — 77010033678 HC OXYGEN DAILY

## 2021-09-01 PROCEDURE — 84443 ASSAY THYROID STIM HORMONE: CPT

## 2021-09-01 PROCEDURE — 36415 COLL VENOUS BLD VENIPUNCTURE: CPT

## 2021-09-01 PROCEDURE — 74011250636 HC RX REV CODE- 250/636: Performed by: INTERNAL MEDICINE

## 2021-09-01 PROCEDURE — 84145 PROCALCITONIN (PCT): CPT

## 2021-09-01 PROCEDURE — 74011000250 HC RX REV CODE- 250: Performed by: INTERNAL MEDICINE

## 2021-09-01 PROCEDURE — 93005 ELECTROCARDIOGRAM TRACING: CPT

## 2021-09-01 PROCEDURE — 84484 ASSAY OF TROPONIN QUANT: CPT

## 2021-09-01 RX ORDER — LEVOTHYROXINE SODIUM 100 UG/1
200 TABLET ORAL
Status: DISCONTINUED | OUTPATIENT
Start: 2021-09-02 | End: 2021-09-05 | Stop reason: HOSPADM

## 2021-09-01 RX ORDER — ONDANSETRON 4 MG/1
4 TABLET, ORALLY DISINTEGRATING ORAL
COMMUNITY

## 2021-09-01 RX ORDER — ASPIRIN 81 MG/1
81 TABLET ORAL DAILY
Status: DISCONTINUED | OUTPATIENT
Start: 2021-09-01 | End: 2021-09-05 | Stop reason: HOSPADM

## 2021-09-01 RX ORDER — HYDRALAZINE HYDROCHLORIDE 20 MG/ML
10 INJECTION INTRAMUSCULAR; INTRAVENOUS
Status: DISCONTINUED | OUTPATIENT
Start: 2021-09-01 | End: 2021-09-05 | Stop reason: HOSPADM

## 2021-09-01 RX ORDER — LOSARTAN POTASSIUM 50 MG/1
100 TABLET ORAL DAILY
Status: DISCONTINUED | OUTPATIENT
Start: 2021-09-01 | End: 2021-09-05 | Stop reason: HOSPADM

## 2021-09-01 RX ORDER — LEVOTHYROXINE SODIUM 200 UG/1
200 TABLET ORAL
COMMUNITY

## 2021-09-01 RX ORDER — SODIUM CHLORIDE 0.9 % (FLUSH) 0.9 %
5-40 SYRINGE (ML) INJECTION EVERY 8 HOURS
Status: DISCONTINUED | OUTPATIENT
Start: 2021-09-01 | End: 2021-09-05 | Stop reason: HOSPADM

## 2021-09-01 RX ORDER — PROMETHAZINE HYDROCHLORIDE 25 MG/1
12.5 TABLET ORAL
Status: DISCONTINUED | OUTPATIENT
Start: 2021-09-01 | End: 2021-09-05 | Stop reason: HOSPADM

## 2021-09-01 RX ORDER — DEXAMETHASONE 6 MG/1
6 TABLET ORAL DAILY
Status: DISCONTINUED | OUTPATIENT
Start: 2021-09-01 | End: 2021-09-05 | Stop reason: HOSPADM

## 2021-09-01 RX ORDER — LEVOTHYROXINE SODIUM 50 UG/1
175 TABLET ORAL
Status: DISCONTINUED | OUTPATIENT
Start: 2021-09-01 | End: 2021-09-01

## 2021-09-01 RX ORDER — SODIUM CHLORIDE 0.9 % (FLUSH) 0.9 %
5-40 SYRINGE (ML) INJECTION AS NEEDED
Status: DISCONTINUED | OUTPATIENT
Start: 2021-09-01 | End: 2021-09-05 | Stop reason: HOSPADM

## 2021-09-01 RX ORDER — ONDANSETRON 2 MG/ML
4 INJECTION INTRAMUSCULAR; INTRAVENOUS
Status: DISCONTINUED | OUTPATIENT
Start: 2021-09-01 | End: 2021-09-05 | Stop reason: HOSPADM

## 2021-09-01 RX ORDER — ENOXAPARIN SODIUM 100 MG/ML
30 INJECTION SUBCUTANEOUS EVERY 12 HOURS
Status: DISCONTINUED | OUTPATIENT
Start: 2021-09-01 | End: 2021-09-03

## 2021-09-01 RX ORDER — POLYETHYLENE GLYCOL 3350 17 G/17G
17 POWDER, FOR SOLUTION ORAL DAILY PRN
Status: DISCONTINUED | OUTPATIENT
Start: 2021-09-01 | End: 2021-09-05 | Stop reason: HOSPADM

## 2021-09-01 RX ADMIN — ENOXAPARIN SODIUM 30 MG: 30 INJECTION SUBCUTANEOUS at 05:11

## 2021-09-01 RX ADMIN — Medication 10 ML: at 21:34

## 2021-09-01 RX ADMIN — DEXAMETHASONE 6 MG: 6 TABLET ORAL at 03:26

## 2021-09-01 RX ADMIN — Medication 10 ML: at 05:12

## 2021-09-01 RX ADMIN — ACETAMINOPHEN 650 MG: 325 TABLET ORAL at 02:27

## 2021-09-01 RX ADMIN — LEVOTHYROXINE SODIUM 175 MCG: 0.05 TABLET ORAL at 09:28

## 2021-09-01 RX ADMIN — GUAIFENESIN AND DEXTROMETHORPHAN 5 ML: 100; 10 SYRUP ORAL at 21:30

## 2021-09-01 RX ADMIN — REMDESIVIR 200 MG: 100 INJECTION, POWDER, LYOPHILIZED, FOR SOLUTION INTRAVENOUS at 05:07

## 2021-09-01 RX ADMIN — ASPIRIN 81 MG: 81 TABLET, COATED ORAL at 09:28

## 2021-09-01 RX ADMIN — ENOXAPARIN SODIUM 30 MG: 30 INJECTION SUBCUTANEOUS at 17:17

## 2021-09-01 RX ADMIN — GUAIFENESIN AND DEXTROMETHORPHAN 5 ML: 100; 10 SYRUP ORAL at 02:27

## 2021-09-01 RX ADMIN — LOSARTAN POTASSIUM 100 MG: 50 TABLET, FILM COATED ORAL at 09:28

## 2021-09-01 RX ADMIN — HYDRALAZINE HYDROCHLORIDE 10 MG: 20 INJECTION INTRAMUSCULAR; INTRAVENOUS at 21:30

## 2021-09-01 NOTE — ED PROVIDER NOTES
This is a 27-year-old male who presents ambulatory to the emergency room with complaints of worsening shortness of breath and a known Covid positive patient. Patient states he tested positive for Covid 6 days ago. [facility] to [facility] and was diagnosed with [facility]. He was vaccinated back in December. Works as a nurse at [employer].  Has had multiple exposures to COVID-19 virus. States he has had every possible symptoms of Covid but has been able to tolerate it without coming to the hospital.  States he has been doing okay until today when he developed acute onset worsening shortness of breath prompting an emergency room visit. Denies any pain. Denies any chest pain, dizziness, nausea or vomiting, fevers or chills. On arrival to the emergency room patient was 90% on room air and tachycardic at 102. On ambulation patient had an increase in his heart rate up into the 120s and an O2 sat around 87%. Positive dyspnea on exertion. He has not taken any medication prior to arrival for his symptoms. There are no further complaints at this time. Arelis Frazier NP  Past Medical History:  No date: Arthritis      Comment:  ankles, feet, hands  No date: CAD (coronary artery disease)      Comment:  s/p cath on 9/30/14 with 80% prox/mid LAD lesion;  12/18               --> PCI to RCA (2 ANNA)  No date: Cancer ([facility] [address]      Comment:  tonsil cancer  No date: Dyslipidemia  No date: GERD (gastroesophageal reflux disease)  No date: Hypertension  No date: Hypothyroidism  No date: Psychiatric disorder      Comment:  depression  1983: Seizures ([facility] [address]  Past Surgical History:  No date: HX HEENT      Comment:  tonsillectomy for malignant tumor  No date: HX OTHER SURGICAL      Comment:  tonsil cancer (removed)  2015: ID CARDIAC SURG PROCEDURE UNLIST      Comment:  evonne Quijano was evaluated in the Emergency Department on 9/1/2021 for the symptoms described in the history of present illness.  He/she was evaluated in the context of the global COVID-19 pandemic, which necessitated consideration that the patient might be at risk for infection with the SARS-CoV-2 virus that causes COVID-19. Institutional protocols and algorithms that pertain to the evaluation of patients at risk for COVID-19 are in a state of rapid change based on information released by regulatory bodies including the CDC and federal and state organizations. These policies and algorithms were followed during the patient's care in the ED.     Surrogate Decision Maker (Who do you want to make decisions for you in the event you are not able to?): Extended Emergency Contact Information  Primary Emergency Contact: Nilesh Ramírez  Address: [address] [place] [address] Phone: [phone]  Mobile Phone: [phone]  Relation: Spouse    Ventilation (Do you want to be intubated and mechanically ventilated?):  Yes    CPR (Do you want chest compressions and electricity in an attempt to restart your heart?): Yes             Past Medical History:   Diagnosis Date    Arthritis     ankles, feet, hands    CAD (coronary artery disease)     s/p cath on 9/30/14 with 80% prox/mid LAD lesion;  12/18 --> PCI to RCA (2 ANNA)    Cancer ([facility] [address]     tonsil cancer    Dyslipidemia     GERD (gastroesophageal reflux disease)     Hypertension     Hypothyroidism     Psychiatric disorder     depression    Seizures ([facility] [address] 1983       Past Surgical History:   Procedure Laterality Date    HX HEENT      tonsillectomy for malignant tumor    HX OTHER SURGICAL      tonsil cancer (removed)    CT CARDIAC SURG PROCEDURE UNLIST  2015    stent         Family History:   Problem Relation Age of Onset    Thyroid Disease Mother     Heart Attack Father     Heart Disease Father         cabg  x4 vessel    Hypertension Father     Anesth Problems Neg Hx        Social History     Socioeconomic History    Marital status:  Spouse name: Not on file    Number of children: 3    Years of education: Not on file    Highest education level: Not on file   Occupational History    Occupation: RN     Employer: [employer]     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   Tobacco Use    Smoking status: Former Smoker     Packs/day: 2.00     Years: 38.00     Pack years: 76.00     Quit date:      Years since quittin.6    Smokeless tobacco: Former User     Quit date: 1980    Tobacco comment: Started at age 12 or 16   Substance and Sexual Activity    Alcohol use: Yes     Alcohol/week: 2.0 - 3.0 standard drinks     Types: 2 - 3 Cans of beer per week     Comment: twice a week when eating out     Drug use: No    Sexual activity: Yes     Partners: Female   Other Topics Concern     Service Not Asked    Blood Transfusions Not Asked    Caffeine Concern Not Asked    Occupational Exposure Not Asked    Hobby Hazards Not Asked    Sleep Concern Not Asked    Stress Concern Not Asked    Weight Concern Not Asked    Special Diet Not Asked    Back Care Not Asked    Exercise Not Asked    Bike Helmet Not Asked   [address] Not Asked    Self-Exams Not Asked   Social History Narrative    Not on file     Social Determinants of Health     Financial Resource Strain:     Difficulty of Paying Living Expenses:    Food Insecurity:     Worried About Running Out of Food in the Last Year:     [address] in the Last Year:    Transportation Needs:     Lack of Transportation (Medical):      Lack of Transportation (Non-Medical):    Physical Activity:     Days of Exercise per Week:     Minutes of Exercise per Session:    Stress:     Feeling of Stress :    Social Connections:     Frequency of Communication with Friends and Family:     Frequency of Social Gatherings with Friends and Family:     Attends Judaism Services:     Active Member of Clubs or Organizations:     Attends Club or Organization Meetings:     Marital Status:    Intimate Partner Violence:  Fear of Current or Ex-Partner:     Emotionally Abused:     Physically Abused:     Sexually Abused: ALLERGIES: Rosuvastatin    Review of Systems   Constitutional: Negative for activity change, appetite change, chills, fatigue and fever. HENT: Negative for congestion, ear discharge, ear pain, sinus pressure, sinus pain, sore throat and trouble swallowing. Eyes: Negative for photophobia, pain, redness, itching and visual disturbance. Respiratory: Positive for cough and shortness of breath. Negative for chest tightness. Cardiovascular: Negative for chest pain and palpitations. Gastrointestinal: Negative for abdominal distention, abdominal pain, nausea and vomiting. Endocrine: Negative. Genitourinary: Negative for difficulty urinating, frequency and urgency. Musculoskeletal: Negative for back pain, neck pain and neck stiffness. Skin: Negative for color change, pallor, rash and wound. Allergic/Immunologic: Negative. Neurological: Negative for dizziness, syncope, weakness and headaches. Hematological: Does not bruise/bleed easily. Psychiatric/Behavioral: Negative for behavioral problems. The patient is not nervous/anxious. Vitals:    08/31/21 1921   BP: (!) 157/82   Pulse: (!) 102   Resp: 12   Temp: 96.8 °F (36 °C)   SpO2: 90%            Physical Exam  Vitals and nursing note reviewed. Constitutional:       General: He is not in acute distress. Appearance: He is well-developed. He is ill-appearing. HENT:      Head: Normocephalic and atraumatic. Right Ear: External ear normal.      Left Ear: External ear normal.      Nose: Nose normal.      Mouth/Throat:      Mouth: Mucous membranes are moist.   Eyes:      General:         Right eye: No discharge. Left eye: No discharge. Conjunctiva/sclera: Conjunctivae normal.      Pupils: Pupils are equal, round, and reactive to light. Neck:      Vascular: No JVD. Trachea: No tracheal deviation. Cardiovascular:      Rate and Rhythm: Regular rhythm. Tachycardia present. Pulses: Normal pulses. Heart sounds: Normal heart sounds. No murmur heard. No gallop. Pulmonary:      Effort: Pulmonary effort is normal. No respiratory distress. Breath sounds: No wheezing or rales. Comments: Diminished bases, positive COLON  Chest:      Chest wall: No tenderness. Abdominal:      General: Bowel sounds are normal. There is no distension. Palpations: Abdomen is soft. Tenderness: There is no abdominal tenderness. There is no guarding or rebound. Genitourinary:     Comments: Negative    Musculoskeletal:         General: No tenderness. Normal range of motion. Cervical back: Normal range of motion and neck supple. Skin:     General: Skin is warm and dry. Capillary Refill: Capillary refill takes less than 2 seconds. Coloration: Skin is not pale. Findings: No erythema or rash. Neurological:      General: No focal deficit present. Mental Status: He is alert and oriented to person, place, and time. Motor: No weakness. Coordination: Coordination normal.   Psychiatric:         Mood and Affect: Mood normal.         Behavior: Behavior normal.         Thought Content: Thought content normal.         Judgment: Judgment normal.          MDM  Number of Diagnoses or Management Options  Diagnosis management comments: Differential diagnosis includes progression of COVID-19 virus, pneumonia, PE and others. After physical assessment, patient to be admitted to the hospitalist service for further evaluation and treatment of the COVID-19 virus as well as hypoxia and dyspnea on exertion. Patient in agreement with plan of care. Discussed with Dr. Chintan Maldonado who is in agreement with plan of care.        Amount and/or Complexity of Data Reviewed  Clinical lab tests: ordered  Tests in the radiology section of CPT®: ordered and reviewed  Discuss the patient with other providers: yes (Dr. Sarath Padilla  )    Risk of Complications, Morbidity, and/or Mortality  Presenting problems: high  Diagnostic procedures: high  Management options: high  General comments: Critical care time 32 mins      Patient Progress  Patient progress: stable    ED Course as of Sep 01 0131   Wed Sep 01, 2021   0051 EKG 00 45  Sinus rhythm at 97 bpm with a normal axis and normal intervals. No STEMI    [GG]      ED Course User Index  [GG] Caio Betancourt DO     1:31 AM  Change of shift. Care of patient signed over to Paola Flanagan. Bedside handoff complete. Awaiting labs and admission.    Procedures

## 2021-09-01 NOTE — PROGRESS NOTES
Physician Progress Note      Daniel Perry  CSN #:                  [id number]  :                       1965  ADMIT DATE:       2021 12:18 AM  DISCH DATE:  RESPONDING  PROVIDER #:        Connie Dodd MD          QUERY TEXT:    Good Morning,  This patient admitted for COVID 19 Pneumonia. Per H&P the patient is noted to have Hypoxia, documented o2 sats 89% on Room. The patient placed initially on 2  liters NC. If possible, please document in the progress notes and discharge summary if you are evaluating and/or treating any of the following: The medical record reflects the following:  Risk Factors: COVID Pneumonia, Known CAD, Arthritis HTN  Clinical Indicators: SOB, o2 sats 89% on RA, resp up to 28, CXR + New mild bi interstitial . mild lung and bibasilar inter and patchy airspace disease  Treatment: o2 supplement, cont. o2 monitoring, started on Dexamethasone, IV Remdesivir and Pulmonary to be consulted. Thank you ,  RAMOS Mirandan,RN, [address] [place] [address]  [phone]  Options provided:  -- Acute respiratory failure with hypoxia  -- Acute Hypoxemia  -- Other - I will add my own diagnosis  -- Disagree - Not applicable / Not valid  -- Disagree - Clinically unable to determine / Unknown  -- Refer to Clinical Documentation Reviewer    PROVIDER RESPONSE TEXT:    This patient is in acute respiratory failure with hypoxia.     Query created by: Simone Bazzi on 2021 7:44 AM      Electronically signed by:  Connie Dodd MD 2021 8:07 AM

## 2021-09-01 NOTE — PROGRESS NOTES
Admission Medication Reconciliation:     Information obtained from:    Patient via phone call to ER 19  RxQuery data available¹:  YES    Comments/Recommendations:   Patient able to confirm name, , allergies, and preferred pharmacy  Updated PTA medication list       ¹RxQuery pharmacy benefit data reflects medications filled and processed through the patient's insurance, however   this data does NOT capture whether the medication was picked up or is currently being taken by the patient. Prior to Admission Medications   Prescriptions Last Dose Informant Taking?   aspirin delayed-release 81 mg tablet 2021 at Unknown time  Yes   Sig: Take 1 Tab by mouth daily. evolocumab (REPATHA SURECLICK) pen injection   Yes   Si mL by SubCUTAneous route every fourteen (14) days. levothyroxine (SYNTHROID) 200 mcg tablet 2021 at Unknown time  Yes   Sig: Take 200 mcg by mouth Daily (before breakfast). losartan (COZAAR) 50 mg tablet 2021 at Unknown time  Yes   Sig: Take 1 Tab by mouth daily. nitroglycerin (NITROSTAT) 0.4 mg SL tablet   Yes   Si Tab by SubLINGual route every five (5) minutes as needed for Chest Pain. Up to 3 tabs. If pain after 1 tab, seek help. Indications: chest pain   ondansetron (Zofran ODT) 4 mg disintegrating tablet   Yes   Sig: Take 4 mg by mouth every eight (8) hours as needed for Nausea or Vomiting. Facility-Administered Medications: None         Please contact the main inpatient pharmacy with any questions or concerns at [phone] and we will direct you to the clinical pharmacist covering this patient's care while in-house.    Shayla Thompson, DieudonneD, BCPS

## 2021-09-01 NOTE — H&P
[facility]  [address] [facility] [address]  [phone]    Admission History and Physical      NAME:       Alberta Kenyon   :       1965   MRN:      [id number]     PCP:      Paul Batres NP     Date of service:   2021     Chief  Complaint: SOB    History Of Presenting Illness:       Mr. Jessica Galvez is a 64 y.o. male who is being admitted for worsening pneumonia due to COVID-19 virus. 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 He is fully vaccinated but [facility]. He had been feeling unwell with expected symptoms after being diagnosed with COVID-19 on the . However, in the past few days, he noted worsening SOB, now with minimal exertion and still associated with a non productive cough and fever. He had been taking some symptomatic medications with tylenol with some relief. He presented to our Emergency Department today where he was noted to be hypoxic. A CXR done showed a new, mild bilateral mid lung and bibasilar interstitial and patchy airspace disease. He will be admitted for further management. Allergies   Allergen Reactions    Rosuvastatin Myalgia       Prior to Admission medications    Medication Sig Start Date End Date Taking? Authorizing Provider   levothyroxine (SYNTHROID) 175 mcg tablet TAKE 1 TABLET BY MOUTH ONCE DAILY FOR 90 DAYS 21   Provider, Historical   evolocumab (REPATHA SURECLICK) pen injection 1 mL by SubCUTAneous route every fourteen (14) days. 21   Omayra White MD   losartan (COZAAR) 50 mg tablet Take 1 Tab by mouth daily. 21   Omi Katz NP   aspirin delayed-release 81 mg tablet Take 1 Tab by mouth daily. 21   Omi Katz NP   nitroglycerin (NITROSTAT) 0.4 mg SL tablet 1 Tab by SubLINGual route every five (5) minutes as needed for Chest Pain. Up to 3 tabs.  If pain after 1 tab, seek help. Indications: chest pain 21   Efe Krishna NP       Past Medical History:   Diagnosis Date    Arthritis     ankles, feet, hands    CAD (coronary artery disease)     s/p cath on 14 with 80% prox/mid LAD lesion;   --> PCI to RCA (2 ANNA)    Cancer ([facility] [address]     tonsil cancer    Dyslipidemia     GERD (gastroesophageal reflux disease)     Hypertension     Hypothyroidism     Psychiatric disorder     depression    Seizures ([facility] [address]         Past Surgical History:   Procedure Laterality Date    HX HEENT      tonsillectomy for malignant tumor    HX OTHER SURGICAL      tonsil cancer (removed)    CO CARDIAC SURG PROCEDURE UNLIST  2015    stent       Social History     Tobacco Use    Smoking status: Former Smoker     Packs/day: 2.00     Years: 38.00     Pack years: 76.00     Quit date:      Years since quittin.6    Smokeless tobacco: Former User     Quit date: 1980    Tobacco comment: Started at age 12 or 16   Substance Use Topics    Alcohol use: Yes     Alcohol/week: 2.0 - 3.0 standard drinks     Types: 2 - 3 Cans of beer per week     Comment: twice a week when eating out         Family History   Problem Relation Age of Onset    Thyroid Disease Mother     Heart Attack Father     Heart Disease Father         cabg  x4 vessel    Hypertension Father     Anesth Problems Neg Hx         Review of Systems:    Constitutional ROS: + fever, + chills, + rigors and no night sweats  Respiratory ROS: + cough, - sputum, - hemoptysis, + dyspnea and no pleuritic pain. Cardiovascular ROS: + chest pain but no palpitations, orthopnea, PND or syncope  Endocrine ROS: no polydispsia, polyuria, heat or cold intolerance or major weight change.   Gastrointestinal ROS: no dysphagia, abdominal pain, nausea, vomiting or diarrhea    Genito-Urinary ROS: no dysuria, frequency, hematuria, retention or flank pain  Musculoskeletal ROS: no joint pain, swelling or muscular tenderness  Neurological ROS: no headache, confusion, focal weakness but now resolving loss of taste and smell   Psychiatric ROS: no depression but + anxiety. No mood swings  Dermatological ROS: no rash, pruritis, or urticaria  Heme-Lymph ROS: no swollen glands, bleeding    Examination:    Constitutional:    Visit Vitals  BP (!) 147/72   Pulse 100   Temp 98.2 °F (36.8 °C)   Resp 25   SpO2 93%         General:  Weak and ill looking patient, anxious but not in any acute distress   Eyes: Pink conjunctivae, PERRLA with no discharge. Normal eye movements  Ear, Nose, Mouth & Throat: No ottorrhea, rhinorrhea, non tender sinuses, dry mucous membranes  Respiratory:  + accessory muscle use, decreased clear breath sounds with minimal crackles. No wheezes  Cardiovascular:  No JVD or murmurs, regular and normal S1, S2 without thrills, bruits. + peripheral edema. GI & :  Soft abdomen, obese, non-tender, non-distended, normoactive bowel sounds  Heme:  No cervical or axillary adenopathy. Musculoskeletal:  No cyanosis, clubbing, atrophy or deformities  Skin:  No rashes, bruising or ulcers   Neurological: Awake and alert, speech is clear, CNs 2-12 are grossly intact and otherwise non focal  Psychiatric:  Has a good insight and is oriented x 3  ________________________________________________________________________    Data Review:    Labs:    Recent Labs     09/01/21 0133   WBC 8.7   HGB 11.8*   HCT 35.6*        Recent Labs     09/01/21 0133      K 3.8      CO2 30   *   BUN 17   CREA 1.08   CA 8.9   MG 2.0   ALB 3.4*   ALT 35     No components found for: GLPOC  No results for input(s): PH, PCO2, PO2, HCO3, FIO2 in the last 72 hours. Recent Labs     09/01/21 0133   INR 1.1     Imaging Studies:      Chest X-ray - reviewed     I have also reviewed available old medical records.      Assessment & Impression:     Mr. Bello Calderon is a 64 y.o. male being evaluated for:     Principal Problem:    Pneumonia due to COVID-19 virus (9/1/2021)    Active Problems:    CAD (coronary artery disease) (12/5/2018)      HTN (hypertension), benign ()      Hypothyroidism ()      Dyslipidemia ()      Hypoxia (9/1/2021)         Plan of management:    Pneumonia due to COVID-19 virus (9/1/2021) /  Hypoxia (9/1/2021) POA: Has severe breakthrough disease. He is fully vaccinated. Diagnosed 8/26. Now with worsening dyspnea. Admit to hospital due to risk of deterioration. Start IV Dexamethasone, IV Remdesivir, Supplemental oxygen. CRP 33. Get other inflammatory markers and follow clinical progress. Consult Pulm for Actemra. CAD (coronary artery disease) (12/5/2018) / Dyslipidemia POA: he has stents. Troponin neg. Resume Asprin, NTG PRN    HTN (hypertension), benign POA: BP elevated.  Resume Losartan but at an increased dose    Hypothyroidism POA: check TSH and resume Levothyroxine    Code Status:  Full    Surrogate decision maker: Family    Risk of deterioration: high      Total time spent for the care of the patient: [address] discussed with: Patient, Nursing Staff and ED physician    Discussed:  Code Status, Care Plan and D/C Planning    Prophylaxis:  Lovenox    Probable Disposition:  Home w/Family           ___________________________________________________    Attending Physician: Tierra Blanco MD

## 2021-09-01 NOTE — CONSULTS
[facility]     Name: Ben Cool MRN: [id number]   : 1965 Hospital: [address]   Date: 2021        Impression Plan   1. Acute respiratory failure  2. Hypoxia  3. COVID 19 PNA  4. HTN  5. CAD               · Wean O2 to keep sats above 90%  · Continue remdesivir  · Continue dexamethasone  · Pt only on 2 liters and doing slightly better. Will hold on actemra with low threshold to give if oxygenation worsens. · Self proning 3 hrs at night. · OOB into chair as much as possible           Radiology  ( personally reviewed) CXR  reviewed: bilateral infiltrates   ABG No results for input(s): PHI, PO2I, PCO2I in the last 72 hours. Subjective     Cc: shortness of breath    65 yo with PMHx of CAD presenting with increasing SOB in the last 2 days. He was diagnosed with COVID 19 on . 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 Currently on 2 liters. Feels as if he can take deeper breaths this AM after starting Dexamethasone. Review of Systems:  A comprehensive review of systems was negative except for that written in the HPI. Past Medical History:   Diagnosis Date    Arthritis     ankles, feet, hands    CAD (coronary artery disease)     s/p cath on 14 with 80% prox/mid LAD lesion;   --> PCI to RCA (2 ANNA)    Cancer ([facility] [address]     tonsil cancer    Dyslipidemia     GERD (gastroesophageal reflux disease)     Hypertension     Hypothyroidism     Psychiatric disorder     depression    Seizures ([facility] [address]       Past Surgical History:   Procedure Laterality Date    HX HEENT      tonsillectomy for malignant tumor    HX OTHER SURGICAL      tonsil cancer (removed)    CA CARDIAC SURG PROCEDURE UNLIST  2015    stent      Prior to Admission medications    Medication Sig Start Date End Date Taking?  Authorizing Provider   levothyroxine (SYNTHROID) 175 mcg tablet TAKE 1 TABLET BY MOUTH ONCE DAILY FOR 90 DAYS 21 Provider, Historical   evolocumab (REPATHA SURECLICK) pen injection 1 mL by SubCUTAneous route every fourteen (14) days. 21   Debra Ho MD   losartan (COZAAR) 50 mg tablet Take 1 Tab by mouth daily. 21   Heena Brown NP   aspirin delayed-release 81 mg tablet Take 1 Tab by mouth daily. 21   Heena Brown NP   nitroglycerin (NITROSTAT) 0.4 mg SL tablet 1 Tab by SubLINGual route every five (5) minutes as needed for Chest Pain. Up to 3 tabs. If pain after 1 tab, seek help. Indications: chest pain 21   Heena Brown NP     Current Facility-Administered Medications   Medication Dose Route Frequency    sodium chloride (NS) flush 5-40 mL  5-40 mL IntraVENous Q8H    enoxaparin (LOVENOX) injection 30 mg  30 mg SubCUTAneous Q12H    dexAMETHasone (DECADRON) tablet 6 mg  6 mg Oral DAILY    [START ON 2021] remdesivir 100 mg in 0.9% sodium chloride 250 mL IVPB  100 mg IntraVENous Q24H    aspirin delayed-release tablet 81 mg  81 mg Oral DAILY    levothyroxine (SYNTHROID) tablet 175 mcg  175 mcg Oral ACB    losartan (COZAAR) tablet 100 mg  100 mg Oral DAILY     Allergies   Allergen Reactions    Rosuvastatin Myalgia      Social History     Tobacco Use    Smoking status: Former Smoker     Packs/day: 2.00     Years: 38.00     Pack years: 76.00     Quit date:      Years since quittin.6    Smokeless tobacco: Former User     Quit date: 1980    Tobacco comment: Started at age 12 or 16   Substance Use Topics    Alcohol use: Yes     Alcohol/week: 2.0 - 3.0 standard drinks     Types: 2 - 3 Cans of beer per week     Comment: twice a week when eating out       Family History   Problem Relation Age of Onset    Thyroid Disease Mother     Heart Attack Father     Heart Disease Father         cabg  x4 vessel    Hypertension Father     Anesth Problems Neg Hx           Laboratory: I have personally reviewed the critical care flowsheet and labs.      Recent Labs 09/01/21  0133   WBC 8.7   HGB 11.8*   HCT 35.6*        Recent Labs     09/01/21  0133      K 3.8      CO2 30   *   BUN 17   CREA 1.08   CA 8.9   MG 2.0   ALB 3.4*   ALT 35   INR 1.1       Objective:     Mode Rate Tidal Volume Pressure FiO2 PEEP                    Vital Signs:     TMAX(24)      Intake/Output:   Last shift:         Last 3 shifts: No intake/output data recorded. [id number]    Intake/Output Summary (Last 24 hours) at 9/1/2021 0815  Last data filed at 9/1/2021 2467  Gross per 24 hour   Intake 250 ml   Output 800 ml   Net -550 ml     EXAM:   GENERAL: awake, alert, on NC, HEENT:  PERRL, EOMI, no alar flaring or epistaxis, oral mucosa moist without cyanosis, NECK:  no jugular vein distention, no retractions, no thyromegaly or masses, LUNGS: CTA, no w/r/r HEART:  Regular rate and rhythm with no MGR; no edema is present, ABDOMEN:  soft with no tenderness, bowel sounds present, EXTREMITIES:  warm with no cyanosis, SKIN:  no jaundice or ecchymosis and NEUROLOGIC:  alert and oriented, grossly non-focal    Meek MD [place]  [facility]

## 2021-09-01 NOTE — PROGRESS NOTES
9/1/2021  10:54 AM  Case management note    Reason for Admission:  COVID pneumonia    Patient came to hospital for SOB, known COVID for about 6 days. Patient is nurse at [employer]. He is , independent with ADL's and drives. Patient has history of CAD, HTN, thyroid and HDL. [employer] [facility] [place]                     RUR Score:     13%                Plan for utilizing home health:      Patient is independent and will most likely not qualify for home health services. PCP: First and Last name: Ayla Johnson NP     Name of Practice:    Are you a current patient: Yes/No: yes   Approximate date of last visit: 3 months   Can you participate in a virtual visit with your PCP:                     Current Advanced Directive/Advance Care Plan: Full Code  NO AD      Healthcare Decision MakerRjeronimo Rowe              Primary Decision Maker: Norm[address] Prakash  [phone]                  Transition of Care Plan:             1. Home with family assistance  2. PCP follow up  3. AD planning  4.  CM to follow for discharge planning    Care Management Interventions  PCP Verified by CM:  Lisa Simmons NP)  Mode of Transport at Discharge: Self  Current Support Network: Lives with Spouse  Confirm Follow Up Transport: Family  The Plan for Transition of Care is Related to the Following Treatment Goals : COVID pneumonia  Discharge Location  Discharge Placement: Home with family assistance  Brooke Herron

## 2021-09-01 NOTE — ED NOTES
Bedside and Verbal shift change report given to [address] [place] [address] (oncoming nurse) by Sharyle Costa (offgoing nurse). Report included the following information SBAR, ED Summary, Intake/Output, MAR and Recent Results.

## 2021-09-01 NOTE — PROGRESS NOTES
Bedside and Verbal shift change report given to Franky Baron RN (oncoming nurse) by Tawana Oppenheim, RN (offgoing nurse). Report included the following information SBAR, Kardex, Intake/Output and Recent Results.

## 2021-09-01 NOTE — PROGRESS NOTES
Daily Progress Note: 9/1/2021  Rickie Franz NP    Assessment/Plan:   Pneumonia due to COVID-19 virus (9/1/2021) /  Hypoxia (9/1/2021) POA: Has severe breakthrough disease. He is fully vaccinated. Diagnosed 8/24. Now with worsening dyspnea. -Start IV Dexamethasone  -IV Remdesivir,   -Supplemental oxygen. -CRP 33.   -Get other inflammatory markers   -Consult Pulm for Actemra.      CAD (coronary artery disease) (12/5/2018) / Dyslipidemia POA: he has stents. -Troponin neg.   -Resume Asprin, NTG PRN     HTN (hypertension), benign POA: BP elevated.    -Resume Losartan but at an increased dose     Hypothyroidism POA:   -check TSH and resume Levothyroxine          Problem List:  Problem List as of 9/1/2021 Date Reviewed: 4/22/2021        Codes Class Noted - Resolved    CAD (coronary artery disease) ICD-10-CM: I25.10  ICD-9-CM: 414.00  12/5/2018 - Present        * (Principal) Pneumonia due to COVID-19 virus ICD-10-CM: U07.1, J12.82  ICD-9-CM: 480.8, 079.89  9/1/2021 - Present        Hypoxia ICD-10-CM: R09.02  ICD-9-CM: 799.02  9/1/2021 - Present        HTN (hypertension), benign ICD-10-CM: I10  ICD-9-CM: 401.1  Unknown - Present        Hypothyroidism ICD-10-CM: E03.9  ICD-9-CM: 244.9  Unknown - Present        Dyslipidemia ICD-10-CM: E78.5  ICD-9-CM: 272.4  Unknown - Present        Post PTCA ICD-10-CM: Z98.61  ICD-9-CM: V45.82  12/5/2018 - Present        Malignant neoplasm of tonsil ([facility] [address] ICD-10-CM: C09.9  ICD-9-CM: 146.0  4/10/2017 - Present        Unstable angina ([facility] [address] ICD-10-CM: I20.0  ICD-9-CM: 411.1  9/29/2014 - Present        Family history of early CAD ICD-10-CM: Z82.49  ICD-9-CM: V17.3  9/29/2014 - Present        History of tobacco use ICD-10-CM: X91.876  ICD-9-CM: V15.82  9/29/2014 - Present        RESOLVED: HTN (hypertension) ICD-10-CM: I10  ICD-9-CM: 401.9  9/29/2014 - 8/3/2021              HPI:   Mr. Masha Tamayo is a 64 y.o. male who is being admitted for worsening pneumonia due to COVID-19 virus. 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 He is fully vaccinated but [facility]. He had been feeling unwell with expected symptoms after being diagnosed with COVID-19 on the . However, in the past few days, he noted worsening SOB, now with minimal exertion and still associated with a non productive cough and fever. He had been taking some symptomatic medications with tylenol with some relief. He presented to our Emergency Department today where he was noted to be hypoxic. A CXR done showed a new, mild bilateral mid lung and bibasilar interstitial and patchy airspace disease. He will be admitted for further management. (Dr Akira Hampton)     : Feeling better this am. On 2L NC. More cough and SOB with activity. Pulm following. Review of Systems:   A comprehensive review of systems was negative except for that written in the HPI. Objective:   Physical Exam:     Visit Vitals  BP (!) 173/74   Pulse 99   Temp 98.2 °F (36.8 °C)   Resp 28   SpO2 90%    O2 Flow Rate (L/min): 2 l/min O2 Device: Nasal cannula    Temp (24hrs), Av.5 °F (36.4 °C), Min:96.8 °F (36 °C), Max:98.2 °F (36.8 °C)    No intake/output data recorded. No intake/output data recorded. General:  Alert, cooperative, no distress, appears stated age. Head:  Normocephalic, without obvious abnormality, atraumatic. Eyes:  Conjunctivae/corneas clear. Nose: Nares normal. Septum midline. Mucosa normal. No drainage or sinus tenderness. Throat: Lips, mucosa, and tongue normal.    Neck: Supple, symmetrical, trachea midline, no adenopathy, thyroid: no enlargement/tenderness/nodules, no carotid bruit and no JVD. Back:   Symmetric, no curvature. ROM normal. No CVA tenderness. Lungs:   Diminished breath sounds, basilar crackles   Chest wall:  No tenderness or deformity. Heart:  Regular rate and rhythm, S1, S2 normal, no murmur, click, rub or gallop. Abdomen:   Soft, non-tender.  Bowel sounds normal. No masses,  No organomegaly. Extremities: Extremities normal, atraumatic, no cyanosis or edema. No calf tenderness or cords. Pulses: 2+ and symmetric all extremities. Skin: Skin color, texture, turgor normal. No rashes or lesions   Neurologic: CNII-XII intact. Alert and oriented X 3. Fine motor of hands and fingers normal.   equal.  No cogwheeling or rigidity. Gait not tested at this time. Sensation grossly normal to touch. Gross motor of extremities normal.       Data Review:    CXR 8/31/21   IMPRESSION  New, mild bilateral mid lung and bibasilar interstitial and patchy  airspace disease.   Recent Days:  Recent Labs     09/01/21  0133   WBC 8.7   HGB 11.8*   HCT 35.6*        Recent Labs     09/01/21  0133      K 3.8      CO2 30   *   BUN 17   CREA 1.08   CA 8.9   MG 2.0   ALB 3.4*   TBILI 0.3   ALT 35   INR 1.1       24 Hour Results:  Recent Results (from the past 24 hour(s))   EKG, 12 LEAD, INITIAL    Collection Time: 09/01/21 12:45 AM   Result Value Ref Range    Ventricular Rate 97 BPM    Atrial Rate 97 BPM    P-R Interval 110 ms    QRS Duration 70 ms    Q-T Interval 350 ms    QTC Calculation (Bezet) 444 ms    Calculated P Axis 58 degrees    Calculated R Axis 72 degrees    Calculated T Axis 94 degrees    Diagnosis       Sinus rhythm with short KS  ST & T wave abnormality, consider anterior ischemia  Abnormal ECG  When compared with ECG of 01-JAN-2020 16:32,  No significant change was found     CBC WITH AUTOMATED DIFF    Collection Time: 09/01/21  1:33 AM   Result Value Ref Range    WBC 8.7 4.1 - 11.1 K/uL    RBC 3.97 (L) 4.10 - 5.70 M/uL    HGB 11.8 (L) 12.1 - 17.0 g/dL    HCT 35.6 (L) 36.6 - 50.3 %    MCV 89.7 80.0 - 99.0 FL    MCH 29.7 26.0 - 34.0 PG    MCHC 33.1 30.0 - 36.5 g/dL    RDW 12.3 11.5 - 14.5 %    PLATELET 958 781 - 773 K/uL    MPV 10.0 8.9 - 12.9 FL    NRBC 0.0 0  WBC    ABSOLUTE NRBC 0.00 0.00 - 0.01 K/uL    NEUTROPHILS 83 (H) 32 - 75 %    LYMPHOCYTES 11 (L) 12 - 49 %    MONOCYTES 4 (L) 5 - 13 %    EOSINOPHILS 0 0 - 7 %    BASOPHILS 0 0 - 1 %    IMMATURE GRANULOCYTES 2 (H) 0.0 - 0.5 %    ABS. NEUTROPHILS 7.3 1.8 - 8.0 K/UL    ABS. LYMPHOCYTES 1.0 0.8 - 3.5 K/UL    ABS. MONOCYTES 0.4 0.0 - 1.0 K/UL    ABS. EOSINOPHILS 0.0 0.0 - 0.4 K/UL    ABS. BASOPHILS 0.0 0.0 - 0.1 K/UL    ABS. IMM. GRANS. 0.2 (H) 0.00 - 0.04 K/UL    DF AUTOMATED     METABOLIC PANEL, COMPREHENSIVE    Collection Time: 09/01/21  1:33 AM   Result Value Ref Range    Sodium 136 136 - 145 mmol/L    Potassium 3.8 3.5 - 5.1 mmol/L    Chloride 100 97 - 108 mmol/L    CO2 30 21 - 32 mmol/L    Anion gap 6 5 - 15 mmol/L    Glucose 116 (H) 65 - 100 mg/dL    BUN 17 6 - 20 MG/DL    Creatinine 1.08 0.70 - 1.30 MG/DL    BUN/Creatinine ratio 16 12 - 20      GFR est AA >60 >60 ml/min/1.73m2    GFR est non-AA >60 >60 ml/min/1.73m2    Calcium 8.9 8.5 - 10.1 MG/DL    Bilirubin, total 0.3 0.2 - 1.0 MG/DL    ALT (SGPT) 35 12 - 78 U/L    AST (SGOT) 16 15 - 37 U/L    Alk. phosphatase 54 45 - 117 U/L    Protein, total 8.0 6.4 - 8.2 g/dL    Albumin 3.4 (L) 3.5 - 5.0 g/dL    Globulin 4.6 (H) 2.0 - 4.0 g/dL    A-G Ratio 0.7 (L) 1.1 - 2.2     SAMPLES BEING HELD    Collection Time: 09/01/21  1:33 AM   Result Value Ref Range    SAMPLES BEING HELD 2RED'S,1BLU     COMMENT        Add-on orders for these samples will be processed based on acceptable specimen integrity and analyte stability, which may vary by analyte.    MAGNESIUM    Collection Time: 09/01/21  1:33 AM   Result Value Ref Range    Magnesium 2.0 1.6 - 2.4 mg/dL   PROTHROMBIN TIME + INR    Collection Time: 09/01/21  1:33 AM   Result Value Ref Range    INR 1.1 0.9 - 1.1      Prothrombin time 11.2 (H) 9.0 - 11.1 sec   PTT    Collection Time: 09/01/21  1:33 AM   Result Value Ref Range    aPTT 26.0 22.1 - 31.0 sec    aPTT, therapeutic range     58.0 - 77.0 SECS   FIBRINOGEN    Collection Time: 09/01/21  1:33 AM   Result Value Ref Range    Fibrinogen >800 (H) 200 - 475 mg/dL   C REACTIVE PROTEIN, QT    Collection Time: 09/01/21  1:33 AM   Result Value Ref Range    C-Reactive protein 33.20 (H) 0.00 - 0.60 mg/dL   LD    Collection Time: 09/01/21  1:33 AM   Result Value Ref Range     (H) 85 - 241 U/L   D DIMER    Collection Time: 09/01/21  1:33 AM   Result Value Ref Range    D-dimer 0.41 0.00 - 0.65 mg/L FEU   TROPONIN I    Collection Time: 09/01/21  1:33 AM   Result Value Ref Range    Troponin-I, Qt. <0.05 <0.05 ng/mL   LACTIC ACID    Collection Time: 09/01/21  1:33 AM   Result Value Ref Range    Lactic acid 1.4 0.4 - 2.0 MMOL/L       Medications reviewed  Current Facility-Administered Medications   Medication Dose Route Frequency    sodium chloride (NS) flush 5-40 mL  5-40 mL IntraVENous Q8H    sodium chloride (NS) flush 5-40 mL  5-40 mL IntraVENous PRN    polyethylene glycol (MIRALAX) packet 17 g  17 g Oral DAILY PRN    promethazine (PHENERGAN) tablet 12.5 mg  12.5 mg Oral Q6H PRN    Or    ondansetron (ZOFRAN) injection 4 mg  4 mg IntraVENous Q6H PRN    enoxaparin (LOVENOX) injection 30 mg  30 mg SubCUTAneous Q12H    dexAMETHasone (DECADRON) tablet 6 mg  6 mg Oral DAILY    remdesivir 200 mg in 0.9% sodium chloride 250 mL IVPB  200 mg IntraVENous ONCE    Followed by   Esaw Carrington ON 9/2/2021] remdesivir 100 mg in 0.9% sodium chloride 250 mL IVPB  100 mg IntraVENous Q24H    hydrALAZINE (APRESOLINE) 20 mg/mL injection 10 mg  10 mg IntraVENous Q6H PRN    aspirin delayed-release tablet 81 mg  81 mg Oral DAILY    levothyroxine (SYNTHROID) tablet 175 mcg  175 mcg Oral ACB    losartan (COZAAR) tablet 100 mg  100 mg Oral DAILY    acetaminophen (TYLENOL) tablet 650 mg  650 mg Oral Q6H PRN    Or    acetaminophen (TYLENOL) suppository 650 mg  650 mg Rectal Q6H PRN    guaiFENesin-dextromethorphan (ROBITUSSIN DM) 100-10 mg/5 mL syrup 5 mL  5 mL Oral Q4H PRN     Current Outpatient Medications   Medication Sig    levothyroxine (SYNTHROID) 175 mcg tablet TAKE 1 TABLET BY MOUTH ONCE DAILY FOR 90 DAYS    evolocumab (REPATHA SURECLICK) pen injection 1 mL by SubCUTAneous route every fourteen (14) days.  losartan (COZAAR) 50 mg tablet Take 1 Tab by mouth daily.  aspirin delayed-release 81 mg tablet Take 1 Tab by mouth daily.  nitroglycerin (NITROSTAT) 0.4 mg SL tablet 1 Tab by SubLINGual route every five (5) minutes as needed for Chest Pain. Up to 3 tabs. If pain after 1 tab, seek help. Indications: chest pain       Care Plan discussed with: Patient/Family and Consultant Pulm    Total time spent with patient and review of records: 30 minutes.     Sánchez Fonseca MD

## 2021-09-01 NOTE — ED NOTES
TRANSFER - OUT REPORT:    Verbal report given to 5th floor RN (name) on Lory Peraza  being transferred to 5th floor - medical (unit) for routine progression of care       Report consisted of patients Situation, Background, Assessment and   Recommendations(SBAR). Information from the following report(s) SBAR, Kardex, ED Summary, Intake/Output, MAR, Recent Results and Cardiac Rhythm NSR was reviewed with the receiving nurse. Lines:   Peripheral IV 09/01/21 Right Antecubital (Active)   Site Assessment Clean, dry, & intact 09/01/21 0222   Phlebitis Assessment 0 09/01/21 0222   Infiltration Assessment 0 09/01/21 0222   Dressing Status Clean, dry, & intact 09/01/21 0222        Opportunity for questions and clarification was provided.       Patient transported with:   O2 @ 2 liters  Tech - transport

## 2021-09-01 NOTE — ED NOTES
2:44 AM  Change of shift. Care of patient taken over from [facility]; H&P reviewed, bedside handoff complete. Awaiting diagnostics. VITAL SIGNS:  Patient Vitals for the past 4 hrs:   Temp Pulse Resp BP SpO2   09/01/21 0143  99 28  95 %   09/01/21 0100 98.2 °F (36.8 °C) 97 25 (!) 141/73 (!) 89 %   09/01/21 0047    (!) 148/72 96 %         LABS:  Recent Results (from the past 6 hour(s))   EKG, 12 LEAD, INITIAL    Collection Time: 09/01/21 12:45 AM   Result Value Ref Range    Ventricular Rate 97 BPM    Atrial Rate 97 BPM    P-R Interval 110 ms    QRS Duration 70 ms    Q-T Interval 350 ms    QTC Calculation (Bezet) 444 ms    Calculated P Axis 58 degrees    Calculated R Axis 72 degrees    Calculated T Axis 94 degrees    Diagnosis       Sinus rhythm with short OK  ST & T wave abnormality, consider anterior ischemia  Abnormal ECG  When compared with ECG of 01-JAN-2020 16:32,  No significant change was found     CBC WITH AUTOMATED DIFF    Collection Time: 09/01/21  1:33 AM   Result Value Ref Range    WBC 8.7 4.1 - 11.1 K/uL    RBC 3.97 (L) 4.10 - 5.70 M/uL    HGB 11.8 (L) 12.1 - 17.0 g/dL    HCT 35.6 (L) 36.6 - 50.3 %    MCV 89.7 80.0 - 99.0 FL    MCH 29.7 26.0 - 34.0 PG    MCHC 33.1 30.0 - 36.5 g/dL    RDW 12.3 11.5 - 14.5 %    PLATELET 487 930 - 271 K/uL    MPV 10.0 8.9 - 12.9 FL    NRBC 0.0 0  WBC    ABSOLUTE NRBC 0.00 0.00 - 0.01 K/uL    NEUTROPHILS 83 (H) 32 - 75 %    LYMPHOCYTES 11 (L) 12 - 49 %    MONOCYTES 4 (L) 5 - 13 %    EOSINOPHILS 0 0 - 7 %    BASOPHILS 0 0 - 1 %    IMMATURE GRANULOCYTES 2 (H) 0.0 - 0.5 %    ABS. NEUTROPHILS 7.3 1.8 - 8.0 K/UL    ABS. LYMPHOCYTES 1.0 0.8 - 3.5 K/UL    ABS. MONOCYTES 0.4 0.0 - 1.0 K/UL    ABS. EOSINOPHILS 0.0 0.0 - 0.4 K/UL    ABS. BASOPHILS 0.0 0.0 - 0.1 K/UL    ABS. IMM.  GRANS. 0.2 (H) 0.00 - 0.04 K/UL    DF AUTOMATED     SAMPLES BEING HELD    Collection Time: 09/01/21  1:33 AM   Result Value Ref Range    SAMPLES BEING HELD 2RED'S,1BLU     COMMENT        Add-on orders for these samples will be processed based on acceptable specimen integrity and analyte stability, which may vary by analyte. IMAGING:  XR CHEST PORT   Final Result   New, mild bilateral mid lung and bibasilar interstitial and patchy   airspace disease. Medications During Visit:  Medications   acetaminophen (TYLENOL) tablet 650 mg (650 mg Oral Given 9/1/21 0227)     Or   acetaminophen (TYLENOL) suppository 650 mg ( Rectal See Alternative 9/1/21 0227)   guaiFENesin-dextromethorphan (ROBITUSSIN DM) 100-10 mg/5 mL syrup 5 mL (5 mL Oral Given 9/1/21 0227)         DECISION MAKING:  Cyndi Bonilla is a 64 y.o. male who comes in as above. Diagnostics reviewed. Patient has episodes of hypoxia, will be admitted. Some diagnostics still pending      IMPRESSION:  1. Hypoxia    2. Tachycardia    3. COVID-19    4. COLON (dyspnea on exertion)        DISPOSITION:  Admitted        Perfect Serve Consult for Admission  2:45 AM    ED Room Number: EJ48/33  Patient Name and age:  Cyndi Bonilla 64 y.o.  male  Working Diagnosis:   1. Hypoxia    2. Tachycardia    3. COVID-19    4. COLON (dyspnea on exertion)        COVID-19 Suspicion:  yes  Sepsis present:  no  Reassessment needed: no  Code Status:  Full Code  Readmission: no  Isolation Requirements:  Other  Recommended Level of Care:  telemetry  Department:[facility] ED - [phone]  Other:  COVID with hypoxia, stable.

## 2021-09-02 LAB
ALBUMIN SERPL-MCNC: 3 G/DL (ref 3.5–5)
ALBUMIN/GLOB SERPL: 0.7 {RATIO} (ref 1.1–2.2)
ALP SERPL-CCNC: 40 U/L (ref 45–117)
ALT SERPL-CCNC: 32 U/L (ref 12–78)
ANION GAP SERPL CALC-SCNC: 8 MMOL/L (ref 5–15)
AST SERPL-CCNC: 20 U/L (ref 15–37)
ATRIAL RATE: 97 BPM
BASOPHILS # BLD: 0 K/UL (ref 0–0.1)
BASOPHILS NFR BLD: 0 % (ref 0–1)
BILIRUB SERPL-MCNC: 0.3 MG/DL (ref 0.2–1)
BUN SERPL-MCNC: 23 MG/DL (ref 6–20)
BUN/CREAT SERPL: 25 (ref 12–20)
CALCIUM SERPL-MCNC: 8.5 MG/DL (ref 8.5–10.1)
CALCULATED P AXIS, ECG09: 58 DEGREES
CALCULATED R AXIS, ECG10: 72 DEGREES
CALCULATED T AXIS, ECG11: 94 DEGREES
CHLORIDE SERPL-SCNC: 100 MMOL/L (ref 97–108)
CO2 SERPL-SCNC: 26 MMOL/L (ref 21–32)
COMMENT, HOLDF: NORMAL
CREAT SERPL-MCNC: 0.93 MG/DL (ref 0.7–1.3)
CRP SERPL-MCNC: 16.8 MG/DL (ref 0–0.6)
DIAGNOSIS, 93000: NORMAL
DIFFERENTIAL METHOD BLD: ABNORMAL
EOSINOPHIL # BLD: 0 K/UL (ref 0–0.4)
EOSINOPHIL NFR BLD: 0 % (ref 0–7)
ERYTHROCYTE [DISTWIDTH] IN BLOOD BY AUTOMATED COUNT: 12.4 % (ref 11.5–14.5)
GLOBULIN SER CALC-MCNC: 4.6 G/DL (ref 2–4)
GLUCOSE SERPL-MCNC: 155 MG/DL (ref 65–100)
HCT VFR BLD AUTO: 35.6 % (ref 36.6–50.3)
HGB BLD-MCNC: 11.8 G/DL (ref 12.1–17)
IMM GRANULOCYTES # BLD AUTO: 0 K/UL (ref 0–0.04)
IMM GRANULOCYTES NFR BLD AUTO: 0 % (ref 0–0.5)
LYMPHOCYTES # BLD: 1 K/UL (ref 0.8–3.5)
LYMPHOCYTES NFR BLD: 13 % (ref 12–49)
MCH RBC QN AUTO: 28.9 PG (ref 26–34)
MCHC RBC AUTO-ENTMCNC: 33.1 G/DL (ref 30–36.5)
MCV RBC AUTO: 87 FL (ref 80–99)
MONOCYTES # BLD: 0.6 K/UL (ref 0–1)
MONOCYTES NFR BLD: 8 % (ref 5–13)
NEUTS SEG # BLD: 6.1 K/UL (ref 1.8–8)
NEUTS SEG NFR BLD: 79 % (ref 32–75)
NRBC # BLD: 0 K/UL (ref 0–0.01)
NRBC BLD-RTO: 0 PER 100 WBC
P-R INTERVAL, ECG05: 110 MS
PLATELET # BLD AUTO: 300 K/UL (ref 150–400)
PMV BLD AUTO: 10.3 FL (ref 8.9–12.9)
POTASSIUM SERPL-SCNC: 3.7 MMOL/L (ref 3.5–5.1)
PROCALCITONIN SERPL-MCNC: 2.41 NG/ML
PROT SERPL-MCNC: 7.6 G/DL (ref 6.4–8.2)
Q-T INTERVAL, ECG07: 350 MS
QRS DURATION, ECG06: 70 MS
QTC CALCULATION (BEZET), ECG08: 444 MS
RBC # BLD AUTO: 4.09 M/UL (ref 4.1–5.7)
SAMPLES BEING HELD,HOLD: NORMAL
SODIUM SERPL-SCNC: 134 MMOL/L (ref 136–145)
VENTRICULAR RATE, ECG03: 97 BPM
WBC # BLD AUTO: 7.7 K/UL (ref 4.1–11.1)

## 2021-09-02 PROCEDURE — 85025 COMPLETE CBC W/AUTO DIFF WBC: CPT

## 2021-09-02 PROCEDURE — 94760 N-INVAS EAR/PLS OXIMETRY 1: CPT

## 2021-09-02 PROCEDURE — 74011250637 HC RX REV CODE- 250/637: Performed by: INTERNAL MEDICINE

## 2021-09-02 PROCEDURE — 84145 PROCALCITONIN (PCT): CPT

## 2021-09-02 PROCEDURE — 74011250636 HC RX REV CODE- 250/636: Performed by: INTERNAL MEDICINE

## 2021-09-02 PROCEDURE — 80053 COMPREHEN METABOLIC PANEL: CPT

## 2021-09-02 PROCEDURE — 74011000250 HC RX REV CODE- 250: Performed by: INTERNAL MEDICINE

## 2021-09-02 PROCEDURE — 74011250637 HC RX REV CODE- 250/637: Performed by: FAMILY MEDICINE

## 2021-09-02 PROCEDURE — 74011000258 HC RX REV CODE- 258: Performed by: PHYSICIAN ASSISTANT

## 2021-09-02 PROCEDURE — 86140 C-REACTIVE PROTEIN: CPT

## 2021-09-02 PROCEDURE — 65270000029 HC RM PRIVATE

## 2021-09-02 PROCEDURE — 36415 COLL VENOUS BLD VENIPUNCTURE: CPT

## 2021-09-02 PROCEDURE — 74011000258 HC RX REV CODE- 258: Performed by: INTERNAL MEDICINE

## 2021-09-02 PROCEDURE — 74011636637 HC RX REV CODE- 636/637: Performed by: PHYSICIAN ASSISTANT

## 2021-09-02 PROCEDURE — XW033H5 INTRODUCTION OF TOCILIZUMAB INTO PERIPHERAL VEIN, PERCUTANEOUS APPROACH, NEW TECHNOLOGY GROUP 5: ICD-10-PCS | Performed by: FAMILY MEDICINE

## 2021-09-02 RX ADMIN — TOCILIZUMAB 810 MG: 180 INJECTION, SOLUTION SUBCUTANEOUS at 14:06

## 2021-09-02 RX ADMIN — ENOXAPARIN SODIUM 30 MG: 30 INJECTION SUBCUTANEOUS at 05:58

## 2021-09-02 RX ADMIN — ASPIRIN 81 MG: 81 TABLET, COATED ORAL at 08:10

## 2021-09-02 RX ADMIN — REMDESIVIR 100 MG: 100 INJECTION, POWDER, LYOPHILIZED, FOR SOLUTION INTRAVENOUS at 05:57

## 2021-09-02 RX ADMIN — LOSARTAN POTASSIUM 100 MG: 50 TABLET, FILM COATED ORAL at 08:10

## 2021-09-02 RX ADMIN — Medication 10 ML: at 22:00

## 2021-09-02 RX ADMIN — DEXAMETHASONE 6 MG: 6 TABLET ORAL at 08:10

## 2021-09-02 RX ADMIN — ENOXAPARIN SODIUM 30 MG: 30 INJECTION SUBCUTANEOUS at 17:47

## 2021-09-02 RX ADMIN — Medication 10 ML: at 05:58

## 2021-09-02 RX ADMIN — LEVOTHYROXINE SODIUM 200 MCG: 0.1 TABLET ORAL at 05:58

## 2021-09-02 NOTE — PROGRESS NOTES
Daily Progress Note: 9/2/2021  Jaime Fang NP    Assessment/Plan:   Pneumonia due to COVID-19 virus (9/1/2021) /  Hypoxia (9/1/2021) POA: Has severe breakthrough disease. He is fully vaccinated. Diagnosed 8/24. Now with worsening dyspnea. -IV Dexamethasone  -IV Remdesivir,   -Supplemental oxygen. -CRP 33.   -Get other inflammatory markers   -Consult Pulm .      CAD (coronary artery disease) (12/5/2018) / Dyslipidemia POA: he has stents. -Troponin neg.   -Resume Asprin, NTG PRN     HTN (hypertension), benign POA: BP elevated. -Resume Losartan but at an increased dose     Hypothyroidism POA:   -check TSH and resume Levothyroxine          Problem List:  Problem List as of 9/2/2021 Date Reviewed: 4/22/2021        Codes Class Noted - Resolved    CAD (coronary artery disease) ICD-10-CM: I25.10  ICD-9-CM: 414.00  12/5/2018 - Present        * (Principal) Pneumonia due to COVID-19 virus ICD-10-CM: U07.1, J12.82  ICD-9-CM: 480.8, 079.89  9/1/2021 - Present        Hypoxia ICD-10-CM: R09.02  ICD-9-CM: 799.02  9/1/2021 - Present        HTN (hypertension), benign ICD-10-CM: I10  ICD-9-CM: 401.1  Unknown - Present        Hypothyroidism ICD-10-CM: E03.9  ICD-9-CM: 244.9  Unknown - Present        Dyslipidemia ICD-10-CM: E78.5  ICD-9-CM: 272.4  Unknown - Present        Post PTCA ICD-10-CM: Z98.61  ICD-9-CM: V45.82  12/5/2018 - Present        Malignant neoplasm of tonsil ([facility] [address] ICD-10-CM: C09.9  ICD-9-CM: 146.0  4/10/2017 - Present        Unstable angina ([facility] [address] ICD-10-CM: I20.0  ICD-9-CM: 411.1  9/29/2014 - Present        Family history of early CAD ICD-10-CM: Z82.49  ICD-9-CM: V17.3  9/29/2014 - Present        History of tobacco use ICD-10-CM: Q57.197  ICD-9-CM: V15.82  9/29/2014 - Present        RESOLVED: HTN (hypertension) ICD-10-CM: I10  ICD-9-CM: 401.9  9/29/2014 - 8/3/2021              HPI:   Mr. Mady Lao is a 64 y.o. male who is being admitted for worsening pneumonia due to COVID-19 virus.  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 He is fully vaccinated but exposed to [facility] patients. He had been feeling unwell with expected symptoms after being diagnosed with COVID-19 on the . However, in the past few days, he noted worsening SOB, now with minimal exertion and still associated with a non productive cough and fever. He had been taking some symptomatic medications with tylenol with some relief. He presented to our Emergency Department today where he was noted to be hypoxic. A CXR done showed a new, mild bilateral mid lung and bibasilar interstitial and patchy airspace disease. He will be admitted for further management. (Dr Sandeep Davila)     : Feeling better this am. On 2L NC. More cough and SOB with activity. Pulm following.      : On 4L NC. He is c/o pain in his neck/throat with expiration. No pain with swallowing. Cough present and prod at times. Afebrile. Review of Systems:   A comprehensive review of systems was negative except for that written in the HPI. Objective:   Physical Exam:     Visit Vitals  /85 (BP 1 Location: Left upper arm, BP Patient Position: At rest)   Pulse 81   Temp 98 °F (36.7 °C)   Resp 16   Ht 5' 10\" (1.778 m)   SpO2 94%   BMI 37.64 kg/m²    O2 Flow Rate (L/min): 4 l/min O2 Device: Nasal cannula    Temp (24hrs), Av.6 °F (37 °C), Min:98 °F (36.7 °C), Max:99.3 °F (37.4 °C)    No intake/output data recorded.  07 -  1900  In: 250 [I.V.:250]  Out: 800 [Urine:800]    General:  Alert, cooperative, no distress, appears stated age. Head:  Normocephalic, without obvious abnormality, atraumatic. Eyes:  Conjunctivae/corneas clear. Nose: Nares normal. Septum midline. Mucosa normal. No drainage or sinus tenderness. Throat: Lips, mucosa, and tongue normal.    Neck: Supple, symmetrical, trachea midline, no adenopathy, thyroid: no enlargement/tenderness/nodules, no carotid bruit and no JVD. Back:   Symmetric, no curvature. ROM normal. No CVA tenderness. Lungs:   Diminished breath sounds   Chest wall:  No tenderness or deformity. Heart:  Regular rate and rhythm, S1, S2 normal, no murmur, click, rub or gallop. Abdomen:   Soft, non-tender. Bowel sounds normal. No masses,  No organomegaly. Extremities: Extremities normal, atraumatic, no cyanosis or edema. No calf tenderness or cords. Pulses: 2+ and symmetric all extremities. Skin: Skin color, texture, turgor normal. No rashes or lesions   Neurologic: CNII-XII intact. Alert and oriented X 3. Fine motor of hands and fingers normal.   equal.  No cogwheeling or rigidity. Gait not tested at this time. Sensation grossly normal to touch. Gross motor of extremities normal.       Data Review:    CXR 8/31/21   IMPRESSION  New, mild bilateral mid lung and bibasilar interstitial and patchy  airspace disease. Recent Days:  Recent Labs     09/01/21  0133   WBC 8.7   HGB 11.8*   HCT 35.6*        Recent Labs     09/01/21  0133      K 3.8      CO2 30   *   BUN 17   CREA 1.08   CA 8.9   MG 2.0   ALB 3.4*   TBILI 0.3   ALT 35   INR 1.1       24 Hour Results:  No results found for this or any previous visit (from the past 24 hour(s)).     Medications reviewed  Current Facility-Administered Medications   Medication Dose Route Frequency    sodium chloride (NS) flush 5-40 mL  5-40 mL IntraVENous Q8H    sodium chloride (NS) flush 5-40 mL  5-40 mL IntraVENous PRN    polyethylene glycol (MIRALAX) packet 17 g  17 g Oral DAILY PRN    promethazine (PHENERGAN) tablet 12.5 mg  12.5 mg Oral Q6H PRN    Or    ondansetron (ZOFRAN) injection 4 mg  4 mg IntraVENous Q6H PRN    enoxaparin (LOVENOX) injection 30 mg  30 mg SubCUTAneous Q12H    dexAMETHasone (DECADRON) tablet 6 mg  6 mg Oral DAILY    remdesivir 100 mg in 0.9% sodium chloride 250 mL IVPB  100 mg IntraVENous Q24H    hydrALAZINE (APRESOLINE) 20 mg/mL injection 10 mg  10 mg IntraVENous Q6H PRN    aspirin delayed-release tablet 81 mg  81 mg Oral DAILY    losartan (COZAAR) tablet 100 mg  100 mg Oral DAILY    levothyroxine (SYNTHROID) tablet 200 mcg  200 mcg Oral ACB    acetaminophen (TYLENOL) tablet 650 mg  650 mg Oral Q6H PRN    Or    acetaminophen (TYLENOL) suppository 650 mg  650 mg Rectal Q6H PRN    guaiFENesin-dextromethorphan (ROBITUSSIN DM) 100-10 mg/5 mL syrup 5 mL  5 mL Oral Q4H PRN       Care Plan discussed with: Ptient    Total time spent with patient and review of records: 30 minutes.     Marilynn Hayward MD

## 2021-09-02 NOTE — PROGRESS NOTES
9/2/2021   CARE MANAGEMENT NOTE:  CM reviewed EMR and handoff received from ER  (Derik Hairston.). Pt was admitted with COVID. Reportedly, pt resides with his wife Martha Rosa ([phone]). RUR 15%    Transition Plan of Care:  1. Pulmonary following for medical management  2. Plan is for pt to return home with family  3. Home oxygen eval needed prior to discharge  4. Outpt f/  5. Family will transport pt home    CM will continue to follow pt until discharged.   Ameena

## 2021-09-02 NOTE — ADT AUTH CERT NOTES
NOTIF OF INPATIENT ADMISSION   ADMISSION DATE 21    UR CONTACT   JONEL Gan [phone]      [facility]     FACILITY NPI : [id number]     [address]  [phone]            Patient Name :Michael Dobson   : 1965 (56 yrs)  MRN : [id number]     Patient Mailing Address [address] [22] , [address]                                                               .         Insurance Plan Payor: BLUE CROSS / Plan: [facility] PPO / Product Type: PPO /      Primary Coverage Subscriber ID : [id number]      :        Current Patient Class : INPATIENT  Admit Date : 2021     REQUESTED LEVEL OF CARE: INPATIENT [101]                                                           Diagnosis : Pneumonia due to COVID-19 virus                          ICD10 Code : Pneumonia due to COVID-19 virus [U07.1, J12.82]       Admitting and Attending Info:  Admitting Provider : Katerina George MD   NPI: [id number]  Admitting Provider Phone. [phone]  Admitting Provider Address:SAME AS FACILITY            Patient Demographics    Patient Name   Kalyn Reyes Legal Sex   Male    1965 Address   [address] [place] [address] Phone   [phone] [facility])   [phone] (Work)   [phone] [facility]   Hospital Account    Name Acct ID Class Status Primary Coverage   Kalyn Reyes [id number] INPATIENT [facility] PPO          Guarantor Account (for Hospital Account [de-identified])    Name Relation to Pt Service Area Active?  Acct Type   Euarae Eric [facility] Yes Personal/Family   Address Phone     Burke Naveed Haskins  [phone](B)   [phone](N)            Coverage Information (for Hospital Account #[id number])    F/O Payor/Plan Subscriber  Subscriber Sex Precert #   [place]OSS/[place] CROSS [facility] PPO 70 F    Subscriber Subscriber #   Jonathan Leon [id number]   Grp # Group Name   Connie   Address Phone   Jonathan Kahn, [address] [facility]    Policy Number Status Effective Date Benefits Phone   - -  -   Auth/Cert             Diagnosis     Codes Comments   Hypoxia  ICD-10-CM: R09.02   ICD-9-CM: 799.02           Admission Information    Arrival Date/Time: 2021 1824 Admit Date/Time: 2021 0018 IP Adm. Date/Time: 2021 0257   Admission Type: Emergency Point of Origin: Non-health Care Facility/self Admit Category:  Other   Means of Arrival: Car Primary Service: Medicine Secondary Service: N/A   Transfer Source:  Service Area: [facility] Unit: [facility] [place]  MED SURG 2   Admit Provider: Pooja Ambriz MD Attending Provider: Vernon Regan MD Referring Provider:    Admission Information    Attending Provider Admission Dx Admitted on   Ashly Hernandez MD Pneumonia due to COVID-19 virus 21   Service Isolation Code Status   MEDICINE Droplet Plus, Droplet Plus Full Code   Allergies Advance Care Planning    Rosuvastatin Jump to the Activity     Admission Information    [facility]Bed[facility] 5M2 MED SURG  Service: MEDICINE   Admitting provider: Pooja Ambriz MD Phone: [phone]   Attending provider: Ashly Hernandez MD Phone: [phone]   PCP: Car Lackey NP Phone: [phone]   Admission dx:  Patient class: I   Admission type: ER     Patient Demographics    Patient Name   Ilana Potter   [id number] Legal Sex   Male    1965 Address   [address] [place] [address] Phone   [phone] [facility])   [phone] (Work)   [phone] (Mobile)   H&P Notes     H&P by Pooja Ambriz MD at 21 0786 documented on ED to Hosp-Admission (Current) from 2021 in [address] [place] [address]  Author: Pooja Ambriz MD Author Type: Physician Filed: 21 0344   Note Status: Signed Cosign: Cosign Not Required Date of Service: 21 2366   : Cleo Rock MD (Physician)                                                                                                                          [address] [facility], [place], [address]  [phone]     Admission History and Physical        NAME:               Austyn Saucedo   :                     1965   MRN:                     [id number]      PCP:                      Swapnil Kolb NP      Date of service:   2021     Chief  Complaint: SOB     History Of Presenting Illness:        Mr. Savana Webber is a 64 y.o. male who is being admitted for worsening pneumonia due to COVID-19 virus. 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 He is fully vaccinated but exposed to [employer] patients. He had been feeling unwell with expected symptoms after being diagnosed with COVID-19 on the . However, in the past few days, he noted worsening SOB, now with minimal exertion and still associated with a non productive cough and fever. He had been taking some symptomatic medications with tylenol with some relief. He presented to our Emergency Department today where he was noted to be hypoxic. A CXR done showed a new, mild bilateral mid lung and bibasilar interstitial and patchy airspace disease. He will be admitted for further management.      Allergies   Allergen Reactions    Rosuvastatin Myalgia                 Prior to Admission medications    Medication Sig Start Date End Date Taking? Authorizing Provider   levothyroxine (SYNTHROID) 175 mcg tablet TAKE 1 TABLET BY MOUTH ONCE DAILY FOR 90 DAYS 21     Provider, Historical   evolocumab (REPATHA SURECLICK) pen injection 1 mL by SubCUTAneous route every fourteen (14) days. 21     Mag Owusu MD   losartan (COZAAR) 50 mg tablet Take 1 Tab by mouth daily.  21     Balaji Ayala Candie Grice, NP   aspirin delayed-release 81 mg tablet Take 1 Tab by mouth daily. 21     Michaela Lopez NP   nitroglycerin (NITROSTAT) 0.4 mg SL tablet 1 Tab by SubLINGual route every five (5) minutes as needed for Chest Pain. Up to 3 tabs. If pain after 1 tab, seek help. Indications: chest pain 21     Michaela Lopez NP              Past Medical History:   Diagnosis Date    Arthritis       ankles, feet, hands    CAD (coronary artery disease)       s/p cath on 14 with 80% prox/mid LAD lesion;   --> PCI to RCA (2 ANNA)    Cancer (HCC)       tonsil cancer    Dyslipidemia      GERD (gastroesophageal reflux disease)      Hypertension      Hypothyroidism      Psychiatric disorder       depression    Seizures ([facility] [address]                Past Surgical History:   Procedure Laterality Date    HX HEENT         tonsillectomy for malignant tumor    HX OTHER SURGICAL         tonsil cancer (removed)    VT CARDIAC SURG PROCEDURE UNLIST   2015     stent         Social History            Tobacco Use    Smoking status: Former Smoker       Packs/day: 2.00       Years: 38.00       Pack years: 76.00       Quit date:        Years since quittin.6    Smokeless tobacco: Former User       Quit date: 1980    Tobacco comment: Started at age 12 or 16   Substance Use Topics    Alcohol use: Yes       Alcohol/week: 2.0 - 3.0 standard drinks       Types: 2 - 3 Cans of beer per week       Comment: twice a week when eating out                Family History   Problem Relation Age of Onset    Thyroid Disease Mother      Heart Attack Father      Heart Disease Father           cabg  x4 vessel    Hypertension Father      Anesth Problems Neg Hx           Review of Systems:     Constitutional ROS: + fever, + chills, + rigors and no night sweats  Respiratory ROS: + cough, - sputum, - hemoptysis, + dyspnea and no pleuritic pain.   Cardiovascular ROS: + chest pain but no palpitations, orthopnea, PND or syncope  Endocrine ROS: no polydispsia, polyuria, heat or cold intolerance or major weight change. Gastrointestinal ROS: no dysphagia, abdominal pain, nausea, vomiting or diarrhea    Genito-Urinary ROS: no dysuria, frequency, hematuria, retention or flank pain  Musculoskeletal ROS: no joint pain, swelling or muscular tenderness  Neurological ROS: no headache, confusion, focal weakness but now resolving loss of taste and smell   Psychiatric ROS: no depression but + anxiety. No mood swings  Dermatological ROS: no rash, pruritis, or urticaria  Heme-Lymph ROS: no swollen glands, bleeding     Examination:     Constitutional:    Visit Vitals  BP (!) 147/72   Pulse 100   Temp 98.2 °F (36.8 °C)   Resp 25   SpO2 93%         General:  Weak and ill looking patient, anxious but not in any acute distress   Eyes: Pink conjunctivae, PERRLA with no discharge. Normal eye movements  Ear, Nose, Mouth & Throat: No ottorrhea, rhinorrhea, non tender sinuses, dry mucous membranes  Respiratory:  + accessory muscle use, decreased clear breath sounds with minimal crackles. No wheezes  Cardiovascular:  No JVD or murmurs, regular and normal S1, S2 without thrills, bruits. + peripheral edema. GI & :  Soft abdomen, obese, non-tender, non-distended, normoactive bowel sounds  Heme:  No cervical or axillary adenopathy.    Musculoskeletal:  No cyanosis, clubbing, atrophy or deformities  Skin:  No rashes, bruising or ulcers   Neurological: Awake and alert, speech is clear, CNs 2-12 are grossly intact and otherwise non focal  Psychiatric:  Has a good insight and is oriented x 3  ________________________________________________________________________     Data Review:     Labs:         Recent Labs     09/01/21  0133   WBC 8.7   HGB 11.8*   HCT 35.6*             Recent Labs     09/01/21 0133      K 3.8      CO2 30   *   BUN 17   CREA 1.08   CA 8.9   MG 2.0   ALB 3.4*   ALT 35      No components found for: GLPOC  No results for input(s): PH, PCO2, PO2, HCO3, FIO2 in the last 72 hours. Recent Labs     09/01/21  0133   INR 1.1      Imaging Studies:       Chest X-ray - reviewed      I have also reviewed available old medical records.      Assessment & Impression:     Mr. Luis Healy is a 64 y.o. male being evaluated for:      Principal Problem:    Pneumonia due to COVID-19 virus (9/1/2021)     Active Problems:    CAD (coronary artery disease) (12/5/2018)       HTN (hypertension), benign ()       Hypothyroidism ()       Dyslipidemia ()       Hypoxia (9/1/2021)           Plan of management:     Pneumonia due to COVID-19 virus (9/1/2021) /  Hypoxia (9/1/2021) POA: Has severe breakthrough disease. He is fully vaccinated. Diagnosed 8/26. Now with worsening dyspnea. Admit to hospital due to risk of deterioration. Start IV Dexamethasone, IV Remdesivir, Supplemental oxygen. CRP 33. Get other inflammatory markers and follow clinical progress. Consult Pulm for Actemra.      CAD (coronary artery disease) (12/5/2018) / Dyslipidemia POA: he has stents. Troponin neg. Resume Asprin, NTG PRN     HTN (hypertension), benign POA: BP elevated.  Resume Losartan but at an increased dose     Hypothyroidism POA: check TSH and resume Levothyroxine     Code Status:  Full     Surrogate decision maker: Family     Risk of deterioration: high             Total time spent for the care of the patient: Diego Min discussed with: Patient, Nursing Staff and ED physician     Discussed:  Code Status, Care Plan and D/C Planning     Prophylaxis:  Lovenox     Probable Disposition:  Home w/Family                                                                                               ___________________________________________________     Attending Physician:       Shayne Stern MD             Patient Demographics    Patient Name Aaron [place]   [id number] Legal Sex   Male    1965 Address   [address] [address] Phone   [phone] (Home)   [phone] (Work)   [phone] (Mobile)   CSN:   [id number]   Admit Date: Admit Time Room Bed   Sep 1, 2021 12:18  [14450] 01 [62530]   Attending Providers    Provider Pager From To   [facility] MD  21   Alisson Triana DO  21   Kate Rojas [phone] 21   Gabriella Pennington [phone] 21   Kate Rojas [phone] 21   Gabriella Pennington [phone] 21    Emergency Contact(s)    Name Relation Home Work Mobile   Nilesh Ramírez Spouse [phone]   Utilization Reviews       Viral Illness, Acute - Care Day 2 (2021) by Gallito Lopez       Review Entered Review Status   2021 12:37 Completed      Criteria Review      Care Day: 2 Care Date: 2021 Level of Care: Inpatient Floor    Guideline Day 2    Clinical Status    (X) * Hypotension absent    2021 12:37:23 EDT by Tracey Bonilla 136/80    (X) * No requirement for mechanical ventilation    2021 12:37:23 EDT by Tracey Bonilla none    ( ) * Oxygenation at baseline or improved    (X) * Mental status at baseline    2021 12:37:23 EDT by Tracey Bonilla Neurologic:CNII-XII intact.  Alert and oriented X 3.  Fine motor of hands and fingers normal.   equal.  No cogwheeling or rigidity.  Gait not tested at this time.  Sensation grossly normal to touch.  Gross motor of extremities normal.    Routes    (X) * Oral hydration    2021 12:37:23 EDT by Rishi Mcgowan      usual diet    Interventions    (X) Possible isolation    2021 12:37:23 EDT by Rishi Mcgowan      droplet plus    (X) Pulse oximetry    2021 12:37:23 EDT by Rishi Mcgowan      cont    (X) Possible oxygen    2021 12:37:23 EDT by Adolph Marcial      4L nc    Medications    (X) Possible antiviral medication    9/2/2021 12:37:23 EDT by Adolph Marcial      Remdesivir 100mg IV x1    * Milestone   Additional Notes   9/2   VS- 97.3 69 18 96% 136/80    RBC 4.09 (L)   HGB 11.8 (L)   HCT 35.6 (L)   NEUTROPHILS 79 (H)   Sodium 134 (L)   Glucose 155 (H)   BUN 23 (H)   BUN/Creatinine ratio 25 (H)   Albumin 3.0 (L)   Globulin 4.6 (H)   A-G Ratio 0.7 (L)   Alk. phosphatase 40 (L)   C-Reactive protein 16.80 (H)      Meds: asa 81mg po x1, Decadron 6mg PO x1, lovenox 30mg sc x1, synthroid 200mcg po x1, cozaar 100mg po x1,       :On 4L NC. He is c/o pain in his neck/throat with expiration. No pain with swallowing. Cough present and prod at times. Afebrile.          Assessment/Plan:   Pneumonia due to COVID-19 virus (9/1/2021) / Robert Hasten (9/1/2021) POA: Has severe breakthrough disease. He is fully vaccinated. Diagnosed 8/24. Now with worsening dyspnea.  -IV Dexamethasone   -IV Remdesivir,    -Supplemental oxygen. -CRP 33.    -Get other inflammatory markers    -Consult Pulm .        CAD (coronary artery disease) (12/5/2018) / Dyslipidemia POA: he has stents. -Troponin neg.    -Resume Asprin, NTG PRN       HTN (hypertension), benign POA: BP elevated.     -Resume Losartan but at an increased dose       Hypothyroidism POA:    -check TSH and resume Levothyroxine               Viral Illness, Acute - Care Day 1 (9/1/2021) by Adolph SEGOVIA       Review Entered Review Status   9/2/2021 09:36 Completed      Criteria Review      Care Day: 1 Care Date: 9/1/2021 Level of Care: Inpatient Floor    Guideline Day 1    Clinical Status    (X) * Clinical Indications met    Routes    (X) Oral or IV hydration    9/2/2021 09:36:50 EDT by Adolph Marcial      Decadron 6mg po x1,    (X) Liquid or usual diet    Interventions    (X) Possible isolation    9/2/2021 09:36:50 EDT by Adolph Marcial      droplet plus    (X) CBC with differential, chemistries, renal and hepatic function testing, C-reactive protein, coagulation panel    9/2/2021 09:36:50 EDT by Roby Rubio      RBC3.97 (L)  HGB11.8 (L)  HCT35.6 (L)  [id number] (H)    (X) Possible oxygen    9/2/2021 09:36:50 EDT by Roby Rubio      2L nc    (X) Possible chest x-ray    9/2/2021 09:36:50 EDT by Craig Oconnor Maric[address] 25, mild bilateral mid lung and bibasilar interstitial and patchy  airspace disease. Medications    (X) Possible antiviral medication    9/2/2021 09:36:50 EDT by Roby Rubio      Remdesivir 200mg IV x1,    * Milestone   Additional Notes   9/1   VS- 98.2 97 25 89% 141/73 2L nc                LYMPHOCYTES 11 (L)    MONOCYTES 4 (L)    IMMATURE GRANULOCYTES 2 (H)    ABS. IMM. GRANS. 0.2 (H)    Prothrombin time 11.2 (H)    Fibrinogen >800 (H)    Glucose 116 (H)    Albumin 3.4 (L)    Globulin 4.6 (H)    A-G Ratio 0.7 (L)    Alk. phosphatase 54     (H) 309 (H)   Ferritin 649 (H) 648 (H)   C-Reactive protein 33.20 (H) 34.00 (H)   TSH 12.70 (H)      Sinus rhythm with short AL    ST & T wave abnormality, consider anterior ischemia    Abnormal ECG       Tylenol 650mg po x1, Asa 81mg po x1, Lovenox 6mg po x1, apresoline 10mg IV x1, Cozaar 100mg po x1, synthroid 175mcg po x1      Mr. Jessica Galvez is a 64 y.o. male who is being admitted for worsening pneumonia due to COVID-19 virus. 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 He is fully vaccinated but exposed to [employer] patients. He had been feeling unwell with expected symptoms after being diagnosed with COVID-19 on the 8/26. However, in the past few days, he noted worsening SOB, now with minimal exertion and still associated with a non productive cough and fever. He had been taking some symptomatic medications with tylenol with some relief. He presented to our Emergency Department today where he was noted to be hypoxic. A CXR done showed a new, mild bilateral mid lung and bibasilar interstitial and patchy airspace disease.  He will be admitted for further management.     General:  Weak and ill looking patient, anxious but not in any acute distress    Eyes: Pink conjunctivae, PERRLA with no discharge. Normal eye movements   Ear, Nose, Mouth & Throat: No ottorrhea, rhinorrhea, non tender sinuses, dry mucous membranes   Respiratory:  + accessory muscle use, decreased clear breath sounds with minimal crackles. No wheezes   Cardiovascular:  No JVD or murmurs, regular and normal S1, S2 without thrills, bruits. + peripheral edema. GI & :  Soft abdomen, obese, non-tender, non-distended, normoactive bowel sounds   Heme:  No cervical or axillary adenopathy. Musculoskeletal:  No cyanosis, clubbing, atrophy or deformities   Skin:  No rashes, bruising or ulcers    Neurological: Awake and alert, speech is clear, CNs 2-12 are grossly intact and otherwise non focal   Psychiatric:  Has a good insight and is oriented x 3   Assessment & Impression:       Mr. Theresa Carlos is a 64 y.o. male being evaluated for:        Principal Problem:     Pneumonia due to COVID-19 virus (9/1/2021)       Active Problems:     CAD (coronary artery disease) (12/5/2018)         HTN (hypertension), benign ()         Hypothyroidism ()         Dyslipidemia ()         Hypoxia (9/1/2021)       Plan of management:       Pneumonia due to COVID-19 virus (9/1/2021) / Frida Bleacher (9/1/2021) POA: Has severe breakthrough disease. He is fully vaccinated. Diagnosed 8/26. Now with worsening dyspnea. Admit to hospital due to risk of deterioration. Start IV Dexamethasone, IV Remdesivir, Supplemental oxygen. CRP 33. Get other inflammatory markers and follow clinical progress. Consult Pulm for Actemra.        CAD (coronary artery disease) (12/5/2018) / Dyslipidemia POA: he has stents. Troponin neg. Resume Asprin, NTG PRN       HTN (hypertension), benign POA: BP elevated.  Resume Losartan but at an increased dose       Hypothyroidism POA: check TSH and resume Levothyroxine       Code Status:  Full       Surrogate decision maker: Family       Risk of deterioration: high               Total time spent for the care of the patient: 79 Minutes                                                                                                                                         Care Plan discussed with: Patient, Nursing Staff and ED physician       Discussed:  Code Status, Care Plan and D/C Planning       Prophylaxis:  Lovenox       Probable Disposition:  Home w/Family                                                                                                    Pulmonary consult   Impression Plan   1. Acute respiratory failure   2. Hypoxia   3. COVID 19 PNA   4. HTN   5. CAD                             \" Wean O2 to keep sats above 90%   \" Continue remdesivir   \" Continue dexamethasone   \" Pt only on 2 liters and doing slightly better. Will hold on actemra with low threshold to give if oxygenation worsens. \" Self proning 3 hrs at night. \" Hobert Patient into chair as much as possible       63 yo with PMHx of CAD presenting with increasing SOB in the last 2 days. He was diagnosed with COVID 19 on 8/24. 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 Hypoxic on arrival to the ED. Currently on 2 liters. Feels as if he can take deeper breaths this AM after starting Dexamethasone.        EXAM:   GENERAL: awake, alert, on NC, HEENT:  PERRL, EOMI, no alar flaring or epistaxis, oral mucosa moist without cyanosis, NECK:  no jugular vein distention, no retractions, no thyromegaly or masses, LUNGS: CTA, no w/r/r HEART:  Regular rate and rhythm with no MGR; no edema is present, ABDOMEN:  soft with no tenderness, bowel sounds present, EXTREMITIES:  warm with no cyanosis, SKIN:  no jaundice or ecchymosis and NEUROLOGIC:  alert and oriented, grossly non-focal      Transition of Care Plan:                1. Home with family assistance   2. PCP follow up   3. AD planning   4.  CM to follow for discharge planning               Viral Illness, Acute - Clinical Indications for Admission to Inpatient Care by Deb Rashid       Review Entered Review Status   9/2/2021 09:33 Completed      Criteria Review      Clinical Indications for Admission to Inpatient Care    Most Recent : Wanda Cueva Most Recent Date: 9/2/2021 09:33:48 EDT    (X) Admission is indicated for  1 or more  of the following  [A] [B] (1) (2) (3) (4) (5) (6) (7)    (8) (9):       (X) Pulmonary manifestation, as indicated by  1 or more  of the following :          (X) Hypoxemia          9/2/2021 09:33:48 EDT by Wanda Cueva            89%          (X) Severe tachypnea (eg, respiratory rate greater than 24 breaths per minute [C]) (13)          9/2/2021 09:33:48 EDT by Wanda Cueva            RR 25          (X) Tachypnea that persists despite observation care

## 2021-09-02 NOTE — PROGRESS NOTES
Bedside shift change report given to Alberto Melara (oncoming nurse) by Natasha Pedraza (offgoing nurse). Report included the following information SBAR, Kardex, Intake/Output, MAR and Recent Results.

## 2021-09-02 NOTE — PROGRESS NOTES
[facility]     Name: Clementine Barthel MRN: [id number]   : 1965 Hospital: [address]   Date: 2021        Impression Plan   1. Acute respiratory failure  2. Hypoxia  3. COVID 19 PNA  4. HTN  5. CAD               · Wean O2 to keep sats above 90%  · Continue remdesivir  · Continue dexamethasone  · Give dose of Actemra as O2 need have increased to 4L. Pharmacy consulted. · Self proning 3 hrs at night. · OOB into chair as much as possible           Radiology  (personally reviewed) CXR  reviewed: bilateral infiltrates       Subjective     Cc: shortness of breath    65 yo with PMHx of CAD presenting with increasing SOB in the last 2 days. He was diagnosed with COVID 19 on . 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 Hypoxic on arrival to the ED. Currently on 2 liters. Feels as if he can take deeper breaths this AM after starting Dexamethasone. Interval history  Afebrile  BP stable/improved  Sats 94-98% on 4L  CRP 16.80    Review of Systems: Reports feeling very winded with activity. Desats to the mid [de-identified] off O2. Has some neck/upper chest discomfort with deep inspiration. Denies fever or chills. Denies abd pain or LE pain/swelling. A comprehensive review of systems was negative except for that written in the HPI.     Past Medical History:   Diagnosis Date    Arthritis     ankles, feet, hands    CAD (coronary artery disease)     s/p cath on 14 with 80% prox/mid LAD lesion;   --> PCI to RCA (2 ANNA)    Cancer ([facility] [address]     tonsil cancer    Dyslipidemia     GERD (gastroesophageal reflux disease)     Hypertension     Hypothyroidism     Psychiatric disorder     depression    Seizures ([facility] [address]       Past Surgical History:   Procedure Laterality Date    HX HEENT      tonsillectomy for malignant tumor    HX OTHER SURGICAL      tonsil cancer (removed)    [place] CARDIAC SURG PROCEDURE UNLIST  2015    stent      Prior to Admission medications Medication Sig Start Date End Date Taking? Authorizing Provider   levothyroxine (SYNTHROID) 200 mcg tablet Take 200 mcg by mouth Daily (before breakfast). Yes Provider, Historical   ondansetron (Zofran ODT) 4 mg disintegrating tablet Take 4 mg by mouth every eight (8) hours as needed for Nausea or Vomiting. Yes Provider, Historical   evolocumab (REPATHA SURECLICK) pen injection 1 mL by SubCUTAneous route every fourteen (14) days. 21  Yes Jeffrey Wells MD   losartan (COZAAR) 50 mg tablet Take 1 Tab by mouth daily. 21  Yes Mahamed Bolivar B, NP   aspirin delayed-release 81 mg tablet Take 1 Tab by mouth daily. 21  Yes Carloz Méndez NP   nitroglycerin (NITROSTAT) 0.4 mg SL tablet 1 Tab by SubLINGual route every five (5) minutes as needed for Chest Pain. Up to 3 tabs. If pain after 1 tab, seek help. Indications: chest pain 21  Yes Carloz Méndez NP     Current Facility-Administered Medications   Medication Dose Route Frequency    tocilizumab (ACTEMRA) 810 mg in 0.9% sodium chloride 100 mL infusion  810 mg IntraVENous ONCE    sodium chloride (NS) flush 5-40 mL  5-40 mL IntraVENous Q8H    enoxaparin (LOVENOX) injection 30 mg  30 mg SubCUTAneous Q12H    dexAMETHasone (DECADRON) tablet 6 mg  6 mg Oral DAILY    remdesivir 100 mg in 0.9% sodium chloride 250 mL IVPB  100 mg IntraVENous Q24H    aspirin delayed-release tablet 81 mg  81 mg Oral DAILY    losartan (COZAAR) tablet 100 mg  100 mg Oral DAILY    levothyroxine (SYNTHROID) tablet 200 mcg  200 mcg Oral ACB     Allergies   Allergen Reactions    Rosuvastatin Myalgia      Social History     Tobacco Use    Smoking status: Former Smoker     Packs/day: 2.00     Years: 38.00     Pack years: 76.00     Quit date:      Years since quittin.6    Smokeless tobacco: Former User     Quit date: 1980    Tobacco comment: Started at age 12 or 16   Substance Use Topics    Alcohol use:  Yes     Alcohol/week: 2.0 - 3.0 standard drinks     Types: 2 - 3 Cans of beer per week     Comment: twice a week when eating out       Family History   Problem Relation Age of Onset    Thyroid Disease Mother     Heart Attack Father     Heart Disease Father         cabg  x4 vessel    Hypertension Father     Anesth Problems Neg Hx           Laboratory: I have personally reviewed the flowsheet and labs.      Recent Labs     09/02/21 0528 09/01/21 0133   WBC 7.7 8.7   HGB 11.8* 11.8*   HCT 35.6* 35.6*    203     Recent Labs     09/02/21 0528 09/01/21 0133   * 136   K 3.7 3.8    100   CO2 26 30   * 116*   BUN 23* 17   CREA 0.93 1.08   CA 8.5 8.9   MG  --  2.0   ALB 3.0* 3.4*   ALT 32 35   INR  --  1.1       Objective:     Visit Vitals  /80 (BP 1 Location: Left upper arm, BP Patient Position: At rest)   Pulse 69   Temp 97.3 °F (36.3 °C)   Resp 18   Ht 5' 10\" (1.778 m)   SpO2 96%   BMI 37.64 kg/m²         Intake/Output Summary (Last 24 hours) at 9/2/2021 1252  Last data filed at 9/2/2021 3833  Gross per 24 hour   Intake 450 ml   Output 0 ml   Net 450 ml     EXAM:   GENERAL: awake, alert, on NC, HEENT:  anicteric, EOMI, no alar flaring or epistaxis, oral mucosa moist without cyanosis, NECK:  no jugular vein distention, no retractions, no thyromegaly or masses, LUNGS: CTA, no w/r/r HEART:  Regular rate and rhythm with no MGR; no edema is present, ABDOMEN:  soft with no tenderness, bowel sounds present, EXTREMITIES:  warm with no cyanosis, SKIN:  no jaundice or ecchymosis and NEUROLOGIC:  alert and oriented, grossly non-focal    JUAN CARLOS Momin  [facility]

## 2021-09-03 ENCOUNTER — APPOINTMENT (OUTPATIENT)
Dept: GENERAL RADIOLOGY | Age: 56
DRG: 177 | End: 2021-09-03
Attending: PHYSICIAN ASSISTANT
Payer: COMMERCIAL

## 2021-09-03 LAB
BNP SERPL-MCNC: 455 PG/ML
COMMENT, HOLDF: NORMAL
CRP SERPL-MCNC: 6.32 MG/DL (ref 0–0.6)
SAMPLES BEING HELD,HOLD: NORMAL

## 2021-09-03 PROCEDURE — 86140 C-REACTIVE PROTEIN: CPT

## 2021-09-03 PROCEDURE — 83880 ASSAY OF NATRIURETIC PEPTIDE: CPT

## 2021-09-03 PROCEDURE — 74011250637 HC RX REV CODE- 250/637: Performed by: FAMILY MEDICINE

## 2021-09-03 PROCEDURE — 74011250636 HC RX REV CODE- 250/636: Performed by: FAMILY MEDICINE

## 2021-09-03 PROCEDURE — 74011250637 HC RX REV CODE- 250/637: Performed by: INTERNAL MEDICINE

## 2021-09-03 PROCEDURE — 74011250636 HC RX REV CODE- 250/636: Performed by: INTERNAL MEDICINE

## 2021-09-03 PROCEDURE — 71045 X-RAY EXAM CHEST 1 VIEW: CPT

## 2021-09-03 PROCEDURE — 74011000250 HC RX REV CODE- 250: Performed by: INTERNAL MEDICINE

## 2021-09-03 PROCEDURE — 65270000029 HC RM PRIVATE

## 2021-09-03 PROCEDURE — 74011000258 HC RX REV CODE- 258: Performed by: INTERNAL MEDICINE

## 2021-09-03 RX ORDER — AMLODIPINE BESYLATE 5 MG/1
5 TABLET ORAL DAILY
Status: DISCONTINUED | OUTPATIENT
Start: 2021-09-03 | End: 2021-09-05 | Stop reason: HOSPADM

## 2021-09-03 RX ORDER — ENOXAPARIN SODIUM 100 MG/ML
40 INJECTION SUBCUTANEOUS EVERY 12 HOURS
Status: DISCONTINUED | OUTPATIENT
Start: 2021-09-03 | End: 2021-09-05 | Stop reason: HOSPADM

## 2021-09-03 RX ADMIN — Medication 10 ML: at 06:50

## 2021-09-03 RX ADMIN — Medication 10 ML: at 17:10

## 2021-09-03 RX ADMIN — LEVOTHYROXINE SODIUM 200 MCG: 0.1 TABLET ORAL at 06:53

## 2021-09-03 RX ADMIN — LOSARTAN POTASSIUM 100 MG: 50 TABLET, FILM COATED ORAL at 08:49

## 2021-09-03 RX ADMIN — AMLODIPINE BESYLATE 5 MG: 5 TABLET ORAL at 08:49

## 2021-09-03 RX ADMIN — ENOXAPARIN SODIUM 30 MG: 30 INJECTION SUBCUTANEOUS at 06:49

## 2021-09-03 RX ADMIN — ASPIRIN 81 MG: 81 TABLET, COATED ORAL at 08:49

## 2021-09-03 RX ADMIN — Medication 10 ML: at 22:36

## 2021-09-03 RX ADMIN — ENOXAPARIN SODIUM 40 MG: 40 INJECTION SUBCUTANEOUS at 17:08

## 2021-09-03 RX ADMIN — DEXAMETHASONE 6 MG: 6 TABLET ORAL at 08:49

## 2021-09-03 RX ADMIN — REMDESIVIR 100 MG: 100 INJECTION, POWDER, LYOPHILIZED, FOR SOLUTION INTRAVENOUS at 06:53

## 2021-09-03 RX ADMIN — HYDRALAZINE HYDROCHLORIDE 10 MG: 20 INJECTION INTRAMUSCULAR; INTRAVENOUS at 22:51

## 2021-09-03 NOTE — PROGRESS NOTES
Bedside and Verbal shift change report given to GERARDO Vargas (oncoming nurse) by Leida Acuña (offgoing nurse). Report included the following information SBAR, Kardex, Intake/Output, MAR, Recent Results and Med Rec Status.

## 2021-09-03 NOTE — PROGRESS NOTES
Daily Progress Note: 9/3/2021  Tristen Salazar NP    Assessment/Plan:   Pneumonia due to COVID-19 virus (9/1/2021) /  Hypoxia (9/1/2021) POA: Has severe breakthrough disease. He is fully vaccinated. Diagnosed 8/24. Now with worsening dyspnea. -IV Dexamethasone  -IV Remdesivir   -Supplemental oxygen. -CRP 33 on admission. Improving  -Consult Pulm. Actemra 9/2     CAD (coronary artery disease) (12/5/2018) / Dyslipidemia POA: he has stents. -Troponin neg.   -Resume Asprin, NTG PRN     HTN (hypertension), benign POA: BP elevated. -Resume Losartan but at an increased dose     Hypothyroidism POA:   -check TSH and resume Levothyroxine          Problem List:  Problem List as of 9/3/2021 Date Reviewed: 4/22/2021        Codes Class Noted - Resolved    CAD (coronary artery disease) ICD-10-CM: I25.10  ICD-9-CM: 414.00  12/5/2018 - Present        * (Principal) Pneumonia due to COVID-19 virus ICD-10-CM: U07.1, J12.82  ICD-9-CM: 480.8, 079.89  9/1/2021 - Present        Hypoxia ICD-10-CM: R09.02  ICD-9-CM: 799.02  9/1/2021 - Present        HTN (hypertension), benign ICD-10-CM: I10  ICD-9-CM: 401.1  Unknown - Present        Hypothyroidism ICD-10-CM: E03.9  ICD-9-CM: 244.9  Unknown - Present        Dyslipidemia ICD-10-CM: E78.5  ICD-9-CM: 272.4  Unknown - Present        Post PTCA ICD-10-CM: Z98.61  ICD-9-CM: V45.82  12/5/2018 - Present        Malignant neoplasm of tonsil ([facility] [address] ICD-10-CM: C09.9  ICD-9-CM: 146.0  4/10/2017 - Present        Unstable angina ([facility] [address] ICD-10-CM: I20.0  ICD-9-CM: 411.1  9/29/2014 - Present        Family history of early CAD ICD-10-CM: Z82.49  ICD-9-CM: V17.3  9/29/2014 - Present        History of tobacco use ICD-10-CM: K98.470  ICD-9-CM: V15.82  9/29/2014 - Present        RESOLVED: HTN (hypertension) ICD-10-CM: I10  ICD-9-CM: 401.9  9/29/2014 - 8/3/2021              HPI:   Mr. Yuly Cooper is a 64 y.o. male who is being admitted for worsening pneumonia due to COVID-19 virus.  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 He is fully vaccinated but exposed to [employer] patients. He had been feeling unwell with expected symptoms after being diagnosed with COVID-19 on the . However, in the past few days, he noted worsening SOB, now with minimal exertion and still associated with a non productive cough and fever. He had been taking some symptomatic medications with tylenol with some relief. He presented to our Emergency Department today where he was noted to be hypoxic. A CXR done showed a new, mild bilateral mid lung and bibasilar interstitial and patchy airspace disease. He will be admitted for further management. (Dr Ana Woods)     : Feeling better this am. On 2L NC. More cough and SOB with activity. Pulm following.      : On 4L NC. He is c/o pain in his neck/throat with expiration. No pain with swallowing. Cough present and prod at times. Afebrile. 9/3: Given Actemra yesterday given his increasing oxygen need. Remains at 4L. CRP is improving. BP elevated. Losartan has been increased so will add Amlodipine. Review of Systems:   A comprehensive review of systems was negative except for that written in the HPI. Objective:   Physical Exam:     Visit Vitals  BP (!) 170/78 (BP 1 Location: Left upper arm, BP Patient Position: At rest)   Pulse 80   Temp 98.7 °F (37.1 °C)   Resp 16   Ht 5' 10\" (1.778 m)   SpO2 97%   BMI 37.64 kg/m²    O2 Flow Rate (L/min): 4 l/min O2 Device: Nasal cannula    Temp (24hrs), Av.1 °F (36.7 °C), Min:97.3 °F (36.3 °C), Max:98.7 °F (37.1 °C)    1901 -  0700  In: 250 [P.O.:250]  Out: -    701 -  190  In: 450 [P.O.:200; I.V.:250]  Out: 0     General:  Alert, cooperative, no distress, appears stated age. Head:  Normocephalic, without obvious abnormality, atraumatic. Eyes:  Conjunctivae/corneas clear. Nose: Nares normal. Septum midline. Mucosa normal. No drainage or sinus tenderness.    Throat: Lips, mucosa, and tongue normal.    Neck: Supple, symmetrical, trachea midline, no adenopathy, thyroid: no enlargement/tenderness/nodules, no carotid bruit and no JVD. Back:   Symmetric, no curvature. ROM normal. No CVA tenderness. Lungs:   Diminished breath sounds   Chest wall:  No tenderness or deformity. Heart:  Regular rate and rhythm, S1, S2 normal, no murmur, click, rub or gallop. Abdomen:   Soft, non-tender. Bowel sounds normal. No masses,  No organomegaly. Extremities: Extremities normal, atraumatic, no cyanosis or edema. No calf tenderness or cords. Pulses: 2+ and symmetric all extremities. Skin: Skin color, texture, turgor normal. No rashes or lesions   Neurologic: CNII-XII intact. Alert and oriented X 3. Fine motor of hands and fingers normal.   equal.  No cogwheeling or rigidity. Gait not tested at this time. Sensation grossly normal to touch. Gross motor of extremities normal.       Data Review:    CXR 8/31/21   IMPRESSION  New, mild bilateral mid lung and bibasilar interstitial and patchy  airspace disease.   Recent Days:  Recent Labs     09/02/21 0528 09/01/21  0133   WBC 7.7 8.7   HGB 11.8* 11.8*   HCT 35.6* 35.6*    203     Recent Labs     09/02/21 0528 09/01/21  0133   * 136   K 3.7 3.8    100   CO2 26 30   * 116*   BUN 23* 17   CREA 0.93 1.08   CA 8.5 8.9   MG  --  2.0   ALB 3.0* 3.4*   TBILI 0.3 0.3   ALT 32 35   INR  --  1.1       24 Hour Results:  Recent Results (from the past 24 hour(s))   C REACTIVE PROTEIN, QT    Collection Time: 09/02/21  5:28 AM   Result Value Ref Range    C-Reactive protein 16.80 (H) 0.00 - 0.60 mg/dL   CBC WITH AUTOMATED DIFF    Collection Time: 09/02/21  5:28 AM   Result Value Ref Range    WBC 7.7 4.1 - 11.1 K/uL    RBC 4.09 (L) 4.10 - 5.70 M/uL    HGB 11.8 (L) 12.1 - 17.0 g/dL    HCT 35.6 (L) 36.6 - 50.3 %    MCV 87.0 80.0 - 99.0 FL    MCH 28.9 26.0 - 34.0 PG    MCHC 33.1 30.0 - 36.5 g/dL    RDW 12.4 11.5 - 14.5 %    PLATELET 153 820 - 157 K/uL    MPV 10.3 8.9 - 12.9 FL    NRBC 0.0 0  WBC    ABSOLUTE NRBC 0.00 0.00 - 0.01 K/uL    NEUTROPHILS 79 (H) 32 - 75 %    LYMPHOCYTES 13 12 - 49 %    MONOCYTES 8 5 - 13 %    EOSINOPHILS 0 0 - 7 %    BASOPHILS 0 0 - 1 %    IMMATURE GRANULOCYTES 0 0.0 - 0.5 %    ABS. NEUTROPHILS 6.1 1.8 - 8.0 K/UL    ABS. LYMPHOCYTES 1.0 0.8 - 3.5 K/UL    ABS. MONOCYTES 0.6 0.0 - 1.0 K/UL    ABS. EOSINOPHILS 0.0 0.0 - 0.4 K/UL    ABS. BASOPHILS 0.0 0.0 - 0.1 K/UL    ABS. IMM. GRANS. 0.0 0.00 - 0.04 K/UL    DF AUTOMATED     METABOLIC PANEL, COMPREHENSIVE    Collection Time: 09/02/21  5:28 AM   Result Value Ref Range    Sodium 134 (L) 136 - 145 mmol/L    Potassium 3.7 3.5 - 5.1 mmol/L    Chloride 100 97 - 108 mmol/L    CO2 26 21 - 32 mmol/L    Anion gap 8 5 - 15 mmol/L    Glucose 155 (H) 65 - 100 mg/dL    BUN 23 (H) 6 - 20 MG/DL    Creatinine 0.93 0.70 - 1.30 MG/DL    BUN/Creatinine ratio 25 (H) 12 - 20      GFR est AA >60 >60 ml/min/1.73m2    GFR est non-AA >60 >60 ml/min/1.73m2    Calcium 8.5 8.5 - 10.1 MG/DL    Bilirubin, total 0.3 0.2 - 1.0 MG/DL    ALT (SGPT) 32 12 - 78 U/L    AST (SGOT) 20 15 - 37 U/L    Alk. phosphatase 40 (L) 45 - 117 U/L    Protein, total 7.6 6.4 - 8.2 g/dL    Albumin 3.0 (L) 3.5 - 5.0 g/dL    Globulin 4.6 (H) 2.0 - 4.0 g/dL    A-G Ratio 0.7 (L) 1.1 - 2.2     SAMPLES BEING HELD    Collection Time: 09/02/21  5:28 AM   Result Value Ref Range    SAMPLES BEING HELD 1SST     COMMENT        Add-on orders for these samples will be processed based on acceptable specimen integrity and analyte stability, which may vary by analyte.    PROCALCITONIN    Collection Time: 09/02/21  4:21 PM   Result Value Ref Range    Procalcitonin 2.41 ng/mL       Medications reviewed  Current Facility-Administered Medications   Medication Dose Route Frequency    sodium chloride (NS) flush 5-40 mL  5-40 mL IntraVENous Q8H    sodium chloride (NS) flush 5-40 mL  5-40 mL IntraVENous PRN    polyethylene glycol (MIRALAX) packet 17 g  17 g Oral DAILY PRN  promethazine (PHENERGAN) tablet 12.5 mg  12.5 mg Oral Q6H PRN    Or    ondansetron (ZOFRAN) injection 4 mg  4 mg IntraVENous Q6H PRN    enoxaparin (LOVENOX) injection 30 mg  30 mg SubCUTAneous Q12H    dexAMETHasone (DECADRON) tablet 6 mg  6 mg Oral DAILY    remdesivir 100 mg in 0.9% sodium chloride 250 mL IVPB  100 mg IntraVENous Q24H    hydrALAZINE (APRESOLINE) 20 mg/mL injection 10 mg  10 mg IntraVENous Q6H PRN    aspirin delayed-release tablet 81 mg  81 mg Oral DAILY    losartan (COZAAR) tablet 100 mg  100 mg Oral DAILY    levothyroxine (SYNTHROID) tablet 200 mcg  200 mcg Oral ACB    acetaminophen (TYLENOL) tablet 650 mg  650 mg Oral Q6H PRN    Or    acetaminophen (TYLENOL) suppository 650 mg  650 mg Rectal Q6H PRN    guaiFENesin-dextromethorphan (ROBITUSSIN DM) 100-10 mg/5 mL syrup 5 mL  5 mL Oral Q4H PRN       Care Plan discussed with: Ptient    Total time spent with patient and review of records: 30 minutes.     Anastasia Johnson MD

## 2021-09-03 NOTE — PROGRESS NOTES
[facility] [place]     Name: Zoë Randall MRN: [id number]   : 1965 Hospital: [address]   Date: 9/3/2021        Impression Plan   1. Acute respiratory failure  2. Hypoxia  3. COVID 19 PNA  4. HTN  5. CAD               · Wean O2 to keep sats above 90%; currently on 4L  · Continue remdesivir  · Continue dexamethasone  · S/p Actemra on   · Repeat CXR and check proBNP. Diurese if evidence of pulm edema. · Trend D-dimer and CRP  · Self proning 3 hrs at night. · OOB into chair as much as possible           Radiology  (personally reviewed) CXR  reviewed: bilateral infiltrates       Subjective     Cc: shortness of breath    63 yo with PMHx of CAD presenting with increasing SOB in the last 2 days. He was diagnosed with COVID 19 on . 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 Currently on 2 liters. Feels as if he can take deeper breaths this AM after starting Dexamethasone. Interval history  Afebrile  BP stable/improved  Sats 94% on 4L  No new am labs    Review of Systems: Still fairly very winded with activity. Desats to the low 80s off O2 when he walked to the bathroom. Has still some neck/throat discomfort with deep inspiration. Denies fever or chills. Denies abd pain or LE pain/swelling. A comprehensive review of systems was negative except for that written in the HPI.     Past Medical History:   Diagnosis Date    Arthritis     ankles, feet, hands    CAD (coronary artery disease)     s/p cath on 14 with 80% prox/mid LAD lesion;   --> PCI to RCA (2 ANNA)    Cancer ([facility] [address]     tonsil cancer    Dyslipidemia     GERD (gastroesophageal reflux disease)     Hypertension     Hypothyroidism     Psychiatric disorder     depression    Seizures ([facility] [address]       Past Surgical History:   Procedure Laterality Date    HX HEENT      tonsillectomy for malignant tumor    HX OTHER SURGICAL      tonsil cancer (removed)    [place] CARDIAC SURG PROCEDURE UNLIST      stent      Prior to Admission medications    Medication Sig Start Date End Date Taking? Authorizing Provider   levothyroxine (SYNTHROID) 200 mcg tablet Take 200 mcg by mouth Daily (before breakfast). Yes Provider, Historical   ondansetron (Zofran ODT) 4 mg disintegrating tablet Take 4 mg by mouth every eight (8) hours as needed for Nausea or Vomiting. Yes Provider, Historical   evolocumab (REPATHA SURECLICK) pen injection 1 mL by SubCUTAneous route every fourteen (14) days. 21  Yes Roger Hopkins MD   losartan (COZAAR) 50 mg tablet Take 1 Tab by mouth daily. 21  Yes Sergio STEWART NP   aspirin delayed-release 81 mg tablet Take 1 Tab by mouth daily. 21  Yes Mandi Sinha NP   nitroglycerin (NITROSTAT) 0.4 mg SL tablet 1 Tab by SubLINGual route every five (5) minutes as needed for Chest Pain. Up to 3 tabs. If pain after 1 tab, seek help. Indications: chest pain 21  Yes Mandi Sinha NP     Current Facility-Administered Medications   Medication Dose Route Frequency    amLODIPine (NORVASC) tablet 5 mg  5 mg Oral DAILY    enoxaparin (LOVENOX) injection 40 mg  40 mg SubCUTAneous Q12H    sodium chloride (NS) flush 5-40 mL  5-40 mL IntraVENous Q8H    dexAMETHasone (DECADRON) tablet 6 mg  6 mg Oral DAILY    remdesivir 100 mg in 0.9% sodium chloride 250 mL IVPB  100 mg IntraVENous Q24H    aspirin delayed-release tablet 81 mg  81 mg Oral DAILY    losartan (COZAAR) tablet 100 mg  100 mg Oral DAILY    levothyroxine (SYNTHROID) tablet 200 mcg  200 mcg Oral ACB     Allergies   Allergen Reactions    Rosuvastatin Myalgia      Social History     Tobacco Use    Smoking status: Former Smoker     Packs/day: 2.00     Years: 38.00     Pack years: 76.00     Quit date:      Years since quittin.6    Smokeless tobacco: Former User     Quit date: 1980    Tobacco comment: Started at age 12 or 16   Substance Use Topics    Alcohol use:  Yes Alcohol/week: 2.0 - 3.0 standard drinks     Types: 2 - 3 Cans of beer per week     Comment: twice a week when eating out       Family History   Problem Relation Age of Onset    Thyroid Disease Mother     Heart Attack Father     Heart Disease Father         cabg  x4 vessel    Hypertension Father     Anesth Problems Neg Hx           Laboratory: I have personally reviewed the flowsheet and labs.      Recent Labs     09/02/21 0528 09/01/21 0133   WBC 7.7 8.7   HGB 11.8* 11.8*   HCT 35.6* 35.6*    203     Recent Labs     09/02/21 0528 09/01/21 0133   * 136   K 3.7 3.8    100   CO2 26 30   * 116*   BUN 23* 17   CREA 0.93 1.08   CA 8.5 8.9   MG  --  2.0   ALB 3.0* 3.4*   ALT 32 35   INR  --  1.1       Objective:     Visit Vitals  /73 (BP 1 Location: Left upper arm, BP Patient Position: Sitting)   Pulse 72   Temp 97.9 °F (36.6 °C)   Resp 16   Ht 5' 10\" (1.778 m)   SpO2 94%   BMI 37.64 kg/m²         Intake/Output Summary (Last 24 hours) at 9/3/2021 1233  Last data filed at 9/2/2021 2039  Gross per 24 hour   Intake 250 ml   Output    Net 250 ml     EXAM:   GENERAL: awake, alert, on NC, HEENT:  anicteric, EOMI, no alar flaring or epistaxis, oral mucosa moist without cyanosis, NECK:  no jugular vein distention, no retractions, no thyromegaly or masses, LUNGS: CTA, no w/r/r HEART:  Regular rate and rhythm with no MGR; no edema is present, ABDOMEN:  soft with no tenderness, bowel sounds present, EXTREMITIES:  warm with no cyanosis, SKIN:  no jaundice or ecchymosis and NEUROLOGIC:  alert and oriented, grossly non-focal    JUAN CARLOS Marrero  [facility]

## 2021-09-03 NOTE — PROGRESS NOTES
Problem: Risk for Spread of Infection  Goal: Prevent transmission of infectious organism to others  Description: Prevent the transmission of infectious organisms to other patients, staff members, and visitors. Outcome: Progressing Towards Goal     Problem: Patient Education:  Go to Education Activity  Goal: Patient/Family Education  Outcome: Progressing Towards Goal     Problem: Falls - Risk of  Goal: *Absence of Falls  Description: Document Leatha Locket Fall Risk and appropriate interventions in the flowsheet.   Outcome: Progressing Towards Goal  Note: Fall Risk Interventions:            Medication Interventions: Bed/chair exit alarm, Evaluate medications/consider consulting pharmacy, Teach patient to arise slowly, Patient to call before getting OOB                   Problem: Patient Education: Go to Patient Education Activity  Goal: Patient/Family Education  Outcome: Progressing Towards Goal

## 2021-09-03 NOTE — PROGRESS NOTES
9/3/2021   CARE MANAGEMENT NOTE:  CM reviewed EMR for clinical updates. Pt was admitted with COVID. Reportedly, pt resides with his wife Teretha Fothergill ([phone]).     RUR 16%     Transition Plan of Care:  1. Pulmonary following for medical management  2. Plan is for pt to return home with family  3. Home oxygen eval needed prior to discharge  4. Outpt f/u  5. Family will transport pt home     CM will continue to follow pt until discharged.   Ameena

## 2021-09-04 LAB
ALBUMIN SERPL-MCNC: 3.1 G/DL (ref 3.5–5)
ALBUMIN/GLOB SERPL: 0.8 {RATIO} (ref 1.1–2.2)
ALP SERPL-CCNC: 48 U/L (ref 45–117)
ALT SERPL-CCNC: 49 U/L (ref 12–78)
ANION GAP SERPL CALC-SCNC: 8 MMOL/L (ref 5–15)
AST SERPL-CCNC: 27 U/L (ref 15–37)
BASOPHILS # BLD: 0 K/UL (ref 0–0.1)
BASOPHILS NFR BLD: 0 % (ref 0–1)
BILIRUB SERPL-MCNC: 0.2 MG/DL (ref 0.2–1)
BUN SERPL-MCNC: 26 MG/DL (ref 6–20)
BUN/CREAT SERPL: 27 (ref 12–20)
CALCIUM SERPL-MCNC: 9 MG/DL (ref 8.5–10.1)
CHLORIDE SERPL-SCNC: 101 MMOL/L (ref 97–108)
CO2 SERPL-SCNC: 26 MMOL/L (ref 21–32)
COMMENT, HOLDF: NORMAL
CREAT SERPL-MCNC: 0.96 MG/DL (ref 0.7–1.3)
D DIMER PPP FEU-MCNC: 0.24 MG/L FEU (ref 0–0.65)
DIFFERENTIAL METHOD BLD: ABNORMAL
EOSINOPHIL # BLD: 0 K/UL (ref 0–0.4)
EOSINOPHIL NFR BLD: 0 % (ref 0–7)
ERYTHROCYTE [DISTWIDTH] IN BLOOD BY AUTOMATED COUNT: 12 % (ref 11.5–14.5)
GLOBULIN SER CALC-MCNC: 3.9 G/DL (ref 2–4)
GLUCOSE SERPL-MCNC: 173 MG/DL (ref 65–100)
HCT VFR BLD AUTO: 35.9 % (ref 36.6–50.3)
HGB BLD-MCNC: 12.3 G/DL (ref 12.1–17)
IMM GRANULOCYTES # BLD AUTO: 0.1 K/UL (ref 0–0.04)
IMM GRANULOCYTES NFR BLD AUTO: 2 % (ref 0–0.5)
LYMPHOCYTES # BLD: 1.2 K/UL (ref 0.8–3.5)
LYMPHOCYTES NFR BLD: 14 % (ref 12–49)
MCH RBC QN AUTO: 29.4 PG (ref 26–34)
MCHC RBC AUTO-ENTMCNC: 34.3 G/DL (ref 30–36.5)
MCV RBC AUTO: 85.7 FL (ref 80–99)
MONOCYTES # BLD: 0.8 K/UL (ref 0–1)
MONOCYTES NFR BLD: 10 % (ref 5–13)
NEUTS SEG # BLD: 6.6 K/UL (ref 1.8–8)
NEUTS SEG NFR BLD: 74 % (ref 32–75)
NRBC # BLD: 0 K/UL (ref 0–0.01)
NRBC BLD-RTO: 0 PER 100 WBC
PLATELET # BLD AUTO: 386 K/UL (ref 150–400)
PMV BLD AUTO: 9.9 FL (ref 8.9–12.9)
POTASSIUM SERPL-SCNC: 3.6 MMOL/L (ref 3.5–5.1)
PROCALCITONIN SERPL-MCNC: 0.87 NG/ML
PROT SERPL-MCNC: 7 G/DL (ref 6.4–8.2)
RBC # BLD AUTO: 4.19 M/UL (ref 4.1–5.7)
SAMPLES BEING HELD,HOLD: NORMAL
SODIUM SERPL-SCNC: 135 MMOL/L (ref 136–145)
WBC # BLD AUTO: 8.8 K/UL (ref 4.1–11.1)

## 2021-09-04 PROCEDURE — 74011250637 HC RX REV CODE- 250/637: Performed by: INTERNAL MEDICINE

## 2021-09-04 PROCEDURE — 74011250636 HC RX REV CODE- 250/636: Performed by: INTERNAL MEDICINE

## 2021-09-04 PROCEDURE — 85379 FIBRIN DEGRADATION QUANT: CPT

## 2021-09-04 PROCEDURE — 74011000258 HC RX REV CODE- 258: Performed by: INTERNAL MEDICINE

## 2021-09-04 PROCEDURE — 74011250636 HC RX REV CODE- 250/636: Performed by: FAMILY MEDICINE

## 2021-09-04 PROCEDURE — 84145 PROCALCITONIN (PCT): CPT

## 2021-09-04 PROCEDURE — 85025 COMPLETE CBC W/AUTO DIFF WBC: CPT

## 2021-09-04 PROCEDURE — 74011250637 HC RX REV CODE- 250/637: Performed by: NURSE PRACTITIONER

## 2021-09-04 PROCEDURE — 36415 COLL VENOUS BLD VENIPUNCTURE: CPT

## 2021-09-04 PROCEDURE — 74011250637 HC RX REV CODE- 250/637: Performed by: FAMILY MEDICINE

## 2021-09-04 PROCEDURE — 65270000029 HC RM PRIVATE

## 2021-09-04 PROCEDURE — 77010033678 HC OXYGEN DAILY

## 2021-09-04 PROCEDURE — 80053 COMPREHEN METABOLIC PANEL: CPT

## 2021-09-04 PROCEDURE — 74011000250 HC RX REV CODE- 250: Performed by: INTERNAL MEDICINE

## 2021-09-04 RX ORDER — HYDROCHLOROTHIAZIDE 25 MG/1
25 TABLET ORAL DAILY
Status: DISCONTINUED | OUTPATIENT
Start: 2021-09-04 | End: 2021-09-05 | Stop reason: HOSPADM

## 2021-09-04 RX ADMIN — Medication 10 ML: at 21:40

## 2021-09-04 RX ADMIN — REMDESIVIR 100 MG: 100 INJECTION, POWDER, LYOPHILIZED, FOR SOLUTION INTRAVENOUS at 06:38

## 2021-09-04 RX ADMIN — DEXAMETHASONE 6 MG: 6 TABLET ORAL at 09:17

## 2021-09-04 RX ADMIN — HYDROCHLOROTHIAZIDE 25 MG: 25 TABLET ORAL at 09:18

## 2021-09-04 RX ADMIN — ACETAMINOPHEN 650 MG: 325 TABLET ORAL at 03:40

## 2021-09-04 RX ADMIN — ENOXAPARIN SODIUM 40 MG: 40 INJECTION SUBCUTANEOUS at 06:38

## 2021-09-04 RX ADMIN — Medication 10 ML: at 16:06

## 2021-09-04 RX ADMIN — AMLODIPINE BESYLATE 5 MG: 5 TABLET ORAL at 09:19

## 2021-09-04 RX ADMIN — Medication 10 ML: at 06:38

## 2021-09-04 RX ADMIN — ENOXAPARIN SODIUM 40 MG: 40 INJECTION SUBCUTANEOUS at 17:47

## 2021-09-04 RX ADMIN — LOSARTAN POTASSIUM 100 MG: 50 TABLET, FILM COATED ORAL at 09:18

## 2021-09-04 RX ADMIN — ASPIRIN 81 MG: 81 TABLET, COATED ORAL at 09:18

## 2021-09-04 RX ADMIN — LEVOTHYROXINE SODIUM 200 MCG: 0.1 TABLET ORAL at 06:38

## 2021-09-04 NOTE — PROGRESS NOTES
Bedside shift change report given to Anisha Joshua (oncoming nurse) by Anish Diego (offgoing nurse). Report included the following information SBAR, Kardex, Intake/Output and Recent Results.

## 2021-09-04 NOTE — PROGRESS NOTES
Bedside and Verbal shift change report given to Sameer Mason RN (oncoming nurse) by Celeste Franco RN (offgoing nurse). Report included the following information SBAR, Kardex, Procedure Summary, Intake/Output, MAR, Accordion, Recent Results and Med Rec Status.

## 2021-09-04 NOTE — PROGRESS NOTES
Daily Progress Note: 9/4/2021  Alon Aden NP    Assessment/Plan:   Pneumonia due to COVID-19 virus (9/1/2021) /  Hypoxia (9/1/2021) POA: Has severe breakthrough disease. He is fully vaccinated. Diagnosed 8/24. Now with worsening dyspnea. -IV Dexamethasone, Day #4  -IV Remdesivir, Day #4  -Supplemental oxygen. -CRP 33 on admission. Improving  -Pulm following. Status-post Actemra 9/2  -repeat CXR 9/3 with multilobar PNA     CAD (coronary artery disease) (12/5/2018) / Dyslipidemia POA: he has stents. -Troponin neg.   -Resume Asprin, NTG PRN     HTN (hypertension), benign POA: BP elevated.    -BP likely up d/t steroids  -on Losartan (but at an increased dose) added amlodipine 9/3; hydralazine PRN  -BP up, added HCTZ 9/4     Hypothyroidism POA:   -TSH 12.7 on 9/1 but acutely ill  -on Levothyroxine          Problem List:  Problem List as of 9/4/2021 Date Reviewed: 4/22/2021        Codes Class Noted - Resolved    CAD (coronary artery disease) ICD-10-CM: I25.10  ICD-9-CM: 414.00  12/5/2018 - Present        * (Principal) Pneumonia due to COVID-19 virus ICD-10-CM: U07.1, J12.82  ICD-9-CM: 480.8, 079.89  9/1/2021 - Present        Hypoxia ICD-10-CM: R09.02  ICD-9-CM: 799.02  9/1/2021 - Present        HTN (hypertension), benign ICD-10-CM: I10  ICD-9-CM: 401.1  Unknown - Present        Hypothyroidism ICD-10-CM: E03.9  ICD-9-CM: 244.9  Unknown - Present        Dyslipidemia ICD-10-CM: E78.5  ICD-9-CM: 272.4  Unknown - Present        Post PTCA ICD-10-CM: Z98.61  ICD-9-CM: V45.82  12/5/2018 - Present        Malignant neoplasm of tonsil ([facility] [address] ICD-10-CM: C09.9  ICD-9-CM: 146.0  4/10/2017 - Present        Unstable angina ([facility] [address] ICD-10-CM: I20.0  ICD-9-CM: 411.1  9/29/2014 - Present        Family history of early CAD ICD-10-CM: Z82.49  ICD-9-CM: V17.3  9/29/2014 - Present        History of tobacco use ICD-10-CM: A92.735  ICD-9-CM: V15.82  9/29/2014 - Present        RESOLVED: HTN (hypertension) ICD-10-CM: I10  ICD-9-CM: 401.9  2014 - 8/3/2021              HPI:   Mr. Carmen Waters is a 64 y.o. male who is being admitted for worsening pneumonia due to COVID-19 virus. 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 He is fully vaccinated but exposed to [employer] patients. He had been feeling unwell with expected symptoms after being diagnosed with COVID-19 on the . However, in the past few days, he noted worsening SOB, now with minimal exertion and still associated with a non productive cough and fever. He had been taking some symptomatic medications with tylenol with some relief. He presented to our Emergency Department today where he was noted to be hypoxic. A CXR done showed a new, mild bilateral mid lung and bibasilar interstitial and patchy airspace disease. He will be admitted for further management. (Dr Giovanni Barfield)     : Feeling better this am. On 2L NC. More cough and SOB with activity. Pulm following.      : On 4L NC. He is c/o pain in his neck/throat with expiration. No pain with swallowing. Cough present and prod at times. Afebrile. 9/3: Given Actemra yesterday given his increasing oxygen need. Remains at 4L. CRP is improving. BP elevated. Losartan has been increased so will add Amlodipine. :remains on 4 LPM, will likely need home O2, proBNP not highly elevated, BP up due to steroids, feels better overall, some SOB but mainly with exertion, no real cough, smell/taste are recovering, sats 96% at rest while I was examining him    Review of Systems:   A comprehensive review of systems was negative except for that written in the HPI.     Objective:   Physical Exam:     Visit Vitals  BP (!) 159/78 (BP 1 Location: Left upper arm)   Pulse 70   Temp 98.5 °F (36.9 °C)   Resp 16   Ht 5' 10\" (1.778 m)   SpO2 91% Comment: room air   BMI 37.64 kg/m²    O2 Flow Rate (L/min): 2 l/min O2 Device: Nasal cannula    Temp (24hrs), Av °F (36.7 °C), Min:97.3 °F (36.3 °C), Max:98.5 °F (36.9 °C)    No intake/output data recorded. 09/02 1901 - 09/04 0700  In: 250 [P.O.:250]  Out: 0     General:  Alert, cooperative, no distress, appears stated age. Head:  Normocephalic, without obvious abnormality, atraumatic. Eyes:  Conjunctivae/corneas clear. Nose: Nares normal. Septum midline. Mucosa normal. No drainage or sinus tenderness. Throat: Lips, mucosa, and tongue normal.    Neck: Supple, symmetrical, trachea midline, no adenopathy, thyroid: no enlargement/tenderness/nodules, no carotid bruit and no JVD. Back:   Symmetric, no curvature. ROM normal. No CVA tenderness. Lungs:   Diminished breath sounds   Chest wall:  No tenderness or deformity. Heart:  Regular rate and rhythm, S1, S2 normal, no murmur, click, rub or gallop. Abdomen:   Soft, non-tender. Bowel sounds normal. No masses,  No organomegaly. Extremities: Extremities normal, atraumatic, no cyanosis or edema. No calf tenderness or cords. Pulses: 2+ and symmetric all extremities. Skin: Skin color, texture, turgor normal. No rashes or lesions   Neurologic: CNII-XII intact. Alert and oriented X 3. Fine motor of hands and fingers normal.   equal.  No cogwheeling or rigidity. Gait not tested at this time. Sensation grossly normal to touch. Gross motor of extremities normal.       Data Review:    CXR 8/31/21   IMPRESSION  New, mild bilateral mid lung and bibasilar interstitial and patchy  airspace disease.   Recent Days:  Recent Labs     09/04/21  0327 09/02/21  0528   WBC 8.8 7.7   HGB 12.3 11.8*   HCT 35.9* 35.6*    300     Recent Labs     09/04/21  0327 09/02/21  0528   * 134*   K 3.6 3.7    100   CO2 26 26   * 155*   BUN 26* 23*   CREA 0.96 0.93   CA 9.0 8.5   ALB 3.1* 3.0*   TBILI 0.2 0.3   ALT 49 32       24 Hour Results:  Recent Results (from the past 24 hour(s))   PROCALCITONIN    Collection Time: 09/04/21  3:13 AM   Result Value Ref Range    Procalcitonin 0.87 ng/mL   CBC WITH AUTOMATED DIFF    Collection Time: 09/04/21  3:27 AM   Result Value Ref Range    WBC 8.8 4.1 - 11.1 K/uL    RBC 4.19 4. 10 - 5.70 M/uL    HGB 12.3 12.1 - 17.0 g/dL    HCT 35.9 (L) 36.6 - 50.3 %    MCV 85.7 80.0 - 99.0 FL    MCH 29.4 26.0 - 34.0 PG    MCHC 34.3 30.0 - 36.5 g/dL    RDW 12.0 11.5 - 14.5 %    PLATELET 277 895 - 245 K/uL    MPV 9.9 8.9 - 12.9 FL    NRBC 0.0 0  WBC    ABSOLUTE NRBC 0.00 0.00 - 0.01 K/uL    NEUTROPHILS 74 32 - 75 %    LYMPHOCYTES 14 12 - 49 %    MONOCYTES 10 5 - 13 %    EOSINOPHILS 0 0 - 7 %    BASOPHILS 0 0 - 1 %    IMMATURE GRANULOCYTES 2 (H) 0.0 - 0.5 %    ABS. NEUTROPHILS 6.6 1.8 - 8.0 K/UL    ABS. LYMPHOCYTES 1.2 0.8 - 3.5 K/UL    ABS. MONOCYTES 0.8 0.0 - 1.0 K/UL    ABS. EOSINOPHILS 0.0 0.0 - 0.4 K/UL    ABS. BASOPHILS 0.0 0.0 - 0.1 K/UL    ABS. IMM. GRANS. 0.1 (H) 0.00 - 0.04 K/UL    DF AUTOMATED     METABOLIC PANEL, COMPREHENSIVE    Collection Time: 09/04/21  3:27 AM   Result Value Ref Range    Sodium 135 (L) 136 - 145 mmol/L    Potassium 3.6 3.5 - 5.1 mmol/L    Chloride 101 97 - 108 mmol/L    CO2 26 21 - 32 mmol/L    Anion gap 8 5 - 15 mmol/L    Glucose 173 (H) 65 - 100 mg/dL    BUN 26 (H) 6 - 20 MG/DL    Creatinine 0.96 0.70 - 1.30 MG/DL    BUN/Creatinine ratio 27 (H) 12 - 20      GFR est AA >60 >60 ml/min/1.73m2    GFR est non-AA >60 >60 ml/min/1.73m2    Calcium 9.0 8.5 - 10.1 MG/DL    Bilirubin, total 0.2 0.2 - 1.0 MG/DL    ALT (SGPT) 49 12 - 78 U/L    AST (SGOT) 27 15 - 37 U/L    Alk.  phosphatase 48 45 - 117 U/L    Protein, total 7.0 6.4 - 8.2 g/dL    Albumin 3.1 (L) 3.5 - 5.0 g/dL    Globulin 3.9 2.0 - 4.0 g/dL    A-G Ratio 0.8 (L) 1.1 - 2.2     D DIMER    Collection Time: 09/04/21  6:00 AM   Result Value Ref Range    D-dimer 0.24 0.00 - 0.65 mg/L FEU   SAMPLES BEING HELD    Collection Time: 09/04/21  6:00 AM   Result Value Ref Range    SAMPLES BEING HELD 1RED     COMMENT        Add-on orders for these samples will be processed based on acceptable specimen integrity and analyte stability, which may vary by analyte. Medications reviewed  Current Facility-Administered Medications   Medication Dose Route Frequency    amLODIPine (NORVASC) tablet 5 mg  5 mg Oral DAILY    enoxaparin (LOVENOX) injection 40 mg  40 mg SubCUTAneous Q12H    sodium chloride (NS) flush 5-40 mL  5-40 mL IntraVENous Q8H    sodium chloride (NS) flush 5-40 mL  5-40 mL IntraVENous PRN    polyethylene glycol (MIRALAX) packet 17 g  17 g Oral DAILY PRN    promethazine (PHENERGAN) tablet 12.5 mg  12.5 mg Oral Q6H PRN    Or    ondansetron (ZOFRAN) injection 4 mg  4 mg IntraVENous Q6H PRN    dexAMETHasone (DECADRON) tablet 6 mg  6 mg Oral DAILY    remdesivir 100 mg in 0.9% sodium chloride 250 mL IVPB  100 mg IntraVENous Q24H    hydrALAZINE (APRESOLINE) 20 mg/mL injection 10 mg  10 mg IntraVENous Q6H PRN    aspirin delayed-release tablet 81 mg  81 mg Oral DAILY    losartan (COZAAR) tablet 100 mg  100 mg Oral DAILY    levothyroxine (SYNTHROID) tablet 200 mcg  200 mcg Oral ACB    acetaminophen (TYLENOL) tablet 650 mg  650 mg Oral Q6H PRN    Or    acetaminophen (TYLENOL) suppository 650 mg  650 mg Rectal Q6H PRN    guaiFENesin-dextromethorphan (ROBITUSSIN DM) 100-10 mg/5 mL syrup 5 mL  5 mL Oral Q4H PRN       Care Plan discussed with: Ptient    Total time spent with patient and review of records: 30 minutes.     Charlene Arizmendi MD

## 2021-09-05 VITALS
BODY MASS INDEX: 37.64 KG/M2 | DIASTOLIC BLOOD PRESSURE: 90 MMHG | HEART RATE: 77 BPM | HEIGHT: 70 IN | SYSTOLIC BLOOD PRESSURE: 157 MMHG | RESPIRATION RATE: 17 BRPM | OXYGEN SATURATION: 90 % | TEMPERATURE: 98.1 F

## 2021-09-05 LAB
ALBUMIN SERPL-MCNC: 3.4 G/DL (ref 3.5–5)
ALBUMIN/GLOB SERPL: 0.9 {RATIO} (ref 1.1–2.2)
ALP SERPL-CCNC: 53 U/L (ref 45–117)
ALT SERPL-CCNC: 66 U/L (ref 12–78)
ANION GAP SERPL CALC-SCNC: 6 MMOL/L (ref 5–15)
AST SERPL-CCNC: 24 U/L (ref 15–37)
BASOPHILS # BLD: 0 K/UL (ref 0–0.1)
BASOPHILS NFR BLD: 0 % (ref 0–1)
BILIRUB SERPL-MCNC: 0.2 MG/DL (ref 0.2–1)
BNP SERPL-MCNC: 152 PG/ML
BUN SERPL-MCNC: 27 MG/DL (ref 6–20)
BUN/CREAT SERPL: 24 (ref 12–20)
CALCIUM SERPL-MCNC: 9.1 MG/DL (ref 8.5–10.1)
CHLORIDE SERPL-SCNC: 99 MMOL/L (ref 97–108)
CO2 SERPL-SCNC: 29 MMOL/L (ref 21–32)
CREAT SERPL-MCNC: 1.12 MG/DL (ref 0.7–1.3)
CRP SERPL-MCNC: 2.06 MG/DL (ref 0–0.6)
DIFFERENTIAL METHOD BLD: ABNORMAL
EOSINOPHIL # BLD: 0 K/UL (ref 0–0.4)
EOSINOPHIL NFR BLD: 0 % (ref 0–7)
ERYTHROCYTE [DISTWIDTH] IN BLOOD BY AUTOMATED COUNT: 11.9 % (ref 11.5–14.5)
GLOBULIN SER CALC-MCNC: 3.7 G/DL (ref 2–4)
GLUCOSE SERPL-MCNC: 140 MG/DL (ref 65–100)
HCT VFR BLD AUTO: 39 % (ref 36.6–50.3)
HGB BLD-MCNC: 13.2 G/DL (ref 12.1–17)
IMM GRANULOCYTES # BLD AUTO: 0 K/UL
IMM GRANULOCYTES NFR BLD AUTO: 0 %
LYMPHOCYTES # BLD: 2.2 K/UL (ref 0.8–3.5)
LYMPHOCYTES NFR BLD: 21 % (ref 12–49)
MCH RBC QN AUTO: 29 PG (ref 26–34)
MCHC RBC AUTO-ENTMCNC: 33.8 G/DL (ref 30–36.5)
MCV RBC AUTO: 85.7 FL (ref 80–99)
METAMYELOCYTES NFR BLD MANUAL: 1 %
MONOCYTES # BLD: 0.7 K/UL (ref 0–1)
MONOCYTES NFR BLD: 7 % (ref 5–13)
NEUTS BAND NFR BLD MANUAL: 2 % (ref 0–6)
NEUTS SEG # BLD: 7.3 K/UL (ref 1.8–8)
NEUTS SEG NFR BLD: 69 % (ref 32–75)
NRBC # BLD: 0 K/UL (ref 0–0.01)
NRBC BLD-RTO: 0 PER 100 WBC
PLATELET # BLD AUTO: 440 K/UL (ref 150–400)
PMV BLD AUTO: 9.6 FL (ref 8.9–12.9)
POTASSIUM SERPL-SCNC: 4.2 MMOL/L (ref 3.5–5.1)
PROT SERPL-MCNC: 7.1 G/DL (ref 6.4–8.2)
RBC # BLD AUTO: 4.55 M/UL (ref 4.1–5.7)
RBC MORPH BLD: ABNORMAL
SODIUM SERPL-SCNC: 134 MMOL/L (ref 136–145)
WBC # BLD AUTO: 10.3 K/UL (ref 4.1–11.1)

## 2021-09-05 PROCEDURE — 74011000250 HC RX REV CODE- 250: Performed by: INTERNAL MEDICINE

## 2021-09-05 PROCEDURE — 80053 COMPREHEN METABOLIC PANEL: CPT

## 2021-09-05 PROCEDURE — 74011250637 HC RX REV CODE- 250/637: Performed by: INTERNAL MEDICINE

## 2021-09-05 PROCEDURE — 74011250636 HC RX REV CODE- 250/636: Performed by: INTERNAL MEDICINE

## 2021-09-05 PROCEDURE — 94760 N-INVAS EAR/PLS OXIMETRY 1: CPT

## 2021-09-05 PROCEDURE — 85025 COMPLETE CBC W/AUTO DIFF WBC: CPT

## 2021-09-05 PROCEDURE — 83880 ASSAY OF NATRIURETIC PEPTIDE: CPT

## 2021-09-05 PROCEDURE — 74011000258 HC RX REV CODE- 258: Performed by: INTERNAL MEDICINE

## 2021-09-05 PROCEDURE — 74011250637 HC RX REV CODE- 250/637: Performed by: FAMILY MEDICINE

## 2021-09-05 PROCEDURE — 36415 COLL VENOUS BLD VENIPUNCTURE: CPT

## 2021-09-05 PROCEDURE — 74011250636 HC RX REV CODE- 250/636: Performed by: FAMILY MEDICINE

## 2021-09-05 PROCEDURE — 86140 C-REACTIVE PROTEIN: CPT

## 2021-09-05 PROCEDURE — 77010033678 HC OXYGEN DAILY

## 2021-09-05 RX ORDER — AMLODIPINE BESYLATE 5 MG/1
5 TABLET ORAL DAILY
Qty: 30 TABLET | Refills: 0 | Status: SHIPPED | OUTPATIENT
Start: 2021-09-05

## 2021-09-05 RX ADMIN — REMDESIVIR 100 MG: 100 INJECTION, POWDER, LYOPHILIZED, FOR SOLUTION INTRAVENOUS at 06:04

## 2021-09-05 RX ADMIN — DEXAMETHASONE 6 MG: 6 TABLET ORAL at 11:49

## 2021-09-05 RX ADMIN — LEVOTHYROXINE SODIUM 200 MCG: 0.1 TABLET ORAL at 06:04

## 2021-09-05 RX ADMIN — Medication 10 ML: at 06:04

## 2021-09-05 RX ADMIN — ASPIRIN 81 MG: 81 TABLET, COATED ORAL at 11:49

## 2021-09-05 RX ADMIN — ENOXAPARIN SODIUM 40 MG: 40 INJECTION SUBCUTANEOUS at 06:04

## 2021-09-05 RX ADMIN — AMLODIPINE BESYLATE 5 MG: 5 TABLET ORAL at 11:48

## 2021-09-05 RX ADMIN — LOSARTAN POTASSIUM 100 MG: 50 TABLET, FILM COATED ORAL at 11:49

## 2021-09-05 RX ADMIN — HYDROCHLOROTHIAZIDE 25 MG: 25 TABLET ORAL at 11:49

## 2021-09-05 NOTE — PROGRESS NOTES
09/05/21 1035   Resting (Room Air)   SpO2 90   HR 77   During Walk (Room Air)   SpO2 89   HR 78   After Walk   SpO2 92   HR 81

## 2021-09-05 NOTE — DISCHARGE INSTRUCTIONS
Patient Discharge Instructions    Tiara [facility] / [id number] : 1965    Admitted 2021 Discharged: 2021 8:02 AM     ACUTE DIAGNOSES:  Pneumonia due to COVID-19 virus [U07.1, J12.82]    CHRONIC MEDICAL DIAGNOSES:  Problem List as of 2021 Date Reviewed: 2021        Codes Class Noted - Resolved    CAD (coronary artery disease) ICD-10-CM: I25.10  ICD-9-CM: 414.00  2018 - Present        * (Principal) Pneumonia due to COVID-19 virus ICD-10-CM: U07.1, J12.82  ICD-9-CM: 480.8, 079.89  2021 - Present        Hypoxia ICD-10-CM: R09.02  ICD-9-CM: 799.02  2021 - Present        HTN (hypertension), benign ICD-10-CM: I10  ICD-9-CM: 401.1  Unknown - Present        Hypothyroidism ICD-10-CM: E03.9  ICD-9-CM: 244.9  Unknown - Present        Dyslipidemia ICD-10-CM: E78.5  ICD-9-CM: 272.4  Unknown - Present        Post PTCA ICD-10-CM: Z98.61  ICD-9-CM: V45.82  2018 - Present        Malignant neoplasm of tonsil ([facility] [address] ICD-10-CM: C09.9  ICD-9-CM: 146.0  4/10/2017 - Present        [facility] ICD-10-CM: I20.0  ICD-9-CM: 411.1  2014 - Present        Family history of early CAD ICD-10-CM: Z82.49  ICD-9-CM: V17.3  2014 - Present        History of tobacco use ICD-10-CM: Z87.891  ICD-9-CM: V15.82  2014 - Present        RESOLVED: HTN (hypertension) ICD-10-CM: I10  ICD-9-CM: 401.9  2014 - 8/3/2021              DISCHARGE MEDICATIONS:       · It is important that you take the medication exactly as they are prescribed. · Keep your medication in the bottles provided by the pharmacist and keep a list of the medication names, dosages, and times to be taken in your wallet. · Do not take other medications without consulting your doctor. DIET:  Cardiac Diet  ACTIVITY: Activity as tolerated. Out of work until you follow up with your PCP.     ADDITIONAL INFORMATION: If you experience any of the following symptoms then please call your primary care physician or return to the emergency room if you cannot get hold of your doctor: Fever, chills, nausea, vomiting, diarrhea, change in mentation, falling, bleeding, shortness of breath. FOLLOW UP CARE:  Dr. Avis Lyles NP  you are to call and set up an appointment to see them with in 1 week. Follow-up with specialists at directed by them      Information obtained by :  I understand that if any problems occur once I am at home I am to contact my physician. I understand and acknowledge receipt of the instructions indicated above.                                                                                                                                            Physician's or R.N.'s Signature                                                                  Date/Time                                                                                                                                              Patient or Representative Signature                                                          Date/Time

## 2021-09-05 NOTE — PROGRESS NOTES
I have reviewed discharge instructions with the patient. The patient verbalized understanding. IV rempoved. Jamil copy of AVS placed in Pt chart.

## 2021-09-05 NOTE — PROGRESS NOTES
Daily Progress Note: 9/5/2021  Kd Luna NP    Assessment/Plan:   Pneumonia due to COVID-19 virus (9/1/2021) /  Hypoxia (9/1/2021) POA: Has severe breakthrough disease. He is fully vaccinated. Diagnosed 8/24. Now with worsening dyspnea. -IV Dexamethasone, Day #5  -IV Remdesivir, Day #5  -Supplemental oxygen. -CRP 33 on admission. Improving  -Pulm following. Status-post Actemra 9/2  -repeat CXR 9/3 with multilobar PNA  -home today pending home oxygen eval     CAD (coronary artery disease) (12/5/2018) / Dyslipidemia POA: he has stents. -Troponin neg.   -Resumed Asprin, NTG PRN     HTN (hypertension), benign POA: BP elevated.    -BP likely up d/t steroids, will be stopping   -on Losartan (but at an increased dose) added amlodipine 9/3; hydralazine PRN  -BP up, added HCTZ 9/4     Hypothyroidism POA:   -TSH 12.7 on 9/1 but acutely ill  -on Levothyroxine          Problem List:  Problem List as of 9/5/2021 Date Reviewed: 4/22/2021        Codes Class Noted - Resolved    CAD (coronary artery disease) ICD-10-CM: I25.10  ICD-9-CM: 414.00  12/5/2018 - Present        * (Principal) Pneumonia due to COVID-19 virus ICD-10-CM: U07.1, J12.82  ICD-9-CM: 480.8, 079.89  9/1/2021 - Present        Hypoxia ICD-10-CM: R09.02  ICD-9-CM: 799.02  9/1/2021 - Present        HTN (hypertension), benign ICD-10-CM: I10  ICD-9-CM: 401.1  Unknown - Present        Hypothyroidism ICD-10-CM: E03.9  ICD-9-CM: 244.9  Unknown - Present        Dyslipidemia ICD-10-CM: E78.5  ICD-9-CM: 272.4  Unknown - Present        Post PTCA ICD-10-CM: Z98.61  ICD-9-CM: V45.82  12/5/2018 - Present        Malignant neoplasm of tonsil ([facility] [address] ICD-10-CM: C09.9  ICD-9-CM: 146.0  4/10/2017 - Present        Unstable angina ([facility] [address] ICD-10-CM: I20.0  ICD-9-CM: 411.1  9/29/2014 - Present        Family history of early CAD ICD-10-CM: Z82.49  ICD-9-CM: V17.3  9/29/2014 - Present        History of tobacco use ICD-10-CM: M74.333  ICD-9-CM: V15.82  9/29/2014 - Present        RESOLVED: HTN (hypertension) ICD-10-CM: I10  ICD-9-CM: 401.9  9/29/2014 - 8/3/2021              HPI:   Mr. Ramy Carvajal is a 64 y.o. male who is being admitted for worsening pneumonia due to COVID-19 virus. 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 He had been feeling unwell with expected symptoms after being diagnosed with COVID-19 on the 8/26. However, in the past few days, he noted worsening SOB, now with minimal exertion and still associated with a non productive cough and fever. He had been taking some symptomatic medications with tylenol with some relief. He presented to our Emergency Department today where he was noted to be hypoxic. A CXR done showed a new, mild bilateral mid lung and bibasilar interstitial and patchy airspace disease. He will be admitted for further management. (Dr Jessi Lake)     9/1: Feeling better this am. On 2L NC. More cough and SOB with activity. Pulm following.      9/2: On 4L NC. He is c/o pain in his neck/throat with expiration. No pain with swallowing. Cough present and prod at times. Afebrile. 9/3: Given Actemra yesterday given his increasing oxygen need. Remains at 4L. CRP is improving. BP elevated. Losartan has been increased so will add Amlodipine. 9/4:remains on 4 LPM, will likely need home O2, proBNP not highly elevated, BP up due to steroids, feels better overall, some SOB but mainly with exertion, no real cough, smell/taste are recovering, sats 96% at rest while I was examining him    9/5: Finishes remdesivir today, doing better with oxygenation, at least at rest.  Wants to go home. Will have 6 minute walk done today. D-dimer stable, CRP continuing to improve. Ready for discharge pending oxygen eval.     Review of Systems:   A comprehensive review of systems was negative except for that written in the HPI.     Objective:   Physical Exam:     Visit Vitals  BP (!) 146/71 (BP 1 Location: Left upper arm, BP Patient Position: At rest)   Pulse 77   Temp 98.1 °F (36.7 °C)   Resp 20   Ht 5' 10\" (1.778 m)   SpO2 93%   BMI 37.64 kg/m²    O2 Flow Rate (L/min): 2 l/min O2 Device: Nasal cannula    Temp (24hrs), Av.4 °F (36.9 °C), Min:98.1 °F (36.7 °C), Max:98.6 °F (37 °C)    No intake/output data recorded. No intake/output data recorded. General:  Alert, cooperative, no distress, appears stated age. Head:  Normocephalic, without obvious abnormality, atraumatic. Eyes:  Conjunctivae/corneas clear. Nose: Nares normal. Septum midline. Mucosa normal. No drainage or sinus tenderness. Throat: Lips, mucosa, and tongue normal.    Neck: Supple, symmetrical, trachea midline, no adenopathy, thyroid: no enlargement/tenderness/nodules, no carotid bruit and no JVD. Back:   Symmetric, no curvature. ROM normal. No CVA tenderness. Lungs:   Diminished breath sounds   Chest wall:  No tenderness or deformity. Heart:  Regular rate and rhythm, S1, S2 normal, no murmur, click, rub or gallop. Abdomen:   Soft, non-tender. Bowel sounds normal. No masses,  No organomegaly. Extremities: Extremities normal, atraumatic, no cyanosis or edema. No calf tenderness or cords. Pulses: 2+ and symmetric all extremities. Skin: Skin color, texture, turgor normal. No rashes or lesions   Neurologic: CNII-XII intact. Alert and oriented X 3. Fine motor of hands and fingers normal.   equal.  No cogwheeling or rigidity. Gait not tested at this time. Sensation grossly normal to touch. Gross motor of extremities normal.       Data Review:    CXR 21   IMPRESSION  New, mild bilateral mid lung and bibasilar interstitial and patchy  airspace disease.   Recent Days:  Recent Labs     21  0252 21  0327   WBC 10.3 8.8   HGB 13.2 12.3   HCT 39.0 35.9*   * 386     Recent Labs     21  0252 21  0327   * 135*   K 4.2 3.6   CL 99 101   CO2 29 26   * 173*   BUN 27* 26*   CREA 1.12 0.96   CA 9.1 9.0 ALB 3.4* 3.1*   TBILI 0.2 0.2   ALT 66 49       24 Hour Results:  Recent Results (from the past 24 hour(s))   C REACTIVE PROTEIN, QT    Collection Time: 09/05/21  2:52 AM   Result Value Ref Range    C-Reactive protein 2.06 (H) 0.00 - 0.60 mg/dL   NT-PRO BNP    Collection Time: 09/05/21  2:52 AM   Result Value Ref Range    NT pro- (H) <125 PG/ML   CBC WITH AUTOMATED DIFF    Collection Time: 09/05/21  2:52 AM   Result Value Ref Range    WBC 10.3 4.1 - 11.1 K/uL    RBC 4.55 4.10 - 5.70 M/uL    HGB 13.2 12.1 - 17.0 g/dL    HCT 39.0 36.6 - 50.3 %    MCV 85.7 80.0 - 99.0 FL    MCH 29.0 26.0 - 34.0 PG    MCHC 33.8 30.0 - 36.5 g/dL    RDW 11.9 11.5 - 14.5 %    PLATELET 224 (H) 313 - 400 K/uL    MPV 9.6 8.9 - 12.9 FL    NRBC 0.0 0  WBC    ABSOLUTE NRBC 0.00 0.00 - 0.01 K/uL    NEUTROPHILS 69 32 - 75 %    BAND NEUTROPHILS 2 0 - 6 %    LYMPHOCYTES 21 12 - 49 %    MONOCYTES 7 5 - 13 %    EOSINOPHILS 0 0 - 7 %    BASOPHILS 0 0 - 1 %    METAMYELOCYTES 1 (H) 0 %    IMMATURE GRANULOCYTES 0 %    ABS. NEUTROPHILS 7.3 1.8 - 8.0 K/UL    ABS. LYMPHOCYTES 2.2 0.8 - 3.5 K/UL    ABS. MONOCYTES 0.7 0.0 - 1.0 K/UL    ABS. EOSINOPHILS 0.0 0.0 - 0.4 K/UL    ABS. BASOPHILS 0.0 0.0 - 0.1 K/UL    ABS. IMM. GRANS. 0.0 K/UL    DF MANUAL      RBC COMMENTS NORMOCYTIC, NORMOCHROMIC     METABOLIC PANEL, COMPREHENSIVE    Collection Time: 09/05/21  2:52 AM   Result Value Ref Range    Sodium 134 (L) 136 - 145 mmol/L    Potassium 4.2 3.5 - 5.1 mmol/L    Chloride 99 97 - 108 mmol/L    CO2 29 21 - 32 mmol/L    Anion gap 6 5 - 15 mmol/L    Glucose 140 (H) 65 - 100 mg/dL    BUN 27 (H) 6 - 20 MG/DL    Creatinine 1.12 0.70 - 1.30 MG/DL    BUN/Creatinine ratio 24 (H) 12 - 20      GFR est AA >60 >60 ml/min/1.73m2    GFR est non-AA >60 >60 ml/min/1.73m2    Calcium 9.1 8.5 - 10.1 MG/DL    Bilirubin, total 0.2 0.2 - 1.0 MG/DL    ALT (SGPT) 66 12 - 78 U/L    AST (SGOT) 24 15 - 37 U/L    Alk.  phosphatase 53 45 - 117 U/L    Protein, total 7.1 6.4 - 8.2 g/dL    Albumin 3.4 (L) 3.5 - 5.0 g/dL    Globulin 3.7 2.0 - 4.0 g/dL    A-G Ratio 0.9 (L) 1.1 - 2.2         Medications reviewed  Current Facility-Administered Medications   Medication Dose Route Frequency    hydroCHLOROthiazide (HYDRODIURIL) tablet 25 mg  25 mg Oral DAILY    amLODIPine (NORVASC) tablet 5 mg  5 mg Oral DAILY    enoxaparin (LOVENOX) injection 40 mg  40 mg SubCUTAneous Q12H    sodium chloride (NS) flush 5-40 mL  5-40 mL IntraVENous Q8H    sodium chloride (NS) flush 5-40 mL  5-40 mL IntraVENous PRN    polyethylene glycol (MIRALAX) packet 17 g  17 g Oral DAILY PRN    promethazine (PHENERGAN) tablet 12.5 mg  12.5 mg Oral Q6H PRN    Or    ondansetron (ZOFRAN) injection 4 mg  4 mg IntraVENous Q6H PRN    dexAMETHasone (DECADRON) tablet 6 mg  6 mg Oral DAILY    hydrALAZINE (APRESOLINE) 20 mg/mL injection 10 mg  10 mg IntraVENous Q6H PRN    aspirin delayed-release tablet 81 mg  81 mg Oral DAILY    losartan (COZAAR) tablet 100 mg  100 mg Oral DAILY    levothyroxine (SYNTHROID) tablet 200 mcg  200 mcg Oral ACB    acetaminophen (TYLENOL) tablet 650 mg  650 mg Oral Q6H PRN    Or    acetaminophen (TYLENOL) suppository 650 mg  650 mg Rectal Q6H PRN    guaiFENesin-dextromethorphan (ROBITUSSIN DM) 100-10 mg/5 mL syrup 5 mL  5 mL Oral Q4H PRN       Care Plan discussed with: Ptient    Total time spent with patient and review of records: 35 minutes.     Kalina Concepcion MD

## 2021-09-05 NOTE — PROGRESS NOTES
9/5/2021 11:12 AM RT eval noted, no home supplemental O2 needs identified. CM will discontinue home O2 order. 9/5/2021  8:20 AM Discharge order and walking oximetry orders noted. CM called to RT notified of order and discharge today. CM will follow.  RAMOS MorelandW

## 2021-09-05 NOTE — PROGRESS NOTES
Bedside shift change report given to Yen May (oncoming nurse) by Ric Lopez (offgoing nurse). Report included the following information SBAR, Kardex, Intake/Output, MAR and Recent Results.

## 2021-09-05 NOTE — PROGRESS NOTES
Verbal shift change report given to Chauncey Pace RN (oncoming nurse) by Adalgisa Osullivan RN (offgoing nurse). Report included the following information SBAR, Kardex, Procedure Summary, Intake/Output, MAR, Accordion, Recent Results and Med Rec Status.

## 2021-09-05 NOTE — DISCHARGE SUMMARY
Physician Discharge Summary     Patient ID:    Cheyanne Barkley  [id number]  7 y.o.  1965  Darryn Yuen NP    Admit date: 9/1/2021  Discharge date and time: 9/5/2021  Admission Diagnoses: Pneumonia due to COVID-19 virus [U07.1, J12.82]  Discharge Medications:   Current Discharge Medication List        START taking these medications    Details   amLODIPine (NORVASC) 5 mg tablet Take 1 Tablet by mouth daily. Qty: 30 Tablet, Refills: 0           CONTINUE these medications which have NOT CHANGED    Details   levothyroxine (SYNTHROID) 200 mcg tablet Take 200 mcg by mouth Daily (before breakfast). ondansetron (Zofran ODT) 4 mg disintegrating tablet Take 4 mg by mouth every eight (8) hours as needed for Nausea or Vomiting.      evolocumab (REPATHA SURECLICK) pen injection 1 mL by SubCUTAneous route every fourteen (14) days. Qty: 7 Pen, Refills: 5      losartan (COZAAR) 50 mg tablet Take 1 Tab by mouth daily. Qty: 30 Tab, Refills: 0      aspirin delayed-release 81 mg tablet Take 1 Tab by mouth daily. Qty: 30 Tab, Refills: 11      nitroglycerin (NITROSTAT) 0.4 mg SL tablet 1 Tab by SubLINGual route every five (5) minutes as needed for Chest Pain. Up to 3 tabs. If pain after 1 tab, seek help. Indications: chest pain  Qty: 25 Tab, Refills: 0              Follow up Care:    1. Darryn Yuen NP with in 1 weeks  2. specialists as directed. Diet:  Cardiac Diet  Disposition:  Home.   Advanced Directive:  Discharge Exam:  [See today's progress note.]  CONSULTATIONS: Pulmonary/Intensive care    Significant Diagnostic Studies:   Recent Labs     09/05/21 0252 09/04/21 0327   WBC 10.3 8.8   HGB 13.2 12.3   HCT 39.0 35.9*   * 386     Recent Labs     09/05/21 0252 09/04/21  0327   * 135*   K 4.2 3.6   CL 99 101   CO2 29 26   BUN 27* 26*   CREA 1.12 0.96   * 173*   CA 9.1 9.0     Recent Labs     09/05/21 0252 09/04/21  0327   ALT 66 49   AP 53 48   TBILI 0.2 0.2   TP 7.1 7.0   ALB 3.4* 3.1* GLOB 3.7 3.9     No results for input(s): INR, PTP, APTT, INREXT in the last 72 hours. No results for input(s): FE, TIBC, PSAT, FERR in the last 72 hours. No results for input(s): PH, PCO2, PO2 in the last 72 hours. No results for input(s): CPK, CKMB in the last 72 hours. No lab exists for component: TROPONINI  No results found for: GLUCPOC  Lab Results   Component Value Date/Time    TSH 12.70 (H) 09/01/2021 01:33 AM         HOSPITAL COURSE & TREATMENT RENDERED:   Mr. Luis Healy is a 64 y.o. male who is being admitted for worsening pneumonia due to COVID-19 virus. 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 He is fully vaccinated but exposed to Lizette Halon patients. He had been feeling unwell with expected symptoms after being diagnosed with COVID-19 on the 8/26. However, in the past few days, he noted worsening SOB, now with minimal exertion and still associated with a non productive cough and fever. He had been taking some symptomatic medications with tylenol with some relief. He presented to our Emergency Department today where he was noted to be hypoxic. A CXR done showed a new, mild bilateral mid lung and bibasilar interstitial and patchy airspace disease. He will be admitted for further management. (Dr Carmelo Randhawa)     9/1: Feeling better this am. On 2L NC. More cough and SOB with activity. Pulm following.       9/2: On 4L NC. He is c/o pain in his neck/throat with expiration. No pain with swallowing. Cough present and prod at times. Afebrile. 9/3: Given Actemra yesterday given his increasing oxygen need. Remains at 4L. CRP is improving. BP elevated. Losartan has been increased so will add Amlodipine.       9/4:remains on 4 LPM, will likely need home O2, proBNP not highly elevated, BP up due to steroids, feels better overall, some SOB but mainly with exertion, no real cough, smell/taste are recovering, sats 96% at rest while I was examining him     9/5: Finishes remdesivir today, doing better with oxygenation, at least at rest.  Wants to go home. Will have 6 minute walk done today. D-dimer stable, CRP continuing to improve.   Ready for discharge pending oxygen eval.      He did not require home oxygen to be arranged    Signed:  Ivet Garvin MD  9/5/2021  8:01 AM

## 2021-09-07 ENCOUNTER — PATIENT OUTREACH (OUTPATIENT)
Dept: CASE MANAGEMENT | Age: 56
End: 2021-09-07

## 2021-09-07 NOTE — PROGRESS NOTES
Patient contacted regarding COVID-19 diagnosis. Discussed COVID-19 related testing which was available at this time. Test results were positive per chart discharge summary. Patient informed of results, if available? no.     Care Transition Nurse contacted the patient by telephone to perform post discharge assessment. Call within 2 business days of discharge: Yes Verified name and  with patient as identifiers. Provided introduction to self, and explanation of the CTN/ACM role, and reason for call due to risk factors for infection and/or exposure to COVID-19. Symptoms reviewed with patient who verbalized the following symptoms: no new symptoms and no worsening symptoms      Due to no new or worsening symptoms encounter was not routed to provider for escalation. Discussed follow-up appointments. If no appointment was previously scheduled, appointment scheduling offered:  yes. [facility] follow up appointment(s): No future appointments. Non-[facility] follow up appointment(s): Patient states he had AYESHA appointment with Elodia Tran NP/PCP on the morning of 21. Interventions to address risk factors: Obtained and reviewed discharge summary and/or continuity of care documents and Education of patient/family/caregiver/guardian to support self-management-Education provided regarding signs/symptoms of Covid-19, patient verbalized an understanding. Advance Care Planning:   Does patient have an Advance Directive: not on file; education provided. Educated patient about risk for severe COVID-19 due to risk factors according to CDC guidelines. CTN reviewed discharge instructions, medical action plan and red flag symptoms with the patient who verbalized understanding. Discussed COVID vaccination status: yes. Education provided on COVID-19 vaccination as appropriate. Discussed exposure protocols and quarantine with CDC Guidelines.  Patient was given an opportunity to verbalize any questions and concerns and agrees to contact CTN or health care provider for questions related to their healthcare. Reviewed and educated patient on any new and changed medications related to discharge diagnosis     Was patient discharged with a pulse oximeter? no     CTN provided contact information. Plan for follow-up call in 5-7 days based on severity of symptoms and risk factors.

## 2021-09-23 ENCOUNTER — DOCUMENTATION ONLY (OUTPATIENT)
Dept: CARDIOLOGY CLINIC | Age: 56
End: 2021-09-23

## 2021-09-23 DIAGNOSIS — E78.5 DYSLIPIDEMIA: Primary | ICD-10-CM

## 2021-09-23 DIAGNOSIS — Z78.9 STATIN INTOLERANCE: ICD-10-CM

## 2021-09-23 NOTE — PROGRESS NOTES
PA submitted on [email or web address]    Key: [id number]  Med: Repatha    No recent lipids in cc or media. To get approved, will need updated lipid panel. Will send NMR lipoprofile to pt to have drawn.

## 2021-12-20 ENCOUNTER — TELEPHONE (OUTPATIENT)
Dept: CARDIOLOGY CLINIC | Age: 56
End: 2021-12-20

## 2021-12-20 NOTE — TELEPHONE ENCOUNTER
Lew Luther with [employer] calling to follow up on  whether Dr. Carlie Orantes wants the patient to continue taking evolocumab (Tyronza Aljosey) pen injection. Inquiring whether the Determination appeals form, for evolocumab (REPATHA SURECLICK) pen injection    Log into: [employer]  Patient key#  [id number]      AVKXO:[phone]

## 2022-03-19 PROBLEM — R09.02 HYPOXIA: Status: ACTIVE | Noted: 2021-09-01

## 2022-03-19 PROBLEM — Z98.61 POST PTCA: Status: ACTIVE | Noted: 2018-12-05

## 2022-03-19 PROBLEM — I25.10 CAD (CORONARY ARTERY DISEASE): Status: ACTIVE | Noted: 2018-12-05

## 2022-03-19 PROBLEM — J12.82 PNEUMONIA DUE TO COVID-19 VIRUS: Status: ACTIVE | Noted: 2021-09-01

## 2022-03-19 PROBLEM — U07.1 PNEUMONIA DUE TO COVID-19 VIRUS: Status: ACTIVE | Noted: 2021-09-01

## 2022-03-20 PROBLEM — C09.9 MALIGNANT NEOPLASM OF TONSIL (HCC): Status: ACTIVE | Noted: 2017-04-10

## 2022-05-09 ENCOUNTER — TRANSCRIBE ORDER (OUTPATIENT)
Dept: SCHEDULING | Age: 57
End: 2022-05-09

## 2022-05-09 DIAGNOSIS — K11.22 ACUTE RECURRENT SIALOADENITIS: Primary | ICD-10-CM

## 2023-04-23 DIAGNOSIS — K11.22 ACUTE RECURRENT SIALOADENITIS: Primary | ICD-10-CM

## 2023-05-24 ENCOUNTER — CLINICAL DOCUMENTATION (OUTPATIENT)
Age: 58
End: 2023-05-24

## 2023-05-24 ENCOUNTER — OFFICE VISIT (OUTPATIENT)
Age: 58
End: 2023-05-24
Payer: COMMERCIAL

## 2023-05-24 VITALS
DIASTOLIC BLOOD PRESSURE: 88 MMHG | SYSTOLIC BLOOD PRESSURE: 170 MMHG | OXYGEN SATURATION: 96 % | WEIGHT: 267 LBS | HEART RATE: 68 BPM | BODY MASS INDEX: 38.22 KG/M2 | HEIGHT: 70 IN

## 2023-05-24 DIAGNOSIS — I25.118 CORONARY ARTERY DISEASE INVOLVING NATIVE CORONARY ARTERY OF NATIVE HEART WITH REFRACTORY ANGINA PECTORIS (HCC): ICD-10-CM

## 2023-05-24 DIAGNOSIS — R07.9 CHEST PAIN, UNSPECIFIED TYPE: ICD-10-CM

## 2023-05-24 DIAGNOSIS — E78.5 DYSLIPIDEMIA: ICD-10-CM

## 2023-05-24 DIAGNOSIS — I10 HTN (HYPERTENSION), BENIGN: Primary | ICD-10-CM

## 2023-05-24 DIAGNOSIS — R73.09 ELEVATED GLUCOSE: ICD-10-CM

## 2023-05-24 DIAGNOSIS — R06.02 SOB (SHORTNESS OF BREATH): ICD-10-CM

## 2023-05-24 PROCEDURE — 3077F SYST BP >= 140 MM HG: CPT | Performed by: SPECIALIST

## 2023-05-24 PROCEDURE — 99215 OFFICE O/P EST HI 40 MIN: CPT | Performed by: SPECIALIST

## 2023-05-24 PROCEDURE — 3079F DIAST BP 80-89 MM HG: CPT | Performed by: SPECIALIST

## 2023-05-24 RX ORDER — NEBIVOLOL 5 MG/1
5 TABLET ORAL DAILY
Qty: 90 TABLET | Refills: 3 | Status: SHIPPED | OUTPATIENT
Start: 2023-05-24

## 2023-05-24 RX ORDER — ASPIRIN 81 MG/1
81 TABLET ORAL DAILY
Qty: 90 TABLET | Refills: 3 | Status: SHIPPED | OUTPATIENT
Start: 2023-05-24

## 2023-05-24 RX ORDER — ROSUVASTATIN CALCIUM 5 MG/1
5 TABLET, COATED ORAL NIGHTLY
Qty: 90 TABLET | Refills: 3 | Status: SHIPPED | OUTPATIENT
Start: 2023-05-24

## 2023-05-24 NOTE — PROGRESS NOTES
PA submitted via [email or web address]    Key: [id number]  [employer] Case: [id number], Status: Approved, Coverage Starts on: 5/24/2023 12:00:00 AM, Coverage Ends on: 11/20/2023 12:00:00 AM.\"

## 2023-05-24 NOTE — PROGRESS NOTES
Gayle Evans MD. [facility]          Patient: Phyllis Jim  : 1965      Today's Date: 2023        HISTORY OF PRESENT ILLNESS:     History of Present Illness:  Comes in with CP, fatigue    Gets dizzy when he stands up sometimes. More tired past week. A little more SOB. CP feels like a burp that won't come out - random. Heart pounds walking, but no CP walking. -160's/80's at home. He stopped his meds for a while, but he has resumed meds now - past couple of weeks. He has been non-compliant with Repatha and ASA>           PAST MEDICAL HISTORY:     Past Medical History:   Diagnosis Date    Arthritis     ankles, feet, hands    CAD (coronary artery disease)     s/p cath on 14 with 80% prox/mid LAD lesion;   --> PCI to RCA (2 EVI)    Cancer (HCC)     tonsil cancer    Dyslipidemia     GERD (gastroesophageal reflux disease)     Hypertension     Hypothyroidism     Obesity (BMI 30-39. 9)     Psychiatric disorder     depression    Seizures ([address]        Past Surgical History:   Procedure Laterality Date    HEENT      tonsillectomy for malignant tumor    OTHER SURGICAL HISTORY      tonsil cancer (removed)    [place] UNLISTED PROCEDURE CARDIAC SURGERY  2015    stent             CURRENT MEDICATIONS:    .  Current Outpatient Medications   Medication Sig Dispense Refill    amLODIPine (NORVASC) 5 MG tablet Take 1 tablet by mouth daily      levothyroxine (SYNTHROID) 200 MCG tablet Take 1 tablet by mouth every morning (before breakfast)      losartan (COZAAR) 50 MG tablet Take 1 tablet by mouth daily      nitroGLYCERIN (NITROSTAT) 0.4 MG SL tablet Place 1 tablet under the tongue      aspirin 81 MG EC tablet Take 81 mg by mouth daily (Patient not taking: Reported on 2023)      Evolocumab 140 MG/ML SOAJ Inject 140 mg into the skin every 14 days (Patient not taking: Reported on 2023)      ondansetron (ZOFRAN-ODT) 4 MG disintegrating tablet Take 4 mg by mouth every 8 hours as needed (Patient

## 2023-05-24 NOTE — PATIENT INSTRUCTIONS
instead of chips and dip. Sprinkle sunflower seeds or chopped almonds over salads. Or try adding chopped walnuts or almonds to cooked vegetables. Try some vegetarian meals using beans and peas. Add garbanzo or kidney beans to salads. Make burritos and tacos with mashed cruz beans or black beans. Where can you learn more? Go to http://www.woods.com/ and enter H967 to learn more about \"DASH Diet: Care Instructions. \"  Current as of: May 9, 2022               Content Version: 13.6  © 3837-3275 Healthwise, [employer]. Care instructions adapted under license by [facility] ([facility]). If you have questions about a medical condition or this instruction, always ask your healthcare professional. [address] any warranty or liability for your use of this information.

## 2023-05-24 NOTE — PROGRESS NOTES
Chief Complaint   Patient presents with    Chest Pain    Fatigue    Dizziness     Vitals:    05/24/23 1341 05/24/23 1350   BP: (!) 170/88 (!) 170/88   Site: Left Upper Arm Left Upper Arm   Position: Sitting Sitting   Cuff Size: Medium Adult Medium Adult   Pulse: 68    SpO2: 96%    Weight: 267 lb (121.1 kg)    Height: 5' 10\" (1.778 m)       BP (!) 170/88 (Site: Left Upper Arm, Position: Sitting, Cuff Size: Medium Adult)   Pulse 68   Ht 5' 10\" (1.778 m)   Wt 267 lb (121.1 kg)   SpO2 96%   BMI 38.31 kg/m²      Chest pain             NO  SOB                       HAS TO RANDOMLY            CATCH BREATH,   Swelling                 NO  Dizziness               YES, WHEN RISING TO STAND  Recent hospital     NO  Refills                    NITROGLYCERIN  Covid vaccination  YES  Covid                     YES

## 2023-05-24 NOTE — PROGRESS NOTES
Orders for Exercise cardiolite (2 day study) and echo when possible (CP, SOB)     Refer for a sleep study     See me in 4 weeks  per Dr. Yamile HAIRSTON.

## 2023-05-25 LAB
ALBUMIN SERPL-MCNC: 4.7 G/DL (ref 3.8–4.9)
ALP SERPL-CCNC: 46 IU/L (ref 44–121)
ALT SERPL-CCNC: 50 IU/L (ref 0–44)
AST SERPL-CCNC: 26 IU/L (ref 0–40)
BILIRUB DIRECT SERPL-MCNC: <0.1 MG/DL (ref 0–0.4)
BILIRUB SERPL-MCNC: 0.3 MG/DL (ref 0–1.2)
CHOLEST SERPL-MCNC: 261 MG/DL (ref 100–199)
HBA1C MFR BLD: 6.4 % (ref 4.8–5.6)
HDL SERPL-SCNC: 24 UMOL/L
HDLC SERPL-MCNC: 32 MG/DL
LDL SERPL QN: 19.9 NM
LDL SERPL-SCNC: 2502 NMOL/L
LDL SMALL SERPL-SCNC: 1778 NMOL/L
LDLC SERPL CALC-MCNC: 170 MG/DL (ref 0–99)
PROT SERPL-MCNC: 6.9 G/DL (ref 6–8.5)
TRIGL SERPL-MCNC: 305 MG/DL (ref 0–149)
TSH SERPL DL<=0.005 MIU/L-ACNC: 8.45 UIU/ML (ref 0.45–4.5)

## 2023-05-26 LAB — APO A-I SERPL-MCNC: 115 MG/DL (ref 101–178)

## 2023-06-02 ENCOUNTER — CLINICAL DOCUMENTATION (OUTPATIENT)
Age: 58
End: 2023-06-02

## 2023-08-27 ASSESSMENT — SLEEP AND FATIGUE QUESTIONNAIRES
ESS TOTAL SCORE: 9
HOW LIKELY ARE YOU TO NOD OFF OR FALL ASLEEP WHILE SITTING AND TALKING TO SOMEONE: WOULD NEVER DOZE
HAS YOUR RELATIONSHIP WITH FAMILY, FRIENDS OR WORK COLLEAGUES BEEN AFFECTED BECAUSE YOU ARE SLEEPY OR TIRED: NO
HOW LIKELY ARE YOU TO NOD OFF OR FALL ASLEEP WHILE LYING DOWN TO REST IN THE AFTERNOON WHEN CIRCUMSTANCES PERMIT: 2
FOSQ SCORE: 17
DO YOU HAVE DIFFICULTY CONCENTRATING ON THE THINGS YOU DO BECAUSE YOU ARE SLEEPY OR TIRED: NO
HAS YOUR MOOD BEEN AFFECTED BECAUSE YOU ARE SLEEPY OR TIRED: YES, LITTLE
HOW LIKELY ARE YOU TO NOD OFF OR FALL ASLEEP WHILE SITTING AND READING: HIGH CHANCE OF DOZING
DO YOU HAVE DIFFICULTY BEING AS ACTIVE AS YOU WANT TO BE IN THE EVENING BECAUSE YOU ARE SLEEPY OR TIRED: YES, LITTLE
HOW LIKELY ARE YOU TO NOD OFF OR FALL ASLEEP WHILE SITTING INACTIVE IN A PUBLIC PLACE: 1
DO YOU HAVE DIFFICULTY WATCHING A MOVIE OR VIDEO BECAUSE YOU BECOME SLEEPY OR TIRED: YES, MODERATE
HOW LIKELY ARE YOU TO NOD OFF OR FALL ASLEEP WHEN YOU ARE A PASSENGER IN A CAR FOR AN HOUR WITHOUT A BREAK: SLIGHT CHANCE OF DOZING
DO YOU HAVE DIFFICULTY VISITING YOUR FAMILY OR FRIENDS IN THEIR HOME BECAUSE YOU BECOME SLEEPY OR TIRED: NO
HOW LIKELY ARE YOU TO NOD OFF OR FALL ASLEEP WHEN YOU ARE A PASSENGER IN A CAR FOR AN HOUR WITHOUT A BREAK: 1
HOW LIKELY ARE YOU TO NOD OFF OR FALL ASLEEP WHILE SITTING INACTIVE IN A PUBLIC PLACE: SLIGHT CHANCE OF DOZING
HOW LIKELY ARE YOU TO NOD OFF OR FALL ASLEEP WHILE LYING DOWN TO REST IN THE AFTERNOON WHEN CIRCUMSTANCES PERMIT: MODERATE CHANCE OF DOZING
HOW LIKELY ARE YOU TO NOD OFF OR FALL ASLEEP WHILE WATCHING TV: SLIGHT CHANCE OF DOZING
DO YOU GENERALLY HAVE DIFFICULTY REMEMBERING THINGS BECAUSE YOU ARE SLEEPY OR TIRED: NO
HOW LIKELY ARE YOU TO NOD OFF OR FALL ASLEEP IN A CAR, WHILE STOPPED FOR A FEW MINUTES IN TRAFFIC: WOULD NEVER DOZE
DO YOU HAVE DIFFICULTY OPERATING A MOTOR VEHICLE FOR LONG DISTANCES (GREATER THAN 100 MILES) BECAUSE YOU BECOME SLEEPY: YES, A LITTLE
HOW LIKELY ARE YOU TO NOD OFF OR FALL ASLEEP WHILE SITTING QUIETLY AFTER LUNCH WITHOUT ALCOHOL: SLIGHT CHANCE OF DOZING
DO YOU HAVE DIFFICULTY OPERATING A MOTOR VEHICLE FOR SHORT DISTANCES (LESS THAN 100 MILES) BECAUSE YOU BECOME SLEEPY: NO
HOW LIKELY ARE YOU TO NOD OFF OR FALL ASLEEP IN A CAR, WHILE STOPPED FOR A FEW MINUTES IN TRAFFIC: 0
HOW LIKELY ARE YOU TO NOD OFF OR FALL ASLEEP WHILE SITTING AND READING: 3
HOW LIKELY ARE YOU TO NOD OFF OR FALL ASLEEP WHILE SITTING QUIETLY AFTER LUNCH WITHOUT ALCOHOL: 1
HOW LIKELY ARE YOU TO NOD OFF OR FALL ASLEEP WHILE WATCHING TV: 1
HOW LIKELY ARE YOU TO NOD OFF OR FALL ASLEEP WHILE SITTING AND TALKING TO SOMEONE: 0
DO YOU HAVE DIFFICULTY BEING AS ACTIVE AS YOU WANT TO BE IN THE MORNING BECAUSE YOU ARE SLEEPY OR TIRED: YES, LITTLE

## 2023-08-29 ENCOUNTER — OFFICE VISIT (OUTPATIENT)
Age: 58
End: 2023-08-29
Payer: COMMERCIAL

## 2023-08-29 VITALS
WEIGHT: 273.8 LBS | BODY MASS INDEX: 39.2 KG/M2 | DIASTOLIC BLOOD PRESSURE: 83 MMHG | SYSTOLIC BLOOD PRESSURE: 148 MMHG | HEART RATE: 78 BPM | HEIGHT: 70 IN | OXYGEN SATURATION: 97 %

## 2023-08-29 DIAGNOSIS — G47.33 OSA (OBSTRUCTIVE SLEEP APNEA): Primary | ICD-10-CM

## 2023-08-29 PROCEDURE — 99213 OFFICE O/P EST LOW 20 MIN: CPT | Performed by: SPECIALIST

## 2023-08-29 PROCEDURE — 3077F SYST BP >= 140 MM HG: CPT | Performed by: SPECIALIST

## 2023-08-29 PROCEDURE — 3079F DIAST BP 80-89 MM HG: CPT | Performed by: SPECIALIST

## 2023-08-29 NOTE — PROGRESS NOTES
[place] [id number]  [facility]  [address] [place] [address]  Dept: [phone] [address] [place], [address]  Tel.  [phone]  Fax. [address] [facility], [address] [facility]  Tel.  [phone]  Fax. [phone] [facility], [address] [facility]  Tel.  [phone]  Fax. [phone]         Chief Complaint       Chief Complaint   Patient presents with    Sleep Problem     Need to be re-evaluated for sleep apnea         HPI        Jessica Patient is a 62 y.o. male seen for follow-up. He was previously  evaluated with a sleep study which demonstrated AHI of 4/h associated with minimal SaO2 88%. Snoring during 0.9% of the study. Patient reported [place] Sleepiness Scale of 14. HSAT potentially underestimated sleep apnea severity. Recommendation: PSG. Patient did not schedule that evaluation. Allergies   Allergen Reactions    Rosuvastatin Myalgia       No current facility-administered medications for this visit. He  has a past medical history of Arthritis, CAD (coronary artery disease), Cancer ([address]), Dyslipidemia, GERD (gastroesophageal reflux disease), Hypertension, Hypothyroidism, Obesity (BMI 30-39.9), Psychiatric disorder, and Seizures ([address]). He  has a past surgical history that includes pr unlisted procedure cardiac surgery (2015); other surgical history; and heent. He family history includes Heart Attack in his father; Heart Disease in his father; Hypertension in his father; Thyroid Disease in his mother. He  reports that he quit smoking about 6 years ago. His smoking use included cigarettes. He smoked an average of 2 packs per day. He quit smokeless tobacco use about 43 years ago. He reports current alcohol use of about 2.0 - 3.0 standard drinks per week. He reports that he does not use drugs.      Review of Systems:  Unchanged per

## 2023-10-04 ENCOUNTER — PROCEDURE VISIT (OUTPATIENT)
Age: 58
End: 2023-10-04

## 2023-10-04 DIAGNOSIS — G47.33 OSA (OBSTRUCTIVE SLEEP APNEA): Primary | ICD-10-CM

## 2023-10-04 NOTE — PROGRESS NOTES
[address] [facility] [address], [address]  Tel.  [phone]  Fax. [address] [facility], [address] [place] [address]  Tel.  [phone]  Fax. [phone] [facility], [address] [place] [address]  Tel.  [phone]  Fax. [phone]       S>German Pabon is a 62 y.o. male seen today to receive a home sleep testing unit (HST). Patient was educated on proper hookup and operation of the HST. Instruction forms and documentation were reviewed and signed. The patient demonstrated good understanding of the HST.    O>    There were no vitals taken for this visit. A>  No diagnosis found. P>  General information regarding operations and maintenance of the device was provided. He was provided information on sleep apnea including coresponding risk factors and the importance of proper treatment. Follow-up appointment was made to return the HST. He will be contacted once the results have been reviewed. He was asked to contact our office for any problems regarding his home sleep test study.

## 2023-10-20 ENCOUNTER — TELEPHONE (OUTPATIENT)
Age: 58
End: 2023-10-20

## 2023-10-20 NOTE — TELEPHONE ENCOUNTER
WatchPAT HSAT performed for potential sleep disordered breathing. 8 hours 43 minutes recorded in which 8 hours 10 minutes spent asleep; 31.2% of sleep and REM. All sleep stages observed. Patient slept supine, lateral and prone. Overall AHI 5.6/h (4% desaturation definition). Minimal SaO2 briefly 84%; in REM sleep and supine at that time. Mean of desaturation shashi: 91%. Impression: Borderline sleep disordered breathing. Events primarily in REM sleep and when supine. Positional measures to limit supine sleep, weight reduction would be indicated. Sleep technologist: Please review study results with the patient.

## 2023-12-13 ENCOUNTER — TELEPHONE (OUTPATIENT)
Age: 58
End: 2023-12-13

## 2023-12-13 NOTE — TELEPHONE ENCOUNTER
Patient called requesting information when his Stent was placed for documentation patient is having MRI done.  Patient believes Stent may have been 2018 would like information faxed directly to him@    Fax# [phone]

## 2024-01-09 ENCOUNTER — TELEPHONE (OUTPATIENT)
Age: 59
End: 2024-01-09

## 2024-01-09 NOTE — TELEPHONE ENCOUNTER
----- Message from Iliana Byers LPN sent at 1/8/2024  2:51 PM EST -----  Regarding: FW: Authorization  Contact: [phone]    ----- Message -----  From: German Pabon  Sent: 1/8/2024   2:49 PM EST  To: Todd Dimas Clinical Staff  Subject: Authorization                                    That's correct. I stopped the statin because of muscle aches and Dr Hernandez placed me on the Repatha instead. Thank you.

## 2024-01-09 NOTE — TELEPHONE ENCOUNTER
Attached are the patient's recent labs. He is not taken a statin d/t muscle aches. This is in regards to authorization for his repatha.

## 2024-01-09 NOTE — TELEPHONE ENCOUNTER
----- Message from Iliana Byers LPN sent at 1/8/2024  2:51 PM EST -----  Regarding: FW: Authorization  Contact: [phone]    ----- Message -----  From: German Pabon  Sent: 1/8/2024   2:49 PM EST  To: Todd Dimas Clinical Staff  Subject: Authorization                                    That's correct. I stopped the statin because of muscle aches and Dr Hernandez placed me on the Repatha instead. Thank you.

## 2024-01-12 ENCOUNTER — CLINICAL DOCUMENTATION (OUTPATIENT)
Age: 59
End: 2024-01-12

## 2024-01-12 NOTE — PROGRESS NOTES
PA submitted via [email or web address]    Key: [id number]  Med: Repatha    Included recent cholesterol panel drawn through pt's work, Pt 1st:    1/3/2024 Lipid Panel Labs   Chol Tot 139   Trig  281   HDL  35   VLDL chol 44   LDL-c  60

## 2024-03-01 NOTE — TELEPHONE ENCOUNTER
Refill per VO of Dr. Hernandez  Last appt: 5/24/2023    No future appointments.    Requested Prescriptions     Signed Prescriptions Disp Refills    Evolocumab 140 MG/ML SOAJ 7 Adjustable Dose Pre-filled Pen Syringe 3     Sig: Inject 140 mg into the skin every 14 days     Authorizing Provider: AZRA HERNANDEZ     Ordering User: FERN YOUNG

## 2024-11-29 NOTE — DISCHARGE SUMMARY
Discharge Summary     Patient: Eugenie Gaines MRN: [id number]  SSN: [id number]    YOB: 1965  Age: 46 y.o. Sex: male       Admit Date: 4/10/2017    Discharge Date: 4/11/17     Admission Diagnoses: MALIGNANT NEOPLASM OF TONSIL  Malignant neoplasm of tonsil [facility])    Discharge Diagnoses:   Problem List as of 4/11/2017  Date Reviewed: 4/10/2017          Codes Class Noted - Resolved    Malignant neoplasm of tonsil ([facility] ICD-10-CM: C09.9  ICD-9-CM: 146.0  4/10/2017 - Present        Unstable angina ([facility] ICD-10-CM: I20.0  ICD-9-CM: 411.1  9/29/2014 - Present        Family history of early CAD ICD-10-CM: Z82.49  ICD-9-CM: V17.3  9/29/2014 - Present        History of tobacco use ICD-10-CM: Z87.891  ICD-9-CM: V15.82  9/29/2014 - Present        HTN (hypertension) ICD-10-CM: I10  ICD-9-CM: 401.9  9/29/2014 - Present          . Procedures Performed: RIGHT MODIFIED NECK DISSECTION (Right Neck)     Discharge Condition: Good    Hospital Course: He underwent the above listed procedure. He was admitted overnight. After some initial nausea/vomitting he did not have any issues and was discharged home the following morning. Consults: None    Disposition: home    Discharge Medications:   Current Discharge Medication List      START taking these medications    Details   ondansetron (ZOFRAN ODT) 4 mg disintegrating tablet Take 1 Tab by mouth every eight (8) hours as needed for Nausea. Qty: 8 Tab, Refills: 1      oxyCODONE-acetaminophen (PERCOCET 7.5) 7.5-325 mg per tablet Take 1 Tab by mouth every four (4) hours as needed for Pain. Max Daily Amount: 6 Tabs. Qty: 50 Tab, Refills: 0         CONTINUE these medications which have NOT CHANGED    Details   levothyroxine (SYNTHROID) 100 mcg tablet Take 1 tablet by mouth Daily (before breakfast).   Qty: 30 tablet, Refills: 2         STOP taking these medications       aspirin delayed-release 81 mg tablet Comments:   Reason for Stopping:               Activity: No heavy lifting for 4 weeks  Diet: Regular Diet  Wound Care: Keep wound clean and dry    No orders of the defined types were placed in this encounter.       Signed By: Barry Reyes MD     April 11, 2017 29-Nov-2024 15:14

## 2025-02-06 ENCOUNTER — HOSPITAL ENCOUNTER (EMERGENCY)
Facility: HOSPITAL | Age: 60
Discharge: HOME | End: 2025-02-06
Attending: EMERGENCY MEDICINE
Payer: COMMERCIAL

## 2025-02-06 VITALS
RESPIRATION RATE: 18 BRPM | OXYGEN SATURATION: 100 % | DIASTOLIC BLOOD PRESSURE: 93 MMHG | HEART RATE: 89 BPM | WEIGHT: 278 LBS | TEMPERATURE: 97.3 F | BODY MASS INDEX: 39.89 KG/M2 | SYSTOLIC BLOOD PRESSURE: 173 MMHG

## 2025-02-06 DIAGNOSIS — I10 ESSENTIAL HYPERTENSION: Primary | ICD-10-CM

## 2025-02-06 DIAGNOSIS — I10 HYPERTENSION, UNSPECIFIED TYPE: ICD-10-CM

## 2025-02-06 LAB
ANION GAP BLD CALC-SCNC: 13 (ref 10–20)
ANION GAP BLD CALC-SCNC: 8 (ref 10–20)
BASE DEFICIT BLD-SCNC: 0.2 MMOL/L
BASE EXCESS BLD CALC-SCNC: 1.8 MMOL/L
CA-I BLD-MCNC: 1.21 MMOL/L (ref 1.15–1.33)
CA-I BLD-MCNC: 1.23 MMOL/L (ref 1.15–1.33)
CHLORIDE BLD-SCNC: 101 MMOL/L (ref 100–111)
CHLORIDE BLD-SCNC: 106 MMOL/L (ref 100–111)
CO2 BLD-SCNC: 24 MMOL/L (ref 22–29)
CO2 BLD-SCNC: 25 MMOL/L (ref 22–29)
CREAT UR-MCNC: 0.9 MG/DL (ref 0.6–1.3)
CREAT UR-MCNC: 1 MG/DL (ref 0.6–1.3)
GLUCOSE BLD STRIP.AUTO-MCNC: 236 MG/DL (ref 74–99)
GLUCOSE BLD STRIP.AUTO-MCNC: 246 MG/DL (ref 74–99)
HCO3 BLDA-SCNC: 25 MMOL/L
HCO3 BLDA-SCNC: 26 MMOL/L
LACTATE BLD-SCNC: 3.02 MMOL/L (ref 0.4–2)
LACTATE BLD-SCNC: 3.08 MMOL/L (ref 0.4–2)
PCO2 BLDV: 38.2 MMHG (ref 41–51)
PCO2 BLDV: 40.8 MMHG (ref 41–51)
PH BLDV: 7.39 (ref 7.32–7.42)
PH BLDV: 7.44 (ref 7.32–7.42)
PO2 BLDV: 55 MMHG (ref 25–40)
PO2 BLDV: 59 MMHG (ref 25–40)
POTASSIUM BLD-SCNC: 4.1 MMOL/L (ref 3.5–5.5)
POTASSIUM BLD-SCNC: 4.2 MMOL/L (ref 3.5–5.5)
SAO2 % BLD: 88 % (ref 94–98)
SAO2 % BLD: 91 % (ref 94–98)
SODIUM BLD-SCNC: 138 MMOL/L (ref 136–145)
SODIUM BLD-SCNC: 139 MMOL/L (ref 136–145)
SPECIMEN SITE: ABNORMAL
SPECIMEN SITE: ABNORMAL

## 2025-02-06 PROCEDURE — 2580000003 HC RX 258: Performed by: NURSE PRACTITIONER

## 2025-02-06 PROCEDURE — 82947 ASSAY GLUCOSE BLOOD QUANT: CPT

## 2025-02-06 PROCEDURE — 84132 ASSAY OF SERUM POTASSIUM: CPT

## 2025-02-06 PROCEDURE — 99284 EMERGENCY DEPT VISIT MOD MDM: CPT

## 2025-02-06 PROCEDURE — 84295 ASSAY OF SERUM SODIUM: CPT

## 2025-02-06 PROCEDURE — 96360 HYDRATION IV INFUSION INIT: CPT

## 2025-02-06 PROCEDURE — 82803 BLOOD GASES ANY COMBINATION: CPT

## 2025-02-06 PROCEDURE — 82330 ASSAY OF CALCIUM: CPT

## 2025-02-06 RX ORDER — 0.9 % SODIUM CHLORIDE 0.9 %
1000 INTRAVENOUS SOLUTION INTRAVENOUS ONCE
Status: COMPLETED | OUTPATIENT
Start: 2025-02-06 | End: 2025-02-06

## 2025-02-06 RX ADMIN — SODIUM CHLORIDE 1000 ML: 9 INJECTION, SOLUTION INTRAVENOUS at 09:12

## 2025-02-06 ASSESSMENT — ENCOUNTER SYMPTOMS
TROUBLE SWALLOWING: 0
SHORTNESS OF BREATH: 0
ABDOMINAL DISTENTION: 0
SINUS PAIN: 0
SINUS PRESSURE: 0
NAUSEA: 0
ABDOMINAL PAIN: 0
COLOR CHANGE: 0
COUGH: 0
VOMITING: 0
EYE REDNESS: 0
EYE PAIN: 0
BACK PAIN: 1

## 2025-02-06 ASSESSMENT — PAIN SCALES - GENERAL: PAINLEVEL_OUTOF10: 8

## 2025-02-06 ASSESSMENT — PAIN DESCRIPTION - ORIENTATION: ORIENTATION: LEFT;LOWER

## 2025-02-06 ASSESSMENT — PAIN - FUNCTIONAL ASSESSMENT: PAIN_FUNCTIONAL_ASSESSMENT: 0-10

## 2025-02-06 ASSESSMENT — PAIN DESCRIPTION - LOCATION: LOCATION: BACK

## 2025-02-06 ASSESSMENT — PAIN DESCRIPTION - DESCRIPTORS: DESCRIPTORS: DULL;ACHING

## 2025-02-06 NOTE — ED TRIAGE NOTES
PT sts he has hx of HTN and takes Amlodipine and Losartan and was at work and had his BP taken manually and it was 234/106    In triage the pt BP is  196/90    Pt sts he has an injury to his back and was lifting weights for PT yesterday and kinked it and is wondering if this is why his BP is elevated because of the pain

## 2025-02-06 NOTE — ED NOTES
Discharge instructions were reviewed and given to the PATIENT. Patient given a current medication reconciliation form and verbalized understanding of their medications, side affects, medication administration, and time when due. Importance of follow-up and appointment times and dates reviewed. The patient verbalized understanding of discharge instructions and prescriptions, all questions were answered. Patient has no further concerns at this time. Patient stable at time of discharge.

## 2025-02-06 NOTE — ED PROVIDER NOTES
[facility] EMERGENCY DEPARTMENT  EMERGENCY DEPARTMENT ENCOUNTER      Pt Name: German Pabon  MRN: [id number]  Birthdate 1965  Date of evaluation: 2/6/2025  Provider: MARILYN Chester NP      HISTORY OF PRESENT ILLNESS      Chief Complaint (CC):  High blood pressure, back pain, hip pain.    Past Medical History:  No date: Arthritis      Comment:  ankles, feet, hands  No date: CAD (coronary artery disease)      Comment:  s/p cath on 9/30/14 with 80% prox/mid LAD lesion;  12/18               --> PCI to RCA (2 EVI)  No date: Cancer (HCC)      Comment:  tonsil cancer  No date: Dyslipidemia  No date: GERD (gastroesophageal reflux disease)  No date: Hypertension  No date: Hypothyroidism  No date: Obesity (BMI 30-39.9)  No date: Psychiatric disorder      Comment:  depression  1983: Seizures (HCC)  Past Surgical History:  No date: HEENT      Comment:  tonsillectomy for malignant tumor  No date: OTHER SURGICAL HISTORY      Comment:  tonsil cancer (removed)  2015: [place] UNLISTED PROCEDURE CARDIAC SURGERY      Comment:  stent        The history is provided by the patient.           Nursing Notes were reviewed.    REVIEW OF SYSTEMS         Review of Systems   Constitutional:  Negative for activity change, chills, diaphoresis, fatigue and fever.   HENT:  Negative for congestion, sinus pressure, sinus pain and trouble swallowing.    Eyes:  Negative for pain, redness and visual disturbance.   Respiratory:  Negative for cough and shortness of breath.    Cardiovascular:  Negative for chest pain and palpitations.   Gastrointestinal:  Negative for abdominal distention, abdominal pain, nausea and vomiting.   Genitourinary:  Negative for difficulty urinating, frequency and urgency.   Musculoskeletal:  Positive for back pain. Negative for arthralgias, gait problem, neck pain and neck stiffness.   Skin:  Negative for color change, pallor, rash and wound.   Neurological:  Negative for dizziness, speech  Comments: Negative, no loss of sensation for bowel or bladder, no bowel or bladder incontinence.    Musculoskeletal:         General: Tenderness (lumbar back pain.) present. No swelling. Normal range of motion.      Cervical back: Normal range of motion and neck supple. No tenderness.   Skin:     General: Skin is warm and dry.      Capillary Refill: Capillary refill takes less than 2 seconds.   Neurological:      General: No focal deficit present.      Mental Status: He is alert and oriented to person, place, and time.      Sensory: No sensory deficit.      Motor: No weakness.   Psychiatric:         Mood and Affect: Mood normal.         Behavior: Behavior normal.         Thought Content: Thought content normal.         Judgment: Judgment normal.             EMERGENCY DEPARTMENT COURSE and DIFFERENTIAL DIAGNOSIS/MDM:   Vitals:    Vitals:    02/06/25 0829   BP: (!) 196/90   Pulse: 89   Resp: 18   Temp: 97.3 °F (36.3 °C)   TempSrc: Oral   SpO2: 100%   Weight: 126.1 kg (278 lb)         Medical Decision Making  Differential Diagnosis (DD):  Essential Hypertension, Obesity-related Hypertension, Acute Musculoskeletal Back Strain, Hip Osteoarthritis, and others.      History of Present Illness (HPI):  The patient is a 59-year-old male found to have elevated blood pressure during a routine check at home, and subsequent readings were similarly high. He had his blood pressure manually checked today by a colleague, which confirmed the elevated readings. He denies chest pain, shortness of breath, dizziness, nausea, vomiting, fever, or chills. He has a history of back and hip pain, recently exacerbated by a strenuous workout. He reports \"tweaking\" his back yesterday and has been taking ibuprofen with partial relief of pain. He has been attending physical therapy for these issues but denies recent trauma or falls. Is on Losaartan as well as amlodipine for HTN that he takes daily.     After physical examination, labs obtained.

## 2025-03-05 ENCOUNTER — TELEPHONE (OUTPATIENT)
Age: 60
End: 2025-03-05

## 2025-03-05 NOTE — TELEPHONE ENCOUNTER
PA submitted via [email or web address]    Key: [id number]  Med: Repatha    LV 5/24/2023  NV needed x4 weeks    7/14/2023 \"rescheduled\"    Dx: CAD I 25.118

## (undated) DEVICE — INTENDED FOR TISSUE SEPARATION, AND OTHER PROCEDURES THAT REQUIRE A SHARP SURGICAL BLADE TO PUNCTURE OR CUT.: Brand: BARD-PARKER ® CARBON RIB-BACK BLADES

## (undated) DEVICE — TRAY PREP DRY W/ PREM GLV 2 APPL 6 SPNG 2 UNDPD 1 OVERWRAP

## (undated) DEVICE — SUTURE VCRL SZ 3-0 L18IN ABSRB UD PS-2 L19MM 1/2 CIR J497G

## (undated) DEVICE — SLIM BODY SKIN STAPLER: Brand: APPOSE ULC

## (undated) DEVICE — DRAIN SURG W10MMXL20CM SIL FULL PERF HUBLESS FLAT RADPQ

## (undated) DEVICE — KENDALL SCD EXPRESS SLEEVES, KNEE LENGTH, MEDIUM: Brand: KENDALL SCD

## (undated) DEVICE — MAGNETIC DRAPE: Brand: DEVON

## (undated) DEVICE — SUTURE PERMAHAND SZ 2-0 L18IN NONABSORBABLE BLK L26MM PS 1588H

## (undated) DEVICE — SUT ETHLN 3-0 18IN PS1 BLK --

## (undated) DEVICE — SOLUTION IV 1000ML 0.9% SOD CHL

## (undated) DEVICE — SUTURE VCRL SZ 3-0 L18IN ABSRB UD L26MM SH 1/2 CIR J864D

## (undated) DEVICE — DEVON™ KNEE AND BODY STRAP 60" X 3" (1.5 M X 7.6 CM): Brand: DEVON

## (undated) DEVICE — INFECTION CONTROL KIT SYS

## (undated) DEVICE — APPLIER CLP AUTO MED 9.75 IN TI SURGCLP SUPER INTLOK 20 DISP

## (undated) DEVICE — SYR 10ML CTRL LR LCK NSAF LF --

## (undated) DEVICE — SUTURE MCRYL SZ 4-0 L27IN ABSRB UD L19MM PS-2 1/2 CIR PRIM Y426H

## (undated) DEVICE — PACK,EENT,TURBAN DRAPE,PK II: Brand: MEDLINE

## (undated) DEVICE — ROCKER SWITCH PENCIL BLADE ELECTRODE, HOLSTER: Brand: EDGE

## (undated) DEVICE — X-RAY SPONGES,16 PLY: Brand: DERMACEA

## (undated) DEVICE — TOWEL SURG W17XL27IN STD BLU COT NONFENESTRATED PREWASHED

## (undated) DEVICE — REM POLYHESIVE ADULT PATIENT RETURN ELECTRODE: Brand: VALLEYLAB

## (undated) DEVICE — BIPOLAR FORCEPS CORD,BANANA LEADS: Brand: VALLEYLAB

## (undated) DEVICE — SUTURE PERMAHAND SZ 3-0 L30IN NONABSORBABLE BLK SILK BRAID A304H

## (undated) DEVICE — GOWN,SIRUS,NONRNF,SETINSLV,2XL,18/CS: Brand: MEDLINE

## (undated) DEVICE — 40418 TRENDELENBURG ONE-STEP ARM PROTECTORS LARGE (1 PAIR): Brand: 40418 TRENDELENBURG ONE-STEP ARM PROTECTORS LARGE (1 PAIR)

## (undated) DEVICE — SURGICAL PROCEDURE PACK BASIN MAJ SET CUST NO CAUT

## (undated) DEVICE — AGENT HEMSTAT 3GM PURIFIED PLNT STARCH PWD ABSRB ARISTA AH

## (undated) DEVICE — NEEDLE HYPO 25GA L1.5IN BVL ORIENTED ECLIPSE

## (undated) DEVICE — APPLIER CLP LIG SM TI PREM SURGCLP SUPER INTLOK 20 DISP